# Patient Record
Sex: FEMALE | Race: BLACK OR AFRICAN AMERICAN | NOT HISPANIC OR LATINO | Employment: OTHER | URBAN - METROPOLITAN AREA
[De-identification: names, ages, dates, MRNs, and addresses within clinical notes are randomized per-mention and may not be internally consistent; named-entity substitution may affect disease eponyms.]

---

## 2017-01-17 ENCOUNTER — OFFICE VISIT (OUTPATIENT)
Dept: ORTHOPEDICS | Facility: CLINIC | Age: 72
End: 2017-01-17
Payer: MEDICARE

## 2017-01-17 VITALS
DIASTOLIC BLOOD PRESSURE: 66 MMHG | WEIGHT: 257.5 LBS | HEART RATE: 78 BPM | HEIGHT: 60 IN | BODY MASS INDEX: 50.55 KG/M2 | SYSTOLIC BLOOD PRESSURE: 102 MMHG

## 2017-01-17 DIAGNOSIS — M25.811 SHOULDER IMPINGEMENT, RIGHT: Primary | ICD-10-CM

## 2017-01-17 PROCEDURE — 99999 PR PBB SHADOW E&M-EST. PATIENT-LVL III: CPT | Mod: PBBFAC,,, | Performed by: ORTHOPAEDIC SURGERY

## 2017-01-17 PROCEDURE — 3074F SYST BP LT 130 MM HG: CPT | Mod: S$GLB,,, | Performed by: ORTHOPAEDIC SURGERY

## 2017-01-17 PROCEDURE — 1125F AMNT PAIN NOTED PAIN PRSNT: CPT | Mod: S$GLB,,, | Performed by: ORTHOPAEDIC SURGERY

## 2017-01-17 PROCEDURE — 3078F DIAST BP <80 MM HG: CPT | Mod: S$GLB,,, | Performed by: ORTHOPAEDIC SURGERY

## 2017-01-17 PROCEDURE — 20610 DRAIN/INJ JOINT/BURSA W/O US: CPT | Mod: RT,S$GLB,, | Performed by: ORTHOPAEDIC SURGERY

## 2017-01-17 PROCEDURE — 99214 OFFICE O/P EST MOD 30 MIN: CPT | Mod: 25,S$GLB,, | Performed by: ORTHOPAEDIC SURGERY

## 2017-01-17 PROCEDURE — 1157F ADVNC CARE PLAN IN RCRD: CPT | Mod: S$GLB,,, | Performed by: ORTHOPAEDIC SURGERY

## 2017-01-17 PROCEDURE — 1160F RVW MEDS BY RX/DR IN RCRD: CPT | Mod: S$GLB,,, | Performed by: ORTHOPAEDIC SURGERY

## 2017-01-17 PROCEDURE — 1159F MED LIST DOCD IN RCRD: CPT | Mod: S$GLB,,, | Performed by: ORTHOPAEDIC SURGERY

## 2017-01-17 RX ORDER — AZELASTINE HYDROCHLORIDE 0.5 MG/ML
SOLUTION/ DROPS OPHTHALMIC
COMMUNITY
Start: 2017-01-13 | End: 2018-07-26

## 2017-01-17 RX ORDER — METHYLPREDNISOLONE ACETATE 80 MG/ML
80 INJECTION, SUSPENSION INTRA-ARTICULAR; INTRALESIONAL; INTRAMUSCULAR; SOFT TISSUE
Status: COMPLETED | OUTPATIENT
Start: 2017-01-17 | End: 2017-01-17

## 2017-01-17 RX ADMIN — METHYLPREDNISOLONE ACETATE 80 MG: 80 INJECTION, SUSPENSION INTRA-ARTICULAR; INTRALESIONAL; INTRAMUSCULAR; SOFT TISSUE at 05:01

## 2017-01-17 NOTE — PROGRESS NOTES
SUBJECTIVE:  Patient is status post Right shoulder ATS  .  She complains of left shoulder pain.  She states that the pain in the left shoulder is rated a 10 out of 10 and interferes with her activities of daily living.  The patient has undergone a left shoulder arthroscope in the past.  Past Medical History   Diagnosis Date    Arthritis     Asthma     COPD (chronic obstructive pulmonary disease)     Diabetes mellitus     Encounter for blood transfusion     Hypertension     Lung disease     Sleep apnea      Past Surgical History   Procedure Laterality Date    Knee arthroplasty      Shoulder      Cyst removal      Appendectomy      Hysterectomy      Cholecystectomy      Cardiac surgery       valve replacement 12/2014    Hernia repair       left, 3/2015     Review of patient's allergies indicates:   Allergen Reactions    Ace inhibitors Anaphylaxis    Ultram [tramadol] Anaphylaxis     Current Outpatient Prescriptions on File Prior to Visit   Medication Sig Dispense Refill    albuterol (PROVENTIL HFA) 90 mcg/actuation inhaler Inhale 2 puffs into the lungs every 4 (four) hours as needed for Wheezing. 1 Inhaler 0    ALCOHOL PREP PADS PadM       amlodipine (NORVASC) 10 MG tablet Take 10 mg by mouth once daily.       ammonium lactate 12 % Crea Apply 1 Applicatorful topically.      aspirin (ECOTRIN) 81 MG EC tablet Take 81 mg by mouth once daily.      atorvastatin (LIPITOR) 40 MG tablet Take 40 mg by mouth once daily.   1    BENICAR 40 mg tablet Take 40 mg by mouth once daily.       CLEVER CHOICE BLOOD GLUC SYS Misc       doxazosin (CARDURA) 2 MG tablet Take 2 mg by mouth once daily.       EMBRACE BLOOD GLUCOSE SYSTEM Strp       furosemide (LASIX) 80 MG tablet Take 1 tablet by mouth 2 (two) times daily.      gabapentin (NEURONTIN) 300 MG capsule Take 300 mg by mouth 2 (two) times daily.       hydrocodone-acetaminophen 10-325mg (NORCO)  mg Tab Take 1 tablet by mouth every 6 (six) hours as  needed.      LANCING DEVICE WITH LANCETS Misc       lidocaine-prilocaine (EMLA) cream       loratadine (CLARITIN) 10 mg tablet Take 10 mg by mouth.      metformin (GLUCOPHAGE) 500 MG tablet Take 500 mg by mouth 2 (two) times daily with meals.      metolazone (ZAROXOLYN) 2.5 MG tablet Take 1 tablet by mouth once daily.      metoprolol tartrate (LOPRESSOR) 50 MG tablet Take 1 tablet by mouth 2 (two) times daily.      montelukast (SINGULAIR) 10 mg tablet TAKE 1 TABLET (10 MG TOTAL) BY MOUTH EVERY EVENING. 30 tablet 11    nabumetone (RELAFEN) 500 MG tablet TAKE 1 TABLET BY MOUTH EVERY DAY 30 tablet 2    potassium chloride SA (K-DUR,KLOR-CON) 20 MEQ tablet Take 1 tablet by mouth once daily.      ranitidine (ZANTAC) 150 MG tablet Take 150 mg by mouth.      terbinafine (LAMISIL) 250 mg tablet       IRON HIGH POTENCY 240 mg (27 mg iron) tablet Take 1 tablet by mouth once daily.  1    LITE TOUCH LANCETS 30 gauge Misc       omega-3 fatty acids (FISH OIL) 500 mg Cap Take 1 capsule by mouth once daily.      SYMBICORT 160-4.5 mcg/actuation HFAA INHALE 2 PUFFS INTO THE LUNGS EVERY 12 (TWELVE) HOURS. 10.2 Inhaler 11    VITAMIN D2 50,000 unit capsule Take 1 capsule by mouth once a week.  0    vitamin E 1000 UNIT capsule Take 1,000 Units by mouth once daily.      [DISCONTINUED] clopidogrel (PLAVIX) 75 mg tablet        No current facility-administered medications on file prior to visit.      Visit Vitals    /66    Pulse 78    Ht 5' (1.524 m)    Wt 116.8 kg (257 lb 8 oz)    BMI 50.29 kg/m2      ROS:  GENERAL: No fever, chills, fatigability or weight loss.  SKIN: No rashes, itching or changes in color or texture of skin.  HEAD: No headaches or recent head trauma.  EYES: Visual acuity fine. No photophobia, ocular pain or diplopia.  EARS: Denies ear pain, discharge or vertigo.  NOSE: No loss of smell, no epistaxis or postnasal drip.  MOUTH & THROAT: No hoarseness or change in voice. No excessive gum  bleeding.  NODES: Denies swollen glands.  CHEST: Denies JAIME, cyanosis, wheezing, cough and sputum production.  CARDIOVASCULAR: Denies chest pain, PND, orthopnea or reduced exercise tolerance.  ABDOMEN: Appetite fine. No weight loss. Denies diarrhea, abdominal pain, hematemesis or blood in stool.  URINARY: No flank pain, dysuria or hematuria.  PERIPHERAL VASCULAR: No claudication or cyanosis.  NEUROLOGIC: No history of seizures, paralysis, alteration of gait or coordination.  MUSCULOSKELETAL: See HPI    PE:  APPEARANCE: Well nourished, well developed, in no acute distress.   HEAD: Normocephalic, atraumatic.  EYES: PERRL. EOMI.   EARS: TM's intact. Light reflex normal. No retraction or perforation.   NOSE: Mucosa pink. Airway clear.  MOUTH & THROAT: No tonsillar enlargement. No pharyngeal erythema or exudate. No stridor.  NECK: Supple.   NODES: No cervical, axillary or inguinal lymph node enlargement.  CHEST: Lungs clear to auscultation.  CARDIOVASCULAR: Normal S1, S2. No rubs, murmurs or gallops.  ABDOMEN: Bowel sounds normal. Not distended. Soft. No tenderness or masses.  NEUROLOGIC: Cranial Nerves: II-XII grossly intact, also see MUSCULOSKELETAL  MUSCULOSKELETAL:      LeftShoulder- 2 plus radial  artery and ulnar artery pulses, light touch intact Right upper extremity.  All digits are warm. No erythema, no warmth, no drainage, No swelling, no significant tenderness.  Positive impingement.  Clinically the patient has more motion in the shoulder postoperatively.      ASSESSMENT:  The patient has no further questions during this visit.  The patient is stable and improving.    Diagnosis:      PLAN:  Follow up in 12 weeks   Continue pain medication  Continue occupational therapy      Injection:  The patient was placed in the supine position with   the right  shoulder facing me.  The shoulder was   prepped, sterily, with Alcohol and Betadine.  A  Refrigerant  And coolant was used to locally anesthetize the skin   over  the posterior aspect of the subacromial space.  A one and   a half inch, 27 gauge needle attached to   A 10 cc synringe was placed into the subacromial space.   A negative pressure was placed on the needle to   ensure the aspiration was placed into the space  And not into a blood vessel.  4 cc of a 1%  Lidocaine   was mixed with 1 cc of a 80 mg solution of Depo-Medrol injected.   The patient tolerated the shoulder injection well.  A Bandage   was placed over the injection site.

## 2017-01-17 NOTE — MR AVS SNAPSHOT
[facility]  [address] [place] [address]  Phone: [phone]  Fax: [phone]                  Saira العراقي   2017 2:45 PM   Office Visit    Description:  Female : 1945   Provider:  Richie Teixeira Sr., MD   Department:  [facility]           Reason for Visit     Right Shoulder - Pain                To Do List           Future Appointments        Provider Department Dept Phone    2017 9:40 AM Cassius Mazariegos MD '[facility] [phone]    2017 10:00 AM Richie Teixeira Sr., MD [facility] [phone]      Goals (5 Years of Data)     None      [facility]     [facility] Care [facility] -  Assistance  Registered nurses in the [facility] provide clinical advisement, health education, appointment booking, and other advisory services.  Call for this free service at [phone].             Medications           Message regarding Medications     Verify the changes and/or additions to your medication regime listed below are the same as discussed with your clinician today.  If any of these changes or additions are incorrect, please notify your healthcare provider.        STOP taking these medications     clopidogrel (PLAVIX) 75 mg tablet            Verify that the below list of medications is an accurate representation of the medications you are currently taking.  If none reported, the list may be blank. If incorrect, please contact your healthcare provider. Carry this list with you in case of emergency.           Current Medications     albuterol (PROVENTIL HFA) 90 mcg/actuation inhaler Inhale 2 puffs into the lungs every 4 (four) hours as needed for Wheezing.    ALCOHOL PREP PADS PadM     amlodipine (NORVASC) 10 MG tablet Take 10 mg by mouth once daily.     ammonium lactate 12 % Crea Apply 1 Applicatorful topically.    aspirin (ECOTRIN) 81 MG EC tablet Take 81 mg by mouth once daily.    atorvastatin (LIPITOR) 40  MG tablet Take 40 mg by mouth once daily.     azelastine (OPTIVAR) 0.05 % ophthalmic solution     BENICAR 40 mg tablet Take 40 mg by mouth once daily.     CLEVER CHOICE BLOOD GLUC SYS Misc     doxazosin (CARDURA) 2 MG tablet Take 2 mg by mouth once daily.     EMBRACE BLOOD GLUCOSE SYSTEM Strp     furosemide (LASIX) 80 MG tablet Take 1 tablet by mouth 2 (two) times daily.    gabapentin (NEURONTIN) 300 MG capsule Take 300 mg by mouth 2 (two) times daily.     hydrocodone-acetaminophen 10-325mg (NORCO)  mg Tab Take 1 tablet by mouth every 6 (six) hours as needed.    LANCING DEVICE WITH LANCETS Misc     lidocaine-prilocaine (EMLA) cream     loratadine (CLARITIN) 10 mg tablet Take 10 mg by mouth.    metformin (GLUCOPHAGE) 500 MG tablet Take 500 mg by mouth 2 (two) times daily with meals.    metolazone (ZAROXOLYN) 2.5 MG tablet Take 1 tablet by mouth once daily.    metoprolol tartrate (LOPRESSOR) 50 MG tablet Take 1 tablet by mouth 2 (two) times daily.    montelukast (SINGULAIR) 10 mg tablet TAKE 1 TABLET (10 MG TOTAL) BY MOUTH EVERY EVENING.    nabumetone (RELAFEN) 500 MG tablet TAKE 1 TABLET BY MOUTH EVERY DAY    potassium chloride SA (K-DUR,KLOR-CON) 20 MEQ tablet Take 1 tablet by mouth once daily.    ranitidine (ZANTAC) 150 MG tablet Take 150 mg by mouth.    terbinafine (LAMISIL) 250 mg tablet     IRON HIGH POTENCY 240 mg (27 mg iron) tablet Take 1 tablet by mouth once daily.    LITE TOUCH LANCETS 30 gauge Misc     omega-3 fatty acids (FISH OIL) 500 mg Cap Take 1 capsule by mouth once daily.    SYMBICORT 160-4.5 mcg/actuation HFAA INHALE 2 PUFFS INTO THE LUNGS EVERY 12 (TWELVE) HOURS.    VITAMIN D2 50,000 unit capsule Take 1 capsule by mouth once a week.    vitamin E 1000 UNIT capsule Take 1,000 Units by mouth once daily.           Clinical Reference Information           Vital Signs - Last Recorded  Most recent update: 1/17/2017  2:51 PM by Roxy Craig MA    BP Pulse Ht Wt BMI    102/66 78 5' (1.524 m)  116.8 kg (257 lb 8 oz) 50.29 kg/m2      Blood Pressure          Most Recent Value    BP  102/66      Allergies as of 1/17/2017     Ace Inhibitors    Ultram [Tramadol]      Immunizations Administered on Date of Encounter - 1/17/2017     None      [facility] Sign-Up     Activating your [facility] account is as easy as 1-2-3!     1) Visit [email or web address], select Sign Up Now, enter this activation code and your date of birth, then select Next.  [email or web address]  Expires: 3/3/2017  3:23 PM      2) Create a username and password to use when you visit [facility] in the future and select a security question in case you lose your password and select Next.    3) Enter your e-mail address and click Sign Up!    Additional Information  If you have questions, please e-mail [email or web address] or call [phone] to talk to our [facility] staff. Remember, [facility] is NOT to be used for urgent needs. For medical emergencies, dial 911.

## 2017-01-17 NOTE — PATIENT INSTRUCTIONS
Shoulder Arthroscopy  The shoulder is your bodys most flexible joint. It lets the arm move in almost any direction. But this flexibility has a price -- it makes the joint prone to injury. If you have a shoulder problem, a surgical procedure called arthroscopy can help.     A camera in the arthroscope allows your surgeon to view your shoulder joint on a monitor.   Your orthopaedic evaluation  Your doctor will ask about your symptoms and the history of your shoulder problem. He or she will examine your shoulder and may give you tests, such as an X-ray or MRI. These help your doctor find the cause of your shoulder problem.  Arthroscopy: Looking inside your joint  Arthroscopy is a procedure that allows your doctor to see and work inside your shoulder joint. Your doctor makes small incision in your shoulder and inserts a long, thin, lighted instrument, called an arthroscope. During surgery, the arthroscope sends live video images from inside your joint to a screen that your doctor views. Using these images, your doctor can diagnose and treat your shoulder problem. Because arthroscopy uses much smaller incisions than open surgery, recovery is often shorter and less painful.  Risks and possible complications of shoulder arthroscopy  · Stiffness or ongoing pain in your shoulder  · Bleeding or blood clots  · Infection  · Damage to nerves or blood vessels  You may still need open surgery after having arthroscopy.  © 4949-2245 [employer]. [address] [place] [address], [place] [address]. All rights reserved. This information is not intended as a substitute for professional medical care. Always follow your healthcare professional's instructions.

## 2017-02-01 DIAGNOSIS — J45.909 UNCOMPLICATED ASTHMA, UNSPECIFIED ASTHMA SEVERITY: Primary | ICD-10-CM

## 2017-02-03 ENCOUNTER — HOSPITAL ENCOUNTER (OUTPATIENT)
Dept: RADIOLOGY | Facility: HOSPITAL | Age: 72
Discharge: HOME | End: 2017-02-03
Attending: INTERNAL MEDICINE
Payer: MEDICARE

## 2017-02-03 ENCOUNTER — PROCEDURE VISIT (OUTPATIENT)
Dept: PULMONOLOGY | Facility: CLINIC | Age: 72
End: 2017-02-03
Payer: MEDICARE

## 2017-02-03 ENCOUNTER — OFFICE VISIT (OUTPATIENT)
Dept: PULMONOLOGY | Facility: CLINIC | Age: 72
End: 2017-02-03
Payer: MEDICARE

## 2017-02-03 VITALS
WEIGHT: 257 LBS | HEIGHT: 60 IN | OXYGEN SATURATION: 95 % | DIASTOLIC BLOOD PRESSURE: 84 MMHG | BODY MASS INDEX: 50.45 KG/M2 | SYSTOLIC BLOOD PRESSURE: 140 MMHG | HEART RATE: 79 BPM | RESPIRATION RATE: 18 BRPM

## 2017-02-03 DIAGNOSIS — G47.33 OBSTRUCTIVE SLEEP APNEA SYNDROME: Chronic | ICD-10-CM

## 2017-02-03 DIAGNOSIS — J45.30 MILD PERSISTENT ASTHMA WITHOUT COMPLICATION: Primary | Chronic | ICD-10-CM

## 2017-02-03 DIAGNOSIS — J44.9 CHRONIC OBSTRUCTIVE PULMONARY DISEASE, UNSPECIFIED COPD TYPE: ICD-10-CM

## 2017-02-03 DIAGNOSIS — J45.909 UNCOMPLICATED ASTHMA, UNSPECIFIED ASTHMA SEVERITY: ICD-10-CM

## 2017-02-03 DIAGNOSIS — E66.01 MORBID OBESITY WITH BMI OF 40.0-44.9, ADULT: Chronic | ICD-10-CM

## 2017-02-03 DIAGNOSIS — Z74.09 MOBILITY IMPAIRED: Chronic | ICD-10-CM

## 2017-02-03 LAB
PRE FEF 25 75: 1.27 L/S (ref 0.77–1.97)
PRE FET 100: 8.9 S
PRE FEV1 FVC: 78 %
PRE FEV1: 1.3 L (ref 1.18–1.71)
PRE FVC: 1.67 L (ref 1.57–2.2)
PRE PEF: 3.11 L/S (ref 3.23–4.98)
PREDICTED FEV1 FVC: 75.72 % (ref 70.82–80.62)
PREDICTED FEV1: 1.45 L (ref 1.18–1.71)
PREDICTED FVC: 1.88 L (ref 1.57–2.2)
PROVOCATION PROTOCOL: ABNORMAL

## 2017-02-03 PROCEDURE — 3077F SYST BP >= 140 MM HG: CPT | Mod: S$GLB,,, | Performed by: INTERNAL MEDICINE

## 2017-02-03 PROCEDURE — 1157F ADVNC CARE PLAN IN RCRD: CPT | Mod: S$GLB,,, | Performed by: INTERNAL MEDICINE

## 2017-02-03 PROCEDURE — 99215 OFFICE O/P EST HI 40 MIN: CPT | Mod: 25,S$GLB,, | Performed by: INTERNAL MEDICINE

## 2017-02-03 PROCEDURE — 94010 BREATHING CAPACITY TEST: CPT | Mod: S$GLB,,, | Performed by: INTERNAL MEDICINE

## 2017-02-03 PROCEDURE — 3079F DIAST BP 80-89 MM HG: CPT | Mod: S$GLB,,, | Performed by: INTERNAL MEDICINE

## 2017-02-03 PROCEDURE — 99999 PR PBB SHADOW E&M-EST. PATIENT-LVL III: CPT | Mod: PBBFAC,,, | Performed by: INTERNAL MEDICINE

## 2017-02-03 PROCEDURE — 1160F RVW MEDS BY RX/DR IN RCRD: CPT | Mod: S$GLB,,, | Performed by: INTERNAL MEDICINE

## 2017-02-03 PROCEDURE — 71020 XR CHEST PA AND LATERAL: CPT | Mod: 26,,, | Performed by: RADIOLOGY

## 2017-02-03 PROCEDURE — 1159F MED LIST DOCD IN RCRD: CPT | Mod: S$GLB,,, | Performed by: INTERNAL MEDICINE

## 2017-02-03 RX ORDER — BUDESONIDE AND FORMOTEROL FUMARATE DIHYDRATE 160; 4.5 UG/1; UG/1
AEROSOL RESPIRATORY (INHALATION)
Qty: 10.2 INHALER | Refills: 11 | Status: SHIPPED | OUTPATIENT
Start: 2017-02-03 | End: 2018-08-08 | Stop reason: SDUPTHER

## 2017-02-03 RX ORDER — ALBUTEROL SULFATE 90 UG/1
2 AEROSOL, METERED RESPIRATORY (INHALATION) EVERY 4 HOURS PRN
Qty: 18 G | Refills: 11 | Status: SHIPPED | OUTPATIENT
Start: 2017-02-03 | End: 2018-02-02 | Stop reason: SDUPTHER

## 2017-02-03 NOTE — ASSESSMENT & PLAN NOTE
Asthma ROS:SYMBICIRT, SINGULAIR, PROVENTIL, PROAIR. Taking medications as instructed, no medication side effects noted, no significant ongoing wheezing or shortness of breath, using bronchodilator MDI less than twice a week.   New concerns: None.   Exam: appears well, vitals normal, no respiratory distress, acyanotic, normal RR, chest clear, no wheezing, crepitations, rhonchi, normal symmetric air entry.   Assessment:  Asthma Somewhat controlled.   Plan: current treatment plan is effective, no change in therapy, reviewed use of rescue vs controlling agents, oral and inhaled meds and potential side effects. SYMBICORT and PROAIR REFILLED. Spirometry and CXR in 1 year.

## 2017-02-03 NOTE — ASSESSMENT & PLAN NOTE
Complications associated with obesity reviewed. These include but are not limited to HTN, CAD,CHF, Respiratory disease, Insulin resistance syndrome, hyperlipidemia, atrial fibrillation, cancer  (endometrial, breast, kidney, colorectal, esophageal, renal, pancreatic and gall bladder cancer),GERD and NAFLD. Weight loss approaches reviewd with patient. Combination modality that includes eating behaviour changes, moderate excercise, adjuncvtive pharmacological therapy reviewed.  Patient expressed and verbalized understanding.

## 2017-02-03 NOTE — PATIENT INSTRUCTIONS
Lung Anatomy  Your lungs take air in to give your body oxygen, which the body needs to work. Your lungs, like all the tissues in your body, are made up of billions of tiny specialized cells. Old lung cells die and are replaced by new, identical lung cells. This natural process helps ensure healthy lungs.    Date Last Reviewed: 11/1/2016  © 0510-0264 [employer]. [address] [place] [address]. All rights reserved. This information is not intended as a substitute for professional medical care. Always follow your healthcare professional's instructions.

## 2017-02-03 NOTE — PROGRESS NOTES
Subjective:      Patient ID: Saira العراقي is a 71 y.o. female.    Patient Active Problem List   Diagnosis    Right knee pain    Excessive sleepiness    Asthma    Sleep apnea    Morbid obesity with BMI of 40.0-44.9, adult    Arthritis of right shoulder region    Right shoulder tendonitis    Carpal tunnel syndrome    Right carpal tunnel syndrome    Unspecified disorder of synovium, tendon, and bursa    Acromioclavicular (joint) (ligament) sprain    Impingement syndrome of right shoulder    Rotator cuff tendinitis    Postoperative state    Morbid obesity with BMI of 50.0-59.9, adult    Arthritis of left shoulder region    Impingement syndrome of left shoulder    Mobility impaired     Problem list has been reviewed.    Chief Complaint: Asthma    HPI    Her asthma is some what controlled. Her current respiratory therapy regimen is SYMBICORT, PROAIR and PROVENTIL.   which provides  relief. She is  adherent with her regimen.  She states that she  is fine and has no specific pulmonary complaints. She denies cough sputum, hemoptysis,  pain with breathing, wheezing, asthma.   A full  review of systems, past , family  and social histories was performed except as mentioned in the note above, these are non contributory to the main issues discussed Today. Spirometry reviewd with pateint who voiced understanding.     Answers for HPI/ROS submitted by the patient on 2/3/2017   In the past 4 weeks, how much of the time did your asthma keep you from getting as much done at work, school, or at home?: a little of the time  During the past 4 weeks, how often have you had shortness of breath?: once a day  During the past 4 weeks, how often did your asthma symptoms (Wheezing, coughing, shortness of breath, chest tightness or pain) wake you up at night or earlier that usual in the morning?: once a week  During the past 4 weeks, how often have you used your rescue inhaler or nebulizer medication (such as albuterol)?: 3  or more times per day  How would you rate your asthma control during the past 4 weeks?: somewhat controlled   : 13    Does she do nebulizer treatments? yes  Does she use an inhaler? yes  Does she use a spacer with MDIs? no  Does she monitor peak flow rates? no   What is her personal best peak flow rate: n/a      Current Disease Severity  frequent daytime asthma symptoms.   weekly nighttime asthma symptoms.   daily.   Number of urgent/emergent visit in last year: 2  Current limitations in activity from asthma: none.   Number of days of school or work missed in the last month: not applicable.     Asthma Classification (General Symptom Frequency):  Mild Intermittent (< 2 x wk)  Mild Persistent (> 2 x wk, < daily)  Moderate Persistent (daily; almost daily inhaler)  Severe Persistent (continual; limited activities)    She is on CPAP of 13 cms. She did not bring her data card for compliance download.  Patient denies snoring, headaches, excessive daytime sleepiness. Her ESS today is 7. She definitely thinks PAP is beneficial to her health and she wants to continue with PAP therapy.       Immunization status reviewed and updated.    Previous Report Reviewed: office notes     The following portions of the patient's history were reviewed and updated as appropriate: She  has a past medical history of Arthritis; Asthma; COPD (chronic obstructive pulmonary disease); Diabetes mellitus; Encounter for blood transfusion; Hypertension; Lung disease; and Sleep apnea.  She  has a past surgical history that includes Knee Arthroplasty; shoulder; Cyst Removal; Appendectomy; Hysterectomy; Cholecystectomy; Cardiac surgery; and Hernia repair.  Her family history includes Cancer in her father and mother; Diabetes in her mother; Heart failure in her maternal grandfather and son; Hypertension in her father and mother.  She  reports that she quit smoking about 3 years ago. She has a 50.00 pack-year smoking history. She has never used smokeless  tobacco. She reports that she does not drink alcohol or use illicit drugs.  She has a current medication list which includes the following prescription(s): alcohol prep pads, amlodipine, ammonium lactate, aspirin, atorvastatin, azelastine, benicar, budesonide-formoterol 160-4.5 mcg, clever choice blood gluc sys, doxazosin, embrace blood glucose system, furosemide, gabapentin, hydrocodone-acetaminophen 10-325mg, iron high potency, lancing device with lancets, lidocaine-prilocaine, lite touch lancets, loratadine, metformin, metolazone, metoprolol tartrate, montelukast, nabumetone, omega-3 fatty acids, potassium chloride sa, ranitidine, terbinafine hcl, vitamin d2, vitamin e, and albuterol.  She is allergic to ace inhibitors and ultram [tramadol]..    Review of Systems   Constitutional: Negative for fever, chills and fatigue.   HENT: Positive for rhinorrhea, sinus pressure, sneezing and congestion. Negative for postnasal drip.    Eyes: Positive for itching.   Respiratory: Positive for dyspnea on extertion. Negative for cough, hemoptysis, sputum production, shortness of breath and wheezing.    Cardiovascular: Positive for leg swelling. Negative for chest pain.   Genitourinary: Negative for difficulty urinating and hematuria.   Musculoskeletal: Positive for arthralgias and back pain.   Skin: Negative for rash.   Gastrointestinal: Negative for nausea, vomiting and abdominal pain.   Neurological: Negative for dizziness, syncope, light-headedness and headaches.   Hematological: Bleeds easily.   Psychiatric/Behavioral: The patient is not nervous/anxious.       Objective:     Visit Vitals    BP (!) 140/84    Pulse 79    Resp 18    Ht 5' (1.524 m)    Wt 116.6 kg (257 lb)    SpO2 95%    BMI 50.19 kg/m2     Body mass index is 50.19 kg/(m^2).    Physical Exam   Constitutional: She is oriented to person, place, and time. She appears well-developed and well-nourished.   Obese   HENT:   Head: Normocephalic and atraumatic.    Neck: Normal range of motion. Neck supple.   Cardiovascular: Normal rate and regular rhythm.  Exam reveals no gallop.    No murmur heard.  Pulmonary/Chest: Effort normal and breath sounds normal.   Abdominal: Soft. Bowel sounds are normal.   Musculoskeletal: Normal range of motion. She exhibits no edema.   Neurological: She is alert and oriented to person, place, and time.   Skin: Skin is warm and dry.   Psychiatric: She has a normal mood and affect.       Personal Diagnostic Review    Pulmonary function tests: FEV1: 1.30 (90 % predicted), FVC: 1.67 (89% predicted), FEV1/FVC:  78%:  Normal spirometry.      CXR: NAPD      Assessment:       1. Mild persistent asthma without complication Stable   2. Obstructive sleep apnea syndrome Stable   3. Morbid obesity with BMI of 40.0-44.9, adult Stable   4. Mobility impaired Stable   5. Chronic obstructive pulmonary disease, unspecified COPD type      Outpatient Encounter Prescriptions as of 2/3/2017   Medication Sig Dispense Refill    ALCOHOL PREP PADS PadM       amlodipine (NORVASC) 10 MG tablet Take 10 mg by mouth once daily.       ammonium lactate 12 % Crea Apply 1 Applicatorful topically.      aspirin (ECOTRIN) 81 MG EC tablet Take 81 mg by mouth once daily.      atorvastatin (LIPITOR) 40 MG tablet Take 40 mg by mouth once daily.   1    azelastine (OPTIVAR) 0.05 % ophthalmic solution       BENICAR 40 mg tablet Take 40 mg by mouth once daily.       budesonide-formoterol 160-4.5 mcg (SYMBICORT) 160-4.5 mcg/actuation HFAA INHALE 2 PUFFS INTO THE LUNGS EVERY 12 (TWELVE) HOURS. 10.2 Inhaler 11    CLEVER CHOICE BLOOD GLUC SYS Misc       doxazosin (CARDURA) 2 MG tablet Take 2 mg by mouth once daily.       EMBRACE BLOOD GLUCOSE SYSTEM Strp       furosemide (LASIX) 80 MG tablet Take 1 tablet by mouth 2 (two) times daily.      gabapentin (NEURONTIN) 300 MG capsule Take 300 mg by mouth 2 (two) times daily.       hydrocodone-acetaminophen 10-325mg (NORCO)  mg Tab  Take 1 tablet by mouth every 6 (six) hours as needed.      IRON HIGH POTENCY 240 mg (27 mg iron) tablet Take 1 tablet by mouth once daily.  1    LANCING DEVICE WITH LANCETS Misc       lidocaine-prilocaine (EMLA) cream       LITE TOUCH LANCETS 30 gauge Misc       loratadine (CLARITIN) 10 mg tablet Take 10 mg by mouth.      metformin (GLUCOPHAGE) 500 MG tablet Take 500 mg by mouth 2 (two) times daily with meals.      metolazone (ZAROXOLYN) 2.5 MG tablet Take 1 tablet by mouth once daily.      metoprolol tartrate (LOPRESSOR) 50 MG tablet Take 1 tablet by mouth 2 (two) times daily.      montelukast (SINGULAIR) 10 mg tablet TAKE 1 TABLET (10 MG TOTAL) BY MOUTH EVERY EVENING. 30 tablet 11    nabumetone (RELAFEN) 500 MG tablet TAKE 1 TABLET BY MOUTH EVERY DAY 30 tablet 2    omega-3 fatty acids (FISH OIL) 500 mg Cap Take 1 capsule by mouth once daily.      potassium chloride SA (K-DUR,KLOR-CON) 20 MEQ tablet Take 1 tablet by mouth once daily.      ranitidine (ZANTAC) 150 MG tablet Take 150 mg by mouth.      terbinafine (LAMISIL) 250 mg tablet       VITAMIN D2 50,000 unit capsule Take 1 capsule by mouth once a week.  0    vitamin E 1000 UNIT capsule Take 1,000 Units by mouth once daily.      [DISCONTINUED] albuterol (PROVENTIL HFA) 90 mcg/actuation inhaler Inhale 2 puffs into the lungs every 4 (four) hours as needed for Wheezing. 1 Inhaler 0    [DISCONTINUED] SYMBICORT 160-4.5 mcg/actuation HFAA INHALE 2 PUFFS INTO THE LUNGS EVERY 12 (TWELVE) HOURS. 10.2 Inhaler 11    albuterol 90 mcg/actuation inhaler Inhale 2 puffs into the lungs every 4 (four) hours as needed for Wheezing. 18 g 11     No facility-administered encounter medications on file as of 2/3/2017.      Orders Placed This Encounter   Procedures    X-Ray Chest PA And Lateral     Standing Status:   Future     Standing Expiration Date:   3/16/2018    Ambulatory Referral to Physical/Occupational Therapy     Referral Priority:   Routine     Referral  Type:   Physical Medicine     Referral Reason:   Specialty Services Required     Number of Visits Requested:   1    Spirometry with/without bronchodilator     Standing Status:   Future     Standing Expiration Date:   2/3/2018     Plan:     Discussed diagnosis, its evaluation, treatment and usual course. All questions answered.    Morbid obesity with BMI of 40.0-44.9, adult  Complications associated with obesity reviewed. These include but are not limited to HTN, CAD,CHF, Respiratory disease, Insulin resistance syndrome, hyperlipidemia, atrial fibrillation, cancer  (endometrial, breast, kidney, colorectal, esophageal, renal, pancreatic and gall bladder cancer),GERD and NAFLD. Weight loss approaches reviewd with patient. Combination modality that includes eating behaviour changes, moderate excercise, adjuncvtive pharmacological therapy reviewed.  Patient expressed and verbalized understanding.    Asthma  Asthma ROS:SYMBICIRT, SINGULAIR, PROVENTIL, PROAIR. Taking medications as instructed, no medication side effects noted, no significant ongoing wheezing or shortness of breath, using bronchodilator MDI less than twice a week.   New concerns: None.   Exam: appears well, vitals normal, no respiratory distress, acyanotic, normal RR, chest clear, no wheezing, crepitations, rhonchi, normal symmetric air entry.   Assessment:  Asthma Somewhat controlled.   Plan: current treatment plan is effective, no change in therapy, reviewed use of rescue vs controlling agents, oral and inhaled meds and potential side effects. SYMBICORT and PROAIR REFILLED. Spirometry and CXR in 1 year.     Sleep apnea  Continue CPAP of 13. (ALONZO  Susan E)   Discussed therapeutic goals for positive airway pressure therapy(CPAP or BiPAP): Ideal is usage 100% of nights for 6 - 8 hours per night. Minimum usage is 70% of night for at least 4 hours per night used   Pateint expressed understanding. All Questions answered.    Mobility impaired  Referred to  physical medicine for mobility assessment.     TIME SPENT WITH PATIENT: Time spent: 40 minutes in face to face  discussion concerning diagnosis, prognosis, review of lab and test results, benefits of treatment as well as management of disease, counseling of patient and coordination of care between various health  care providers . Greater than half the time spent was used for coordination of care and counseling of patient.     Return in about 1 year (around 2/3/2018) for Morbid Obesity, JACQUELINE, Asthma.

## 2017-02-03 NOTE — ASSESSMENT & PLAN NOTE
Continue CPAP of 13. (ALONZO Davis TASHA)   Discussed therapeutic goals for positive airway pressure therapy(CPAP or BiPAP): Ideal is usage 100% of nights for 6 - 8 hours per night. Minimum usage is 70% of night for at least 4 hours per night used   Pateint expressed understanding. All Questions answered.

## 2017-02-14 ENCOUNTER — TELEPHONE (OUTPATIENT)
Dept: PULMONOLOGY | Facility: CLINIC | Age: 72
End: 2017-02-14

## 2017-02-14 DIAGNOSIS — Z74.09 MOBILITY IMPAIRED: Primary | ICD-10-CM

## 2017-02-14 NOTE — TELEPHONE ENCOUNTER
----- Message from Gladys Castillo sent at 2/9/2017 10:36 AM CST -----  Contact: patient  States her insurance does not cover PT with [facility]. Requesting a referral to [facility], PT @ [phone]. Please call patient @ [phone]. Thanks, lazaro

## 2017-02-23 ENCOUNTER — TELEPHONE (OUTPATIENT)
Dept: PULMONOLOGY | Facility: CLINIC | Age: 72
End: 2017-02-23

## 2017-02-23 NOTE — TELEPHONE ENCOUNTER
----- Message from Carolann Fairbanks sent at 2/23/2017 11:33 AM CST -----  Contact[facility]  Mrs. Oneill called and stated the patient is requesting PT and they need orders from the doctor to support her request.    She can be contacted at [phone] ext [phone].    Thanks,  Carolann

## 2017-03-08 DIAGNOSIS — J45.30 MILD PERSISTENT ASTHMA WITHOUT COMPLICATION: ICD-10-CM

## 2017-03-08 RX ORDER — ALBUTEROL SULFATE 0.63 MG/3ML
SOLUTION RESPIRATORY (INHALATION)
Qty: 375 ML | Refills: 5 | Status: SHIPPED | OUTPATIENT
Start: 2017-03-08 | End: 2018-03-09 | Stop reason: SDUPTHER

## 2017-03-10 ENCOUNTER — TELEPHONE (OUTPATIENT)
Dept: PULMONOLOGY | Facility: CLINIC | Age: 72
End: 2017-03-10

## 2017-03-10 NOTE — TELEPHONE ENCOUNTER
Arturo states that they are unable to help patient because they do not have the equipment to do mobility assessment

## 2017-03-10 NOTE — TELEPHONE ENCOUNTER
----- Message from Caitlyn Gordon sent at 3/10/2017  9:12 AM CST -----  Call john at [employer] at [phone]//want to give you information about the pt//thks ht

## 2017-03-21 ENCOUNTER — TELEPHONE (OUTPATIENT)
Dept: PULMONOLOGY | Facility: CLINIC | Age: 72
End: 2017-03-21

## 2017-03-21 DIAGNOSIS — M25.512 ACUTE PAIN OF LEFT SHOULDER: Primary | ICD-10-CM

## 2017-03-21 NOTE — TELEPHONE ENCOUNTER
----- Message from Ann Camacho sent at 3/21/2017 10:18 AM CDT -----  Contact: Pt   Pt called and stated she was returning a call to the nurse. She can be reached at [phone].    Thanks,  TF

## 2017-03-23 ENCOUNTER — TELEPHONE (OUTPATIENT)
Dept: PULMONOLOGY | Facility: CLINIC | Age: 72
End: 2017-03-23

## 2017-03-23 ENCOUNTER — TELEPHONE (OUTPATIENT)
Dept: ORTHOPEDICS | Facility: CLINIC | Age: 72
End: 2017-03-23

## 2017-03-23 NOTE — TELEPHONE ENCOUNTER
----- Message from Reshma Garcia sent at 3/23/2017  9:11 AM CDT -----  Contact: Ángel/[facility]  Ángel is requesting to speak the nurse regarding a wheel chair eval for pt. Pls call Ángel back at [phone].

## 2017-03-23 NOTE — TELEPHONE ENCOUNTER
Spoke to Ángel she states that a location change request has to be sent to [facility] for mobility evaluation, medical necessity form filled out and faxed to [facility]

## 2017-03-23 NOTE — TELEPHONE ENCOUNTER
----- Message from Caitlyn Gordon sent at 3/23/2017  8:17 AM CDT -----  Contact: Tawana Gilliam at [facility] physical therapy at [phone]//in reference  to an order we received it not what the pt  Stated. we need Clarification////thks ht

## 2017-03-23 NOTE — TELEPHONE ENCOUNTER
I spoke with Tawana she stated that pt informed her she is coming in for a wheelchair evaluation. I informed Tawana after researching the chart that Dr Rosales put in an order back on 02/03/17 to have a wheel chair eval. But im not sure where it went. Ms Gilliam verbalized she understood.

## 2017-03-27 ENCOUNTER — TELEPHONE (OUTPATIENT)
Dept: PULMONOLOGY | Facility: CLINIC | Age: 72
End: 2017-03-27

## 2017-03-27 NOTE — TELEPHONE ENCOUNTER
----- Message from Ann Camacho sent at 3/27/2017  9:49 AM CDT -----  Contact: Tawana with [facility]  Tawana called and stated she needed to speak to the nurse. She stated she is checking the status of an order for a wheelchair for the pt. She can be reached at [phone] and fax [phone].    Thanks,  TF

## 2017-03-27 NOTE — TELEPHONE ENCOUNTER
bijal notified that order and medical necessity form was sent to [facility] on 3/23/17

## 2017-05-05 ENCOUNTER — HOSPITAL ENCOUNTER (OUTPATIENT)
Dept: RADIOLOGY | Facility: HOSPITAL | Age: 72
Discharge: HOME | End: 2017-05-05
Attending: ORTHOPAEDIC SURGERY
Payer: MEDICARE

## 2017-05-05 ENCOUNTER — OFFICE VISIT (OUTPATIENT)
Dept: ORTHOPEDICS | Facility: CLINIC | Age: 72
End: 2017-05-05
Payer: MEDICARE

## 2017-05-05 ENCOUNTER — TELEPHONE (OUTPATIENT)
Dept: PULMONOLOGY | Facility: CLINIC | Age: 72
End: 2017-05-05

## 2017-05-05 VITALS
RESPIRATION RATE: 12 BRPM | HEIGHT: 60 IN | SYSTOLIC BLOOD PRESSURE: 118 MMHG | BODY MASS INDEX: 50.21 KG/M2 | HEART RATE: 75 BPM | DIASTOLIC BLOOD PRESSURE: 77 MMHG | WEIGHT: 255.75 LBS

## 2017-05-05 DIAGNOSIS — M25.511 CHRONIC RIGHT SHOULDER PAIN: Primary | ICD-10-CM

## 2017-05-05 DIAGNOSIS — G89.29 CHRONIC RIGHT SHOULDER PAIN: Primary | ICD-10-CM

## 2017-05-05 DIAGNOSIS — M25.511 CHRONIC RIGHT SHOULDER PAIN: ICD-10-CM

## 2017-05-05 DIAGNOSIS — G89.29 CHRONIC RIGHT SHOULDER PAIN: ICD-10-CM

## 2017-05-05 PROCEDURE — 73030 X-RAY EXAM OF SHOULDER: CPT | Mod: 26,RT,, | Performed by: RADIOLOGY

## 2017-05-05 PROCEDURE — 3074F SYST BP LT 130 MM HG: CPT | Mod: S$GLB,,, | Performed by: PHYSICIAN ASSISTANT

## 2017-05-05 PROCEDURE — 99213 OFFICE O/P EST LOW 20 MIN: CPT | Mod: 25,S$GLB,, | Performed by: PHYSICIAN ASSISTANT

## 2017-05-05 PROCEDURE — 3078F DIAST BP <80 MM HG: CPT | Mod: S$GLB,,, | Performed by: PHYSICIAN ASSISTANT

## 2017-05-05 PROCEDURE — 1160F RVW MEDS BY RX/DR IN RCRD: CPT | Mod: S$GLB,,, | Performed by: PHYSICIAN ASSISTANT

## 2017-05-05 PROCEDURE — 73030 X-RAY EXAM OF SHOULDER: CPT | Mod: TC,PO,RT

## 2017-05-05 PROCEDURE — 1159F MED LIST DOCD IN RCRD: CPT | Mod: S$GLB,,, | Performed by: PHYSICIAN ASSISTANT

## 2017-05-05 PROCEDURE — 99999 PR PBB SHADOW E&M-EST. PATIENT-LVL V: CPT | Mod: 25,PBBFAC,, | Performed by: PHYSICIAN ASSISTANT

## 2017-05-05 PROCEDURE — 1125F AMNT PAIN NOTED PAIN PRSNT: CPT | Mod: S$GLB,,, | Performed by: PHYSICIAN ASSISTANT

## 2017-05-05 PROCEDURE — 20610 DRAIN/INJ JOINT/BURSA W/O US: CPT | Mod: RT,S$GLB,, | Performed by: PHYSICIAN ASSISTANT

## 2017-05-05 RX ORDER — PREDNISOLONE ACETATE 10 MG/ML
SUSPENSION/ DROPS OPHTHALMIC
COMMUNITY
Start: 2017-03-24 | End: 2018-07-26

## 2017-05-05 RX ORDER — OFLOXACIN 3 MG/ML
SOLUTION/ DROPS OPHTHALMIC
COMMUNITY
Start: 2017-05-02 | End: 2018-07-26

## 2017-05-05 RX ORDER — NEPAFENAC 3 MG/ML
SUSPENSION/ DROPS OPHTHALMIC
COMMUNITY
Start: 2017-04-17 | End: 2018-07-26

## 2017-05-05 RX ORDER — METHYLPREDNISOLONE ACETATE 40 MG/ML
40 INJECTION, SUSPENSION INTRA-ARTICULAR; INTRALESIONAL; INTRAMUSCULAR; SOFT TISSUE
Status: COMPLETED | OUTPATIENT
Start: 2017-05-05 | End: 2017-05-05

## 2017-05-05 RX ADMIN — METHYLPREDNISOLONE ACETATE 40 MG: 40 INJECTION, SUSPENSION INTRA-ARTICULAR; INTRALESIONAL; INTRAMUSCULAR; SOFT TISSUE at 11:05

## 2017-05-05 NOTE — PROGRESS NOTES
SUBJECTIVE:  Patient is status post Right shoulder ATS  .  She complains of left shoulder pain.  She states that the pain in the left shoulder is rated a 10 out of 10 and interferes with her activities of daily living.  The patient has undergone a left shoulder arthroscope in the past.    Date of surgery: 9/23/2015    HPI: under went the above surgery, was better for a while. Over past several months ROM is decreasing, pain increasing. Received steroid injection in Jan 2017 with relief for few weeks only.       Past Medical History:   Diagnosis Date    Arthritis     Asthma     COPD (chronic obstructive pulmonary disease)     Diabetes mellitus     Encounter for blood transfusion     Hypertension     Lung disease     Sleep apnea      Past Surgical History:   Procedure Laterality Date    APPENDECTOMY      CARDIAC SURGERY      valve replacement 12/2014    CATARACT EXTRACTION W/ INTRAOCULAR LENS  IMPLANT, BILATERAL      CHOLECYSTECTOMY      CYST REMOVAL      HERNIA REPAIR      left, 3/2015    HYSTERECTOMY      KNEE ARTHROPLASTY      shoulder       Review of patient's allergies indicates:   Allergen Reactions    Ace inhibitors Anaphylaxis    Ultram [tramadol] Anaphylaxis     Current Outpatient Prescriptions on File Prior to Visit   Medication Sig Dispense Refill    albuterol (ACCUNEB) 0.63 mg/3 mL Nebu INHALE 1 VIAL VIA NEBULIZER EVERY 6 HOURS AS NEEDED 375 mL 5    albuterol 90 mcg/actuation inhaler Inhale 2 puffs into the lungs every 4 (four) hours as needed for Wheezing. 18 g 11    ALCOHOL PREP PADS PadM       amlodipine (NORVASC) 10 MG tablet Take 10 mg by mouth once daily.       ammonium lactate 12 % Crea Apply 1 Applicatorful topically.      aspirin (ECOTRIN) 81 MG EC tablet Take 81 mg by mouth once daily.      atorvastatin (LIPITOR) 40 MG tablet Take 40 mg by mouth once daily.   1    azelastine (OPTIVAR) 0.05 % ophthalmic solution       BENICAR 40 mg tablet Take 40 mg by mouth once daily.        budesonide-formoterol 160-4.5 mcg (SYMBICORT) 160-4.5 mcg/actuation HFAA INHALE 2 PUFFS INTO THE LUNGS EVERY 12 (TWELVE) HOURS. 10.2 Inhaler 11    CLEVER CHOICE BLOOD GLUC SYS Misc       doxazosin (CARDURA) 2 MG tablet Take 2 mg by mouth once daily.       EMBRACE BLOOD GLUCOSE SYSTEM Strp       furosemide (LASIX) 80 MG tablet Take 1 tablet by mouth 2 (two) times daily.      gabapentin (NEURONTIN) 300 MG capsule Take 300 mg by mouth 2 (two) times daily.       hydrocodone-acetaminophen 10-325mg (NORCO)  mg Tab Take 1 tablet by mouth every 6 (six) hours as needed.      IRON HIGH POTENCY 240 mg (27 mg iron) tablet Take 1 tablet by mouth once daily.  1    LANCING DEVICE WITH LANCETS Misc       lidocaine-prilocaine (EMLA) cream       LITE TOUCH LANCETS 30 gauge Misc       loratadine (CLARITIN) 10 mg tablet Take 10 mg by mouth.      metformin (GLUCOPHAGE) 500 MG tablet Take 500 mg by mouth 2 (two) times daily with meals.      metolazone (ZAROXOLYN) 2.5 MG tablet Take 1 tablet by mouth once daily.      metoprolol tartrate (LOPRESSOR) 50 MG tablet Take 1 tablet by mouth 2 (two) times daily.      montelukast (SINGULAIR) 10 mg tablet TAKE 1 TABLET (10 MG TOTAL) BY MOUTH EVERY EVENING. 30 tablet 11    nabumetone (RELAFEN) 500 MG tablet TAKE 1 TABLET BY MOUTH EVERY DAY 30 tablet 2    omega-3 fatty acids (FISH OIL) 500 mg Cap Take 1 capsule by mouth once daily.      potassium chloride SA (K-DUR,KLOR-CON) 20 MEQ tablet Take 1 tablet by mouth once daily.      ranitidine (ZANTAC) 150 MG tablet Take 150 mg by mouth.      terbinafine (LAMISIL) 250 mg tablet       VITAMIN D2 50,000 unit capsule Take 1 capsule by mouth once a week.  0    vitamin E 1000 UNIT capsule Take 1,000 Units by mouth once daily.       No current facility-administered medications on file prior to visit.      /77  Pulse 75  Resp 12  Ht 5' (1.524 m)  Wt 116 kg (255 lb 11.7 oz)  BMI 49.94 kg/m2   ROS:  GENERAL: No fever,  chills, fatigability or weight loss.  SKIN: No rashes, itching or changes in color or texture of skin.  HEAD: No headaches or recent head trauma.  EYES: Visual acuity fine. No photophobia, ocular pain or diplopia.  EARS: Denies ear pain, discharge or vertigo.  NOSE: No loss of smell, no epistaxis or postnasal drip.  MOUTH & THROAT: No hoarseness or change in voice. No excessive gum bleeding.  NODES: Denies swollen glands.  CHEST: Denies JAIME, cyanosis, wheezing, cough and sputum production.  CARDIOVASCULAR: Denies chest pain, PND, orthopnea or reduced exercise tolerance.  ABDOMEN: Appetite fine. No weight loss. Denies diarrhea, abdominal pain, hematemesis or blood in stool.  URINARY: No flank pain, dysuria or hematuria.  PERIPHERAL VASCULAR: No claudication or cyanosis.  NEUROLOGIC: No history of seizures, paralysis, alteration of gait or coordination.  MUSCULOSKELETAL: See HPI    PE:  APPEARANCE: Well nourished, well developed, in no acute distress.   HEAD: Normocephalic, atraumatic.  EYES: PERRL. EOMI.   EARS: TM's intact. Light reflex normal. No retraction or perforation.   NOSE: Mucosa pink. Airway clear.  MOUTH & THROAT: No tonsillar enlargement. No pharyngeal erythema or exudate. No stridor.  NECK: Supple.   NODES: No cervical, axillary or inguinal lymph node enlargement.  CHEST: Lungs clear to auscultation.  CARDIOVASCULAR: Normal S1, S2. No rubs, murmurs or gallops.  ABDOMEN: Bowel sounds normal. Not distended. Soft. No tenderness or masses.  NEUROLOGIC: Cranial Nerves: II-XII grossly intact, also see MUSCULOSKELETAL  MUSCULOSKELETAL:     Right shoulder- 2 plus radial  artery and ulnar artery pulses, light touch intact Right upper extremity.  All digits are warm. No erythema, no warmth, no drainage, No swelling, no significant tenderness.  Positive impingement. Decrease ROM      Diagnosis:   1. Right shoulder impingement   2, Right shoulder arthritis    PLAN:  Follow up in 4 weeks   Will consider Right  shoulder arthroscope if no better  Continue occupational therapy      Injection:  The patient was placed in the supine position with   the right  shoulder facing me.  The shoulder was   prepped, sterily, with Alcohol and Betadine.  A  RefrigerantAnd coolant was used to locally anesthetize the skin over the posterior aspect of the subacromial space.  A one and a half inch, 27 gauge needle attached to A 3 cc synringe was placed into the subacromial space. A negative pressure was placed on the needle to   ensure the aspiration was placed into the space  And not into a blood vessel. 3cc of a 1%  Lidocaine   was mixed with 1/2 of a 80 mg solution of Depo-Medrol injected. The patient tolerated the shoulder injection well.  A Bandage was placed over the injection site.     Follow up in 4 weeks and consider shoulder ATS.

## 2017-05-05 NOTE — PATIENT INSTRUCTIONS
Shoulder Arthroscopy  The shoulder is your bodys most flexible joint. It lets the arm move in almost any direction. But this flexibility has a price -- it makes the joint prone to injury. If you have a shoulder problem, a surgical procedure called arthroscopy can help.     A camera in the arthroscope allows your surgeon to view your shoulder joint on a monitor.   Your orthopaedic evaluation  Your doctor will ask about your symptoms and the history of your shoulder problem. He or she will examine your shoulder and may give you tests, such as an X-ray or MRI. These help your doctor find the cause of your shoulder problem.  Arthroscopy: Looking inside your joint  Arthroscopy is a procedure that allows your doctor to see and work inside your shoulder joint. Your doctor makes small incision in your shoulder and inserts a long, thin, lighted instrument, called an arthroscope. During surgery, the arthroscope sends live video images from inside your joint to a screen that your doctor views. Using these images, your doctor can diagnose and treat your shoulder problem. Because arthroscopy uses much smaller incisions than open surgery, recovery is often shorter and less painful.  Risks and possible complications of shoulder arthroscopy  · Stiffness or ongoing pain in your shoulder  · Bleeding or blood clots  · Infection  · Damage to nerves or blood vessels  You may still need open surgery after having arthroscopy.  Date Last Reviewed: 9/10/2015  © 9156-3209 [employer]. [address] [place] [address], [place] [address]. All rights reserved. This information is not intended as a substitute for professional medical care. Always follow your healthcare professional's instructions.

## 2017-05-08 ENCOUNTER — TELEPHONE (OUTPATIENT)
Dept: ORTHOPEDICS | Facility: CLINIC | Age: 72
End: 2017-05-08

## 2017-05-08 ENCOUNTER — TELEPHONE (OUTPATIENT)
Dept: PULMONOLOGY | Facility: CLINIC | Age: 72
End: 2017-05-08

## 2017-05-08 NOTE — TELEPHONE ENCOUNTER
----- Message from Michelle Gresham sent at 5/8/2017 12:41 PM CDT -----  Contact: pt  Pt returning a missed call, pt can be reached at [phone]///[id number]

## 2017-05-08 NOTE — TELEPHONE ENCOUNTER
returned pt phone call. Informed pt that the missed call this morning was from pulmonology stating her referral to [facility] was faxed off. Pt verified understanding.

## 2017-05-08 NOTE — TELEPHONE ENCOUNTER
----- Message from Susanne Ross sent at 5/8/2017  9:08 AM CDT -----  Contact: Patient  Patient states she called last week to have a referral sent to [facility] for wheel chair evaluation, as of this time they have not received a referral, please call her back at [phone]. Thank you

## 2017-05-08 NOTE — TELEPHONE ENCOUNTER
Returned pt phone call. Called to inform pt the call was from pulmonology and not Dereje Avila's office. Pt was unavailable. Left message for pt to call back at earliest convenience.

## 2017-05-08 NOTE — TELEPHONE ENCOUNTER
----- Message from Laila Darden sent at 5/8/2017 11:38 AM CDT -----  Contact: pt- [phone]  Returning nurse call

## 2017-06-29 ENCOUNTER — TELEPHONE (OUTPATIENT)
Dept: PULMONOLOGY | Facility: CLINIC | Age: 72
End: 2017-06-29

## 2017-06-29 DIAGNOSIS — G47.33 OBSTRUCTIVE SLEEP APNEA: Primary | ICD-10-CM

## 2017-06-29 NOTE — TELEPHONE ENCOUNTER
----- Message from Gisela Padron sent at 6/28/2017  3:43 PM CDT -----  Contact: [place] with [facility]  [phone]   fax #[phone]  Pt needs new orders for Cpap supplies and mask

## 2017-07-11 ENCOUNTER — TELEPHONE (OUTPATIENT)
Dept: PULMONOLOGY | Facility: CLINIC | Age: 72
End: 2017-07-11

## 2017-07-11 DIAGNOSIS — J45.20 MILD INTERMITTENT ASTHMA WITHOUT COMPLICATION: Primary | Chronic | ICD-10-CM

## 2017-07-11 NOTE — TELEPHONE ENCOUNTER
----- Message from Gladys Castillo sent at 7/11/2017 12:05 PM CDT -----  Contact: Jessica with [facility]  Calling concerning orders for a Nebulizer Kit for patient. Please fax to [phone] and call  Jessica if need @ [phone] option 2. Thanks, lazaro

## 2017-07-28 ENCOUNTER — OFFICE VISIT (OUTPATIENT)
Dept: ORTHOPEDICS | Facility: CLINIC | Age: 72
End: 2017-07-28
Payer: MEDICARE

## 2017-07-28 VITALS
DIASTOLIC BLOOD PRESSURE: 77 MMHG | SYSTOLIC BLOOD PRESSURE: 163 MMHG | HEART RATE: 67 BPM | BODY MASS INDEX: 50.39 KG/M2 | WEIGHT: 256.63 LBS | HEIGHT: 60 IN

## 2017-07-28 DIAGNOSIS — S86.912A KNEE STRAIN, LEFT, INITIAL ENCOUNTER: Primary | ICD-10-CM

## 2017-07-28 PROCEDURE — 99999 PR PBB SHADOW E&M-EST. PATIENT-LVL V: CPT | Mod: PBBFAC,,, | Performed by: PHYSICIAN ASSISTANT

## 2017-07-28 PROCEDURE — 99213 OFFICE O/P EST LOW 20 MIN: CPT | Mod: S$GLB,,, | Performed by: PHYSICIAN ASSISTANT

## 2017-07-28 PROCEDURE — 1159F MED LIST DOCD IN RCRD: CPT | Mod: S$GLB,,, | Performed by: PHYSICIAN ASSISTANT

## 2017-07-28 PROCEDURE — 1125F AMNT PAIN NOTED PAIN PRSNT: CPT | Mod: S$GLB,,, | Performed by: PHYSICIAN ASSISTANT

## 2017-07-28 RX ORDER — DICLOFENAC SODIUM 10 MG/G
2 GEL TOPICAL 4 TIMES DAILY
Qty: 1 TUBE | Refills: 2 | Status: SHIPPED | OUTPATIENT
Start: 2017-07-28 | End: 2019-03-11

## 2017-07-28 NOTE — PATIENT INSTRUCTIONS
Knee Sprain    A sprain is an injury to the ligaments or capsule that holds a joint together. There are no broken bones. Most sprains take 3 to 6 weeks to heal. If it a severe sprain where the ligament is completely torn, it can take months to recover.  Most knee sprains are treated with a splint, knee immobilizer brace, or elastic wrap for support. Severe sprains may require surgery.  Home care  · Stay off the injured leg as much as possible until you can walk on it without pain. If you have a lot of pain with walking, crutches or a walker may be prescribed. (These can be rented or purchased at many pharmacies and surgical or orthopedic supply stores). Follow your healthcare provider's advice about when to begin putting weight on that leg.  · Keep your leg elevated to reduce pain and swelling. When sleeping, place a pillow under the injured leg. When sitting, support the injured leg so it is level with your waist. This is very important during the first 48 hours.  · Apply an ice pack over the injured area for 15 to 20 minutes every 3 to 6 hours. You should do this for the first 24 to 48 hours. You can make an ice pack by filling a plastic bag that seals at the top with ice cubes and then wrapping it with a thin towel. Continue to use ice packs for relief of pain and swelling as needed. As the ice melts, be careful to avoid getting your wrap, splint, or cast wet. After 48 hours, apply heat (warm shower or warm bath) for 15 to 20 minutes several times a day, or alternate ice and heat. You can place the ice pack directly over the splint. If you have to wear a hook-and-loop knee brace, you can open it to apply the ice pack, or heat, directly to the knee. Never put ice directly on the skin. Always wrap the ice in a towel or other type of cloth.  · You may use over-the-counter pain medicine to control pain, unless another pain medicine was prescribed.If you have chronic liver or kidney disease or ever had a stomach  ulcer or GI bleeding, talk with your healthcare provider before using these medicines.  · If you were given a splint, keep it completely dry at all times. Bathe with your splint out of the water, protected with 2 large plastic bags, rubber-banded at the top end. If a fiberglass splint gets wet, you can dry it with a hair dryer. If you have a hook-and-loop knee brace, you can remove this to bathe, unless told otherwise.  Follow-up care  Follow up with your doctor as advised. Any X-rays you had today dont show any broken bones, breaks, or fractures. Sometimes fractures dont show up on the first X-ray. Bruises and sprains can sometimes hurt as much as a fracture. These injuries can take time to heal completely. If your symptoms dont improve or they get worse, talk with your doctor. You may need a repeat X-ray. If X-rays were taken, you will be told of any new findings that may affect your care.  Call 911  Call 911 if you have:  ·  Shortness of breath  ·  Chest pain  When to seek medical advice  Call your healthcare provider right away if any of these occur:  · The splint or knee immobilizer brace becomes wet or soft  · The fiberglass cast or splint remains wet for more than 24 hours  · Pain or swelling increases  · The injured leg or toes become cold, blue, numb, or tingly  Date Last Reviewed: 11/20/2015  © 1774-5305 [employer]. [address] [place] [address], [place] [address]. All rights reserved. This information is not intended as a substitute for professional medical care. Always follow your healthcare professional's instructions.

## 2017-07-28 NOTE — PROGRESS NOTES
SUBJECTIVE:  Patient is status post left total knee 2009/2010  .      Was seen 4 weeks ago for right shoulder pain, received steroid injection, and now feels 100% better on the shoulder.    HPI: under went the above surgery, was better for a while. Over past few weeks ROM is decreasing, pain increasing on the medial side of the knee.  She's noticed that she change her diet to last week eating last wheat and gluten and the symptoms are improving.         Past Medical History:   Diagnosis Date    Arthritis     Asthma     COPD (chronic obstructive pulmonary disease)     Diabetes mellitus     Encounter for blood transfusion     Hypertension     Lung disease     Sleep apnea      Past Surgical History:   Procedure Laterality Date    APPENDECTOMY      CARDIAC SURGERY      valve replacement 12/2014    CATARACT EXTRACTION W/ INTRAOCULAR LENS  IMPLANT, BILATERAL      CHOLECYSTECTOMY      CYST REMOVAL      HERNIA REPAIR      left, 3/2015    HYSTERECTOMY      KNEE ARTHROPLASTY      shoulder       Review of patient's allergies indicates:   Allergen Reactions    Ace inhibitors Anaphylaxis    Ultram [tramadol] Anaphylaxis     Current Outpatient Prescriptions on File Prior to Visit   Medication Sig Dispense Refill    albuterol (ACCUNEB) 0.63 mg/3 mL Nebu INHALE 1 VIAL VIA NEBULIZER EVERY 6 HOURS AS NEEDED 375 mL 5    albuterol 90 mcg/actuation inhaler Inhale 2 puffs into the lungs every 4 (four) hours as needed for Wheezing. 18 g 11    ALCOHOL PREP PADS PadM       amlodipine (NORVASC) 10 MG tablet Take 10 mg by mouth once daily.       ammonium lactate 12 % Crea Apply 1 Applicatorful topically.      aspirin (ECOTRIN) 81 MG EC tablet Take 81 mg by mouth once daily.      atorvastatin (LIPITOR) 40 MG tablet Take 40 mg by mouth once daily.   1    azelastine (OPTIVAR) 0.05 % ophthalmic solution       BENICAR 40 mg tablet Take 40 mg by mouth once daily.       budesonide-formoterol 160-4.5 mcg (SYMBICORT)  160-4.5 mcg/actuation HFAA INHALE 2 PUFFS INTO THE LUNGS EVERY 12 (TWELVE) HOURS. 10.2 Inhaler 11    CLEVER CHOICE BLOOD GLUC SYS Misc       doxazosin (CARDURA) 2 MG tablet Take 2 mg by mouth once daily.       EMBRACE BLOOD GLUCOSE SYSTEM Strp       furosemide (LASIX) 80 MG tablet Take 1 tablet by mouth 2 (two) times daily.      gabapentin (NEURONTIN) 300 MG capsule Take 300 mg by mouth 2 (two) times daily.       hydrocodone-acetaminophen 10-325mg (NORCO)  mg Tab Take 1 tablet by mouth every 6 (six) hours as needed.      ILEVRO 0.3 % DrpS       IRON HIGH POTENCY 240 mg (27 mg iron) tablet Take 1 tablet by mouth once daily.  1    LANCING DEVICE WITH LANCETS Misc       lidocaine-prilocaine (EMLA) cream       LITE TOUCH LANCETS 30 gauge Misc       loratadine (CLARITIN) 10 mg tablet Take 10 mg by mouth.      metformin (GLUCOPHAGE) 500 MG tablet Take 500 mg by mouth 2 (two) times daily with meals.      metolazone (ZAROXOLYN) 2.5 MG tablet Take 1 tablet by mouth once daily.      metoprolol tartrate (LOPRESSOR) 50 MG tablet Take 1 tablet by mouth 2 (two) times daily.      montelukast (SINGULAIR) 10 mg tablet TAKE 1 TABLET (10 MG TOTAL) BY MOUTH EVERY EVENING. 30 tablet 11    nabumetone (RELAFEN) 500 MG tablet TAKE 1 TABLET BY MOUTH EVERY DAY 30 tablet 2    ofloxacin (OCUFLOX) 0.3 % ophthalmic solution       omega-3 fatty acids (FISH OIL) 500 mg Cap Take 1 capsule by mouth once daily.      potassium chloride SA (K-DUR,KLOR-CON) 20 MEQ tablet Take 1 tablet by mouth once daily.      prednisoLONE acetate (PRED FORTE) 1 % DrpS       ranitidine (ZANTAC) 150 MG tablet Take 150 mg by mouth.      terbinafine (LAMISIL) 250 mg tablet       VITAMIN D2 50,000 unit capsule Take 1 capsule by mouth once a week.  0    vitamin E 1000 UNIT capsule Take 1,000 Units by mouth once daily.       No current facility-administered medications on file prior to visit.      BP (!) 163/77   Pulse 67   Ht 5' (1.524 m)    Wt 116.4 kg (256 lb 9.9 oz)   BMI 50.12 kg/m²    ROS:  GENERAL: No fever, chills, fatigability or weight loss.  SKIN: No rashes, itching or changes in color or texture of skin.  HEAD: No headaches or recent head trauma.  EYES: Visual acuity fine. No photophobia, ocular pain or diplopia.  EARS: Denies ear pain, discharge or vertigo.  NOSE: No loss of smell, no epistaxis or postnasal drip.  MOUTH & THROAT: No hoarseness or change in voice. No excessive gum bleeding.  NODES: Denies swollen glands.  CHEST: Denies JAIME, cyanosis, wheezing, cough and sputum production.  CARDIOVASCULAR: Denies chest pain, PND, orthopnea or reduced exercise tolerance.  ABDOMEN: Appetite fine. No weight loss. Denies diarrhea, abdominal pain, hematemesis or blood in stool.  URINARY: No flank pain, dysuria or hematuria.  PERIPHERAL VASCULAR: No claudication or cyanosis.  NEUROLOGIC: No history of seizures, paralysis, alteration of gait or coordination.  MUSCULOSKELETAL: See HPI    PE:  APPEARANCE: Well nourished, well developed, in no acute distress.   HEAD: Normocephalic, atraumatic.  EYES: PERRL. EOMI.   EARS: TM's intact. Light reflex normal. No retraction or perforation.   NOSE: Mucosa pink. Airway clear.  MOUTH & THROAT: No tonsillar enlargement. No pharyngeal erythema or exudate. No stridor.  NECK: Supple.   NODES: No cervical, axillary or inguinal lymph node enlargement.  CHEST: Lungs clear to auscultation.  CARDIOVASCULAR: Normal S1, S2. No rubs, murmurs or gallops.  ABDOMEN: Bowel sounds normal. Not distended. Soft. No tenderness or masses.  NEUROLOGIC: Cranial Nerves: II-XII grossly intact, also see MUSCULOSKELETAL  MUSCULOSKELETAL:     Left knee- 2 plus radial  artery and ulnar artery pulses, light touch intact left lower extremity.  All digits are warm. No erythema, no warmth, no drainage, No swelling, mild tenderness along the medial lateral ligament area  Diagnosis:   1.  Left knee strain     PLAN:  Follow up in 4 weeks   Will  try Voltaren gel if no better in 4 weeks consider possible knee scope

## 2017-08-10 ENCOUNTER — LAB VISIT (OUTPATIENT)
Dept: LAB | Facility: HOSPITAL | Age: 72
End: 2017-08-10
Attending: INTERNAL MEDICINE
Payer: MEDICARE

## 2017-08-10 ENCOUNTER — OFFICE VISIT (OUTPATIENT)
Dept: NEPHROLOGY | Facility: CLINIC | Age: 72
End: 2017-08-10
Payer: MEDICARE

## 2017-08-10 VITALS
DIASTOLIC BLOOD PRESSURE: 74 MMHG | BODY MASS INDEX: 50.98 KG/M2 | HEART RATE: 80 BPM | WEIGHT: 259.69 LBS | HEIGHT: 60 IN | SYSTOLIC BLOOD PRESSURE: 118 MMHG

## 2017-08-10 DIAGNOSIS — E11.9 TYPE 2 DIABETES MELLITUS WITHOUT COMPLICATION, WITHOUT LONG-TERM CURRENT USE OF INSULIN: ICD-10-CM

## 2017-08-10 DIAGNOSIS — N17.9 AKI (ACUTE KIDNEY INJURY): Primary | ICD-10-CM

## 2017-08-10 DIAGNOSIS — E11.9 TYPE 2 DIABETES MELLITUS WITHOUT COMPLICATION, WITHOUT LONG-TERM CURRENT USE OF INSULIN: Primary | ICD-10-CM

## 2017-08-10 LAB
ALBUMIN SERPL BCP-MCNC: 3.4 G/DL
ANION GAP SERPL CALC-SCNC: 9 MMOL/L
BASOPHILS # BLD AUTO: 0.04 K/UL
BASOPHILS NFR BLD: 0.5 %
BUN SERPL-MCNC: 20 MG/DL
CALCIUM SERPL-MCNC: 9.2 MG/DL
CHLORIDE SERPL-SCNC: 102 MMOL/L
CO2 SERPL-SCNC: 30 MMOL/L
CREAT SERPL-MCNC: 0.9 MG/DL
DIFFERENTIAL METHOD: ABNORMAL
EOSINOPHIL # BLD AUTO: 0.1 K/UL
EOSINOPHIL NFR BLD: 1 %
ERYTHROCYTE [DISTWIDTH] IN BLOOD BY AUTOMATED COUNT: 14.6 %
EST. GFR  (AFRICAN AMERICAN): >60 ML/MIN/1.73 M^2
EST. GFR  (NON AFRICAN AMERICAN): >60 ML/MIN/1.73 M^2
GLUCOSE SERPL-MCNC: 105 MG/DL
HCT VFR BLD AUTO: 36.7 %
HGB BLD-MCNC: 11.9 G/DL
LYMPHOCYTES # BLD AUTO: 1.7 K/UL
LYMPHOCYTES NFR BLD: 21.6 %
MCH RBC QN AUTO: 27.3 PG
MCHC RBC AUTO-ENTMCNC: 32.4 G/DL
MCV RBC AUTO: 84 FL
MONOCYTES # BLD AUTO: 0.9 K/UL
MONOCYTES NFR BLD: 11.4 %
NEUTROPHILS # BLD AUTO: 5.2 K/UL
NEUTROPHILS NFR BLD: 65.5 %
PHOSPHATE SERPL-MCNC: 2.8 MG/DL
PLATELET # BLD AUTO: 172 K/UL
PMV BLD AUTO: 8.4 FL
POTASSIUM SERPL-SCNC: 3.7 MMOL/L
RBC # BLD AUTO: 4.36 M/UL
SODIUM SERPL-SCNC: 141 MMOL/L
WBC # BLD AUTO: 8 K/UL

## 2017-08-10 PROCEDURE — 3078F DIAST BP <80 MM HG: CPT | Mod: S$GLB,,, | Performed by: INTERNAL MEDICINE

## 2017-08-10 PROCEDURE — 1125F AMNT PAIN NOTED PAIN PRSNT: CPT | Mod: S$GLB,,, | Performed by: INTERNAL MEDICINE

## 2017-08-10 PROCEDURE — 1159F MED LIST DOCD IN RCRD: CPT | Mod: S$GLB,,, | Performed by: INTERNAL MEDICINE

## 2017-08-10 PROCEDURE — 3074F SYST BP LT 130 MM HG: CPT | Mod: S$GLB,,, | Performed by: INTERNAL MEDICINE

## 2017-08-10 PROCEDURE — 85025 COMPLETE CBC W/AUTO DIFF WBC: CPT

## 2017-08-10 PROCEDURE — 80069 RENAL FUNCTION PANEL: CPT

## 2017-08-10 PROCEDURE — 99205 OFFICE O/P NEW HI 60 MIN: CPT | Mod: S$GLB,,, | Performed by: INTERNAL MEDICINE

## 2017-08-10 PROCEDURE — 36415 COLL VENOUS BLD VENIPUNCTURE: CPT

## 2017-08-10 PROCEDURE — 99999 PR PBB SHADOW E&M-EST. PATIENT-LVL III: CPT | Mod: PBBFAC,,, | Performed by: INTERNAL MEDICINE

## 2017-08-10 NOTE — PROGRESS NOTES
NEPHROLOGY CONSULTATION    PHYSICIAN REQUESTING THE CONSULT: Dr. Christopher Jesus    REASON FOR CONSULTATION: Renal insufficiency    72 y.o. female with history of sleep apnea, HTN, DM2, Asthma, arthritis, obesity, s/p replaced heart valve presents to the renal clinic for evaluation of renal insufficiency. A consultation has been requested by the patient's PMD, Dr. Christopher Jesus. Patient reports that her GFR changed from 71 to 23 in July of 2017. Her PMD has requested nephrology consultation to follow up on this. Patient today presents to the clinic complaining of intermittent SOB, LE swelling, intermittent hand/foot paresthesia. She denies any headaches, congenital hearing loss, chest pain, hemoptysis, abdominal or flank pain, diarrhea, nausea/vomiting, hematuria, dysuria, rashes, nasal congestion. Patient reports strong NSAID use history. She had been on daily Relafen for many years in the past.   Patient has a longstanding history of DM2 that was diagnosed about 10 years ago. She is not aware of any associated diabetic retinopathy. Patient also reports > 15 year history of hypertension. BP is currently well controlled at 118/74. In addition, patient reports history of sleep apnea (she has CPAP at home, but only uses it occasionally), Asthma (had asthma as child, it did resurface as an adult several years ago, patient uses nebulizer and inhaler, well controlled overall), s/p heart valve replacement in 12/2014 (has pig valve, not on anticoagulation, likely aortic valve, details not clear). She denies any history of nephrolithiasis. Patient denies any history of renal disease in her family.     Past Medical History:   Diagnosis Date    Arthritis     Asthma     COPD (chronic obstructive pulmonary disease)     Diabetes mellitus     Encounter for blood transfusion     Hypertension     Lung disease     Sleep apnea        Past Surgical History:   Procedure Laterality Date    APPENDECTOMY      CARDIAC SURGERY       valve replacement 12/2014    CATARACT EXTRACTION W/ INTRAOCULAR LENS  IMPLANT, BILATERAL      CHOLECYSTECTOMY      CYST REMOVAL      HERNIA REPAIR      left, 3/2015    HYSTERECTOMY      KNEE ARTHROPLASTY      shoulder         Review of patient's allergies indicates:   Allergen Reactions    Ace inhibitors Anaphylaxis    Ultram [tramadol] Anaphylaxis       Current Outpatient Prescriptions   Medication Sig Dispense Refill    albuterol (ACCUNEB) 0.63 mg/3 mL Nebu INHALE 1 VIAL VIA NEBULIZER EVERY 6 HOURS AS NEEDED 375 mL 5    albuterol 90 mcg/actuation inhaler Inhale 2 puffs into the lungs every 4 (four) hours as needed for Wheezing. 18 g 11    ALCOHOL PREP PADS PadM       amlodipine (NORVASC) 10 MG tablet Take 10 mg by mouth once daily.       ammonium lactate 12 % Crea Apply 1 Applicatorful topically.      aspirin (ECOTRIN) 81 MG EC tablet Take 81 mg by mouth once daily.      atorvastatin (LIPITOR) 40 MG tablet Take 40 mg by mouth once daily.   1    azelastine (OPTIVAR) 0.05 % ophthalmic solution       BENICAR 40 mg tablet Take 40 mg by mouth once daily.       budesonide-formoterol 160-4.5 mcg (SYMBICORT) 160-4.5 mcg/actuation HFAA INHALE 2 PUFFS INTO THE LUNGS EVERY 12 (TWELVE) HOURS. 10.2 Inhaler 11    CLEVER CHOICE BLOOD GLUC SYS Misc       doxazosin (CARDURA) 2 MG tablet Take 2 mg by mouth once daily.       EMBRACE BLOOD GLUCOSE SYSTEM Strp       furosemide (LASIX) 80 MG tablet Take 1 tablet by mouth 2 (two) times daily.      gabapentin (NEURONTIN) 300 MG capsule Take 300 mg by mouth 2 (two) times daily.       hydrocodone-acetaminophen 10-325mg (NORCO)  mg Tab Take 1 tablet by mouth every 6 (six) hours as needed.      ILEVRO 0.3 % DrpS       IRON HIGH POTENCY 240 mg (27 mg iron) tablet Take 1 tablet by mouth once daily.  1    LANCING DEVICE WITH LANCETS Misc       lidocaine-prilocaine (EMLA) cream       LITE TOUCH LANCETS 30 gauge Misc       loratadine (CLARITIN) 10 mg  tablet Take 10 mg by mouth.      metformin (GLUCOPHAGE) 500 MG tablet Take 500 mg by mouth 2 (two) times daily with meals.      metolazone (ZAROXOLYN) 2.5 MG tablet Take 1 tablet by mouth once daily.      metoprolol tartrate (LOPRESSOR) 50 MG tablet Take 1 tablet by mouth 2 (two) times daily.      montelukast (SINGULAIR) 10 mg tablet TAKE 1 TABLET (10 MG TOTAL) BY MOUTH EVERY EVENING. 30 tablet 11    nabumetone (RELAFEN) 500 MG tablet TAKE 1 TABLET BY MOUTH EVERY DAY 30 tablet 2    ofloxacin (OCUFLOX) 0.3 % ophthalmic solution       omega-3 fatty acids (FISH OIL) 500 mg Cap Take 1 capsule by mouth once daily.      potassium chloride SA (K-DUR,KLOR-CON) 20 MEQ tablet Take 1 tablet by mouth once daily.      prednisoLONE acetate (PRED FORTE) 1 % DrpS       ranitidine (ZANTAC) 150 MG tablet Take 150 mg by mouth.      terbinafine (LAMISIL) 250 mg tablet       VITAMIN D2 50,000 unit capsule Take 1 capsule by mouth once a week.  0    vitamin E 1000 UNIT capsule Take 1,000 Units by mouth once daily.      diclofenac sodium 1 % Gel Apply 2 g topically 4 (four) times daily. 1 Tube 2     No current facility-administered medications for this visit.        Family History   Problem Relation Age of Onset    Cancer Mother     Diabetes Mother     Hypertension Mother     Cancer Father     Hypertension Father     Heart failure Maternal Grandfather     Heart failure Son        Social History     Social History    Marital status:      Spouse name: N/A    Number of children: N/A    Years of education: N/A     Occupational History    Not on file.     Social History Main Topics    Smoking status: Former Smoker     Packs/day: 1.00     Years: 50.00     Quit date: 12/14/2013    Smokeless tobacco: Never Used    Alcohol use No    Drug use: No    Sexual activity: Not on file     Other Topics Concern    Not on file     Social History Narrative    No narrative on file       Review of Systems:  1. GENERAL:  patient denies any fever, weight changes, generalized weakness, dizziness.  2. HEENT: patient denies headaches, visual disturbances, swallowing problems, sinus pain, nasal congestion.  3. CARDIOVASCULAR: patient denies chest pain, palpitations.  4. PULMONARY: patient reports intermittent SOB, no coughing, hemoptysis, wheezing.  5. GASTROINTESTINAL: patient denies abdominal pain, nausea, vomiting, diarrhea.  6. GENITOURINARY: patient denies dysuria, hematuria, hesitancy, frequency.  7. EXTREMITIES: patient reports  LE edema, no LE cramping.  8. DERMATOLOGY: patient denies rashes, ulcers.  9. NEURO: patient denies tremors, extremity weakness, she reports extremity numbness/tingling.  10. MUSCULOSKELETAL: patient denies joint pain, joint swelling.  11. HEMATOLOGY: patient denies rectal or gum bleeding.  12: PSYCH: patient denies anxiety, depression.      PHYSICAL EXAM:    /74   Pulse 80   Ht 5' (1.524 m)   Wt 117.8 kg (259 lb 11.2 oz)   BMI 50.72 kg/m²     GENERAL: Pleasant overweight lady presents to clinic with non-labored breathing.  HEENT: PER, no nasal discharge, no icterus, no oral exudates, moist mucosal membranes.  NECK: no thyroid mass, no lymphadenopathy.  HEART: RRR S1/S2, no rubs, good peripheral pulses.  LUNGS: CTA bilaterally, no wheezing, breathing is nonlabored.  ABDOMEN: soft, nontender, not distended, bowel sounds are present, no abdominal hernia.  EXTREM: bilateral trace LE edema.  SKIN: no rashes, skin is warm and dry.  NEURO: A & O x 3, no obvious focal signs.    LABORATORY RESULTS:    Lab Results   Component Value Date    CREATININE 0.9 08/10/2017    BUN 20 08/10/2017     08/10/2017    K 3.7 08/10/2017     08/10/2017    CO2 30 (H) 08/10/2017      Lab Results   Component Value Date    CALCIUM 9.2 08/10/2017    PHOS 2.8 08/10/2017     Lab Results   Component Value Date    ALBUMIN 3.4 (L) 08/10/2017     Lab Results   Component Value Date    WBC 8.00 08/10/2017    HGB 11.9 (L)  08/10/2017    HCT 36.7 (L) 08/10/2017    MCV 84 08/10/2017     08/10/2017     Urinalysis: no protein, no blood (8/10/17)      ASSESSMENT AND PLAN:  72 y.o. female with history of sleep apnea, HTN, DM2, Asthma, arthritis, obesity, s/p replaced heart valve presents to the renal clinic for evaluation of renal insufficiency.    1. Renal insufficiency: Patient had likely episode of GINO in July 2017 when her GFR declined from 71 to 23. GFR has now returned to > 60 ml/min. Patient likely suffered from GINO due to dehydration. She confirms that she may have been dehydrated during that time.  Patient's renal function will be monitored closely and she will return to the clinic in 1 month for follow up. I will order a renal panel,  Urinalysis prior to his return visit. Patient was advised to hydrate with 60-80 ounces of water per day and to avoid NSAID pain medications such as advil, aleve, motrin, ibuprofen, naprosyn, meloxicam, diclofenac, mobic.     2. Electrolytes: Within normal limits.    3. Acid base status: No acute issues.    4. Volume: Trace LE edema. Continue Lasix.     5. Hypertension: Good BP control.     6. Medications: Reviewed. Agree with current medical regimen.       Thank you very much for this consult. Please see my note in Epic for recommendations.    Total time spent was about 45 min. 50 % or more was spend on counseling about treatment options disease etiology. Level 5 consult.

## 2017-08-10 NOTE — PATIENT INSTRUCTIONS
Hydrate with 60-80 ounces of water per day.     Avoid NSAID pain medications such as advil, aleve, motrin, ibuprofen, naprosyn, meloxicam, diclofenac, mobic.     Aspirin OK to use.    Tylenol OK for pain.

## 2017-09-06 DIAGNOSIS — N17.9 AKI (ACUTE KIDNEY INJURY): Primary | ICD-10-CM

## 2017-09-07 ENCOUNTER — LAB VISIT (OUTPATIENT)
Dept: LAB | Facility: HOSPITAL | Age: 72
End: 2017-09-07
Payer: MEDICARE

## 2017-09-07 ENCOUNTER — OFFICE VISIT (OUTPATIENT)
Dept: NEPHROLOGY | Facility: CLINIC | Age: 72
End: 2017-09-07
Payer: MEDICARE

## 2017-09-07 VITALS
SYSTOLIC BLOOD PRESSURE: 144 MMHG | BODY MASS INDEX: 50.51 KG/M2 | HEIGHT: 60 IN | HEART RATE: 74 BPM | WEIGHT: 257.25 LBS | DIASTOLIC BLOOD PRESSURE: 60 MMHG

## 2017-09-07 DIAGNOSIS — N17.9 AKI (ACUTE KIDNEY INJURY): ICD-10-CM

## 2017-09-07 DIAGNOSIS — N17.9 AKI (ACUTE KIDNEY INJURY): Primary | ICD-10-CM

## 2017-09-07 LAB
ALBUMIN SERPL BCP-MCNC: 3.4 G/DL
ANION GAP SERPL CALC-SCNC: 10 MMOL/L
BASOPHILS # BLD AUTO: 0.04 K/UL
BASOPHILS NFR BLD: 0.5 %
BUN SERPL-MCNC: 27 MG/DL
CALCIUM SERPL-MCNC: 9.3 MG/DL
CHLORIDE SERPL-SCNC: 101 MMOL/L
CO2 SERPL-SCNC: 30 MMOL/L
CREAT SERPL-MCNC: 1.2 MG/DL
DIFFERENTIAL METHOD: ABNORMAL
EOSINOPHIL # BLD AUTO: 0.1 K/UL
EOSINOPHIL NFR BLD: 1.6 %
ERYTHROCYTE [DISTWIDTH] IN BLOOD BY AUTOMATED COUNT: 14.6 %
EST. GFR  (AFRICAN AMERICAN): 52 ML/MIN/1.73 M^2
EST. GFR  (NON AFRICAN AMERICAN): 45 ML/MIN/1.73 M^2
GLUCOSE SERPL-MCNC: 125 MG/DL
HCT VFR BLD AUTO: 38.9 %
HGB BLD-MCNC: 12.8 G/DL
LYMPHOCYTES # BLD AUTO: 2 K/UL
LYMPHOCYTES NFR BLD: 23.5 %
MCH RBC QN AUTO: 27.4 PG
MCHC RBC AUTO-ENTMCNC: 32.9 G/DL
MCV RBC AUTO: 83 FL
MONOCYTES # BLD AUTO: 1 K/UL
MONOCYTES NFR BLD: 11.9 %
NEUTROPHILS # BLD AUTO: 5.2 K/UL
NEUTROPHILS NFR BLD: 62.5 %
PHOSPHATE SERPL-MCNC: 2.5 MG/DL
PLATELET # BLD AUTO: 198 K/UL
PMV BLD AUTO: 8.7 FL
POTASSIUM SERPL-SCNC: 3.7 MMOL/L
PTH-INTACT SERPL-MCNC: 205.6 PG/ML
RBC # BLD AUTO: 4.67 M/UL
SODIUM SERPL-SCNC: 141 MMOL/L
WBC # BLD AUTO: 8.33 K/UL

## 2017-09-07 PROCEDURE — 99213 OFFICE O/P EST LOW 20 MIN: CPT | Mod: S$GLB,,, | Performed by: INTERNAL MEDICINE

## 2017-09-07 PROCEDURE — 3077F SYST BP >= 140 MM HG: CPT | Mod: S$GLB,,, | Performed by: INTERNAL MEDICINE

## 2017-09-07 PROCEDURE — 85025 COMPLETE CBC W/AUTO DIFF WBC: CPT

## 2017-09-07 PROCEDURE — 99999 PR PBB SHADOW E&M-EST. PATIENT-LVL II: CPT | Mod: PBBFAC,,, | Performed by: INTERNAL MEDICINE

## 2017-09-07 PROCEDURE — 1125F AMNT PAIN NOTED PAIN PRSNT: CPT | Mod: S$GLB,,, | Performed by: INTERNAL MEDICINE

## 2017-09-07 PROCEDURE — 83970 ASSAY OF PARATHORMONE: CPT

## 2017-09-07 PROCEDURE — 80069 RENAL FUNCTION PANEL: CPT

## 2017-09-07 PROCEDURE — 36415 COLL VENOUS BLD VENIPUNCTURE: CPT

## 2017-09-07 PROCEDURE — 3008F BODY MASS INDEX DOCD: CPT | Mod: S$GLB,,, | Performed by: INTERNAL MEDICINE

## 2017-09-07 PROCEDURE — 3078F DIAST BP <80 MM HG: CPT | Mod: S$GLB,,, | Performed by: INTERNAL MEDICINE

## 2017-09-07 PROCEDURE — 1159F MED LIST DOCD IN RCRD: CPT | Mod: S$GLB,,, | Performed by: INTERNAL MEDICINE

## 2017-09-07 NOTE — PROGRESS NOTES
PROGRESS NOTE FOR ESTABLISHED PATIENT    PHYSICIAN REQUESTING THE CONSULT: Dr. Christopher Jesus    REASON FOR VISIT: Renal insufficiency    72 y.o. female with history of sleep apnea, HTN, DM2, Asthma, arthritis, obesity, s/p replaced heart valve presents to the renal clinic for evaluation of renal insufficiency.     Patient reports mild intermittent back pain.         Past Medical History:   Diagnosis Date    Arthritis     Asthma     COPD (chronic obstructive pulmonary disease)     Diabetes mellitus     Encounter for blood transfusion     Hypertension     Lung disease     Sleep apnea        Past Surgical History:   Procedure Laterality Date    APPENDECTOMY      CARDIAC SURGERY      valve replacement 12/2014    CATARACT EXTRACTION W/ INTRAOCULAR LENS  IMPLANT, BILATERAL      CHOLECYSTECTOMY      CYST REMOVAL      HERNIA REPAIR      left, 3/2015    HYSTERECTOMY      KNEE ARTHROPLASTY      shoulder         Review of patient's allergies indicates:   Allergen Reactions    Ace inhibitors Anaphylaxis    Ultram [tramadol] Anaphylaxis       Current Outpatient Prescriptions   Medication Sig Dispense Refill    albuterol (ACCUNEB) 0.63 mg/3 mL Nebu INHALE 1 VIAL VIA NEBULIZER EVERY 6 HOURS AS NEEDED 375 mL 5    albuterol 90 mcg/actuation inhaler Inhale 2 puffs into the lungs every 4 (four) hours as needed for Wheezing. 18 g 11    ALCOHOL PREP PADS PadM       amlodipine (NORVASC) 10 MG tablet Take 10 mg by mouth once daily.       ammonium lactate 12 % Crea Apply 1 Applicatorful topically.      aspirin (ECOTRIN) 81 MG EC tablet Take 81 mg by mouth once daily.      atorvastatin (LIPITOR) 40 MG tablet Take 40 mg by mouth once daily.   1    azelastine (OPTIVAR) 0.05 % ophthalmic solution       BENICAR 40 mg tablet Take 40 mg by mouth once daily.       budesonide-formoterol 160-4.5 mcg (SYMBICORT) 160-4.5 mcg/actuation HFAA INHALE 2 PUFFS INTO THE LUNGS EVERY 12 (TWELVE) HOURS. 10.2 Inhaler 11    LEONARDO  CHOICE BLOOD GLUC SYS Misc       doxazosin (CARDURA) 2 MG tablet Take 2 mg by mouth once daily.       EMBRACE BLOOD GLUCOSE SYSTEM Strp       furosemide (LASIX) 80 MG tablet Take 1 tablet by mouth 2 (two) times daily.      gabapentin (NEURONTIN) 300 MG capsule Take 300 mg by mouth 2 (two) times daily.       hydrocodone-acetaminophen 10-325mg (NORCO)  mg Tab Take 1 tablet by mouth every 6 (six) hours as needed.      ILEVRO 0.3 % DrpS       IRON HIGH POTENCY 240 mg (27 mg iron) tablet Take 1 tablet by mouth once daily.  1    LANCING DEVICE WITH LANCETS Misc       lidocaine-prilocaine (EMLA) cream       LITE TOUCH LANCETS 30 gauge Misc       loratadine (CLARITIN) 10 mg tablet Take 10 mg by mouth.      metformin (GLUCOPHAGE) 500 MG tablet Take 500 mg by mouth 2 (two) times daily with meals.      metolazone (ZAROXOLYN) 2.5 MG tablet Take 1 tablet by mouth once daily.      metoprolol tartrate (LOPRESSOR) 50 MG tablet Take 1 tablet by mouth 2 (two) times daily.      montelukast (SINGULAIR) 10 mg tablet TAKE 1 TABLET (10 MG TOTAL) BY MOUTH EVERY EVENING. 30 tablet 11    nabumetone (RELAFEN) 500 MG tablet TAKE 1 TABLET BY MOUTH EVERY DAY 30 tablet 2    ofloxacin (OCUFLOX) 0.3 % ophthalmic solution       omega-3 fatty acids (FISH OIL) 500 mg Cap Take 1 capsule by mouth once daily.      potassium chloride SA (K-DUR,KLOR-CON) 20 MEQ tablet Take 1 tablet by mouth once daily.      prednisoLONE acetate (PRED FORTE) 1 % DrpS       ranitidine (ZANTAC) 150 MG tablet Take 150 mg by mouth.      terbinafine (LAMISIL) 250 mg tablet       VITAMIN D2 50,000 unit capsule Take 1 capsule by mouth once a week.  0    vitamin E 1000 UNIT capsule Take 1,000 Units by mouth once daily.      diclofenac sodium 1 % Gel Apply 2 g topically 4 (four) times daily. 1 Tube 2     No current facility-administered medications for this visit.        Family History   Problem Relation Age of Onset    Cancer Mother     Diabetes  Mother     Hypertension Mother     Cancer Father     Hypertension Father     Heart failure Maternal Grandfather     Heart failure Son        Social History     Social History    Marital status:      Spouse name: N/A    Number of children: N/A    Years of education: N/A     Occupational History    Not on file.     Social History Main Topics    Smoking status: Former Smoker     Packs/day: 1.00     Years: 50.00     Quit date: 12/14/2013    Smokeless tobacco: Never Used    Alcohol use No    Drug use: No    Sexual activity: Not on file     Other Topics Concern    Not on file     Social History Narrative    No narrative on file       Review of Systems:  1. GENERAL: patient denies any fever, weight changes, generalized weakness, dizziness.  2. HEENT: patient denies headaches, visual disturbances, swallowing problems, sinus pain, nasal congestion.  3. CARDIOVASCULAR: patient denies chest pain, palpitations.  4. PULMONARY: patient denies SOB, no coughing, hemoptysis, wheezing.  5. GASTROINTESTINAL: patient denies abdominal pain, nausea, vomiting, diarrhea.  6. GENITOURINARY: patient denies dysuria, hematuria, hesitancy, frequency.  7. EXTREMITIES: patient denies LE edema, no LE cramping.  8. DERMATOLOGY: patient denies rashes, ulcers.  9. NEURO: patient denies tremors, extremity weakness, she reports intermittent extremity numbness/tingling.  10. MUSCULOSKELETAL: patient denies joint pain, joint swelling, she reports intermittent back pain.   11. HEMATOLOGY: patient denies rectal or gum bleeding.  12: PSYCH: patient denies anxiety, depression.      PHYSICAL EXAM:    BP (!) 144/60   Pulse 74   Ht 5' (1.524 m)   Wt 116.7 kg (257 lb 4.4 oz)   BMI 50.25 kg/m²     GENERAL: Pleasant overweight lady presents to clinic with non-labored breathing.  HEENT: PER, no nasal discharge, no icterus, no oral exudates, moist mucosal membranes.  NECK: no thyroid mass, no lymphadenopathy.  HEART: RRR S1/S2, no rubs,  good peripheral pulses.  LUNGS: CTA bilaterally, no wheezing, breathing is nonlabored.  ABDOMEN: soft, nontender, not distended, bowel sounds are present, no abdominal hernia.  EXTREM: bilateral trace LE edema.  SKIN: no rashes, skin is warm and dry.  NEURO: A & O x 3, no obvious focal signs.    LABORATORY RESULTS:    Lab Results   Component Value Date    CREATININE 0.9 08/10/2017    BUN 20 08/10/2017     08/10/2017    K 3.7 08/10/2017     08/10/2017    CO2 30 (H) 08/10/2017      Lab Results   Component Value Date    CALCIUM 9.2 08/10/2017    PHOS 2.8 08/10/2017     Lab Results   Component Value Date    ALBUMIN 3.4 (L) 08/10/2017     Lab Results   Component Value Date    WBC 8.33 09/07/2017    HGB 12.8 09/07/2017    HCT 38.9 09/07/2017    MCV 83 09/07/2017     09/07/2017     Urinalysis: no protein, no blood (8/10/17)      ASSESSMENT AND PLAN:  72 y.o. female with history of sleep apnea, HTN, DM2, Asthma, arthritis, obesity, s/p replaced heart valve presents to the renal clinic for evaluation of renal insufficiency.    1. Renal insufficiency: Patient had likely episode of GINO in July 2017 when her GFR declined from 71 to 23. GFR has since then returned to > 60 ml/min. Patient likely suffered from GINO due to dehydration. She confirms that she may have been dehydrated during that time.  Will follow up on today's labs. If renal function remains stable, patient will be discharged from clinic.       2. Electrolytes: Within normal limits.    3. Acid base status: No acute issues.    4. Volume: Trace LE edema. Continue Lasix.     5. Hypertension: Good BP control.     6. Medications: Reviewed. Agree with current medical regimen.

## 2017-09-11 ENCOUNTER — TELEPHONE (OUTPATIENT)
Dept: PULMONOLOGY | Facility: CLINIC | Age: 72
End: 2017-09-11

## 2017-09-11 DIAGNOSIS — J45.909 UNCOMPLICATED ASTHMA, UNSPECIFIED ASTHMA SEVERITY: Primary | ICD-10-CM

## 2017-09-11 NOTE — TELEPHONE ENCOUNTER
----- Message from Naseem Rosen sent at 9/11/2017 10:58 AM CDT -----  Contact: Pt  Pt needs to speak with nurse regarding a new nebulizer machine, pt states that a part off of it. Please give pt a call at ..[phone] (home)

## 2017-09-12 ENCOUNTER — TELEPHONE (OUTPATIENT)
Dept: PULMONOLOGY | Facility: HOSPITAL | Age: 72
End: 2017-09-12

## 2017-09-12 NOTE — TELEPHONE ENCOUNTER
----- Message from Tio Onofre sent at 9/12/2017  8:40 AM CDT -----  Contact: [facility]- [phone]  Would like to consult with nurse about nebulizer kit repair. Need medical necessity form, clinic info and order. Please fax back to [phone]. Any questions please call [phone]. Thx.

## 2017-09-13 ENCOUNTER — TELEPHONE (OUTPATIENT)
Dept: PULMONOLOGY | Facility: CLINIC | Age: 72
End: 2017-09-13

## 2017-09-13 DIAGNOSIS — J45.909 UNCOMPLICATED ASTHMA, UNSPECIFIED ASTHMA SEVERITY: Primary | ICD-10-CM

## 2017-11-15 ENCOUNTER — TELEPHONE (OUTPATIENT)
Dept: ORTHOPEDICS | Facility: CLINIC | Age: 72
End: 2017-11-15

## 2017-11-15 NOTE — TELEPHONE ENCOUNTER
Spoke with the patient whom was scheduled for the wrong provider's schedule. The patient has been moved to Dr. Teixeira's schedule. -AS

## 2018-01-12 DIAGNOSIS — M25.562 ACUTE PAIN OF LEFT KNEE: Primary | ICD-10-CM

## 2018-01-16 ENCOUNTER — HOSPITAL ENCOUNTER (OUTPATIENT)
Dept: RADIOLOGY | Facility: HOSPITAL | Age: 73
Discharge: HOME | End: 2018-01-16
Attending: ORTHOPAEDIC SURGERY
Payer: MEDICARE

## 2018-01-16 ENCOUNTER — OFFICE VISIT (OUTPATIENT)
Dept: ORTHOPEDICS | Facility: CLINIC | Age: 73
End: 2018-01-16
Payer: MEDICARE

## 2018-01-16 VITALS
HEIGHT: 60 IN | DIASTOLIC BLOOD PRESSURE: 73 MMHG | SYSTOLIC BLOOD PRESSURE: 134 MMHG | HEART RATE: 72 BPM | RESPIRATION RATE: 18 BRPM | WEIGHT: 257.25 LBS | BODY MASS INDEX: 50.51 KG/M2

## 2018-01-16 DIAGNOSIS — M25.562 ACUTE PAIN OF LEFT KNEE: ICD-10-CM

## 2018-01-16 DIAGNOSIS — M65.9 SYNOVITIS OF LEFT KNEE: Primary | ICD-10-CM

## 2018-01-16 PROCEDURE — 73560 X-RAY EXAM OF KNEE 1 OR 2: CPT | Mod: 26,59,RT, | Performed by: RADIOLOGY

## 2018-01-16 PROCEDURE — 73560 X-RAY EXAM OF KNEE 1 OR 2: CPT | Mod: TC,FY,PO,RT,59

## 2018-01-16 PROCEDURE — 99999 PR PBB SHADOW E&M-EST. PATIENT-LVL III: CPT | Mod: PBBFAC,,, | Performed by: ORTHOPAEDIC SURGERY

## 2018-01-16 PROCEDURE — 73562 X-RAY EXAM OF KNEE 3: CPT | Mod: 26,LT,, | Performed by: RADIOLOGY

## 2018-01-16 PROCEDURE — 20610 DRAIN/INJ JOINT/BURSA W/O US: CPT | Mod: LT,S$GLB,, | Performed by: ORTHOPAEDIC SURGERY

## 2018-01-16 PROCEDURE — 99214 OFFICE O/P EST MOD 30 MIN: CPT | Mod: 25,S$GLB,, | Performed by: ORTHOPAEDIC SURGERY

## 2018-01-16 RX ORDER — FUROSEMIDE 40 MG/1
40 TABLET ORAL 2 TIMES DAILY
Refills: 0 | COMMUNITY
Start: 2017-10-20 | End: 2018-04-10 | Stop reason: SDUPTHER

## 2018-01-16 RX ORDER — METHYLPREDNISOLONE ACETATE 80 MG/ML
80 INJECTION, SUSPENSION INTRA-ARTICULAR; INTRALESIONAL; INTRAMUSCULAR; SOFT TISSUE
Status: COMPLETED | OUTPATIENT
Start: 2018-01-16 | End: 2018-01-16

## 2018-01-16 RX ORDER — ALENDRONATE SODIUM 70 MG/1
TABLET ORAL
Refills: 1 | COMMUNITY
Start: 2017-12-15 | End: 2019-03-11

## 2018-01-16 RX ORDER — MELOXICAM 7.5 MG/1
7.5 TABLET ORAL DAILY
Refills: 0 | COMMUNITY
Start: 2018-01-05 | End: 2018-07-26

## 2018-01-16 RX ORDER — ACETAMINOPHEN AND CODEINE PHOSPHATE 300; 30 MG/1; MG/1
1 TABLET ORAL EVERY 6 HOURS PRN
Refills: 0 | COMMUNITY
Start: 2018-01-05 | End: 2018-07-26

## 2018-01-16 RX ADMIN — METHYLPREDNISOLONE ACETATE 80 MG: 80 INJECTION, SUSPENSION INTRA-ARTICULAR; INTRALESIONAL; INTRAMUSCULAR; SOFT TISSUE at 02:01

## 2018-01-16 NOTE — PROGRESS NOTES
SUBJECTIVE:  Patient is status post left total knee 2009/2010  .      Was seen several weeks ago for right shoulder pain, received steroid injection, and now feels 100% better on the shoulder.    HPI: under went the above surgery, was better for a while. Over past few weeks ROM is decreasing, pain increasing on the medial side of the knee.  She's noticed that she change her diet to last week eating last wheat and gluten and the symptoms are improving.         Past Medical History:   Diagnosis Date    Arthritis     Asthma     COPD (chronic obstructive pulmonary disease)     Diabetes mellitus     Encounter for blood transfusion     Hypertension     Lung disease     Sleep apnea      Past Surgical History:   Procedure Laterality Date    APPENDECTOMY      CARDIAC SURGERY      valve replacement 12/2014    CATARACT EXTRACTION W/ INTRAOCULAR LENS  IMPLANT, BILATERAL      CHOLECYSTECTOMY      CYST REMOVAL      HERNIA REPAIR      left, 3/2015    HYSTERECTOMY      KNEE ARTHROPLASTY      shoulder       Review of patient's allergies indicates:   Allergen Reactions    Ace inhibitors Anaphylaxis    Ultram [tramadol] Anaphylaxis     Current Outpatient Prescriptions on File Prior to Visit   Medication Sig Dispense Refill    albuterol (ACCUNEB) 0.63 mg/3 mL Nebu INHALE 1 VIAL VIA NEBULIZER EVERY 6 HOURS AS NEEDED 375 mL 5    albuterol 90 mcg/actuation inhaler Inhale 2 puffs into the lungs every 4 (four) hours as needed for Wheezing. 18 g 11    ALCOHOL PREP PADS PadM       amlodipine (NORVASC) 10 MG tablet Take 10 mg by mouth once daily.       ammonium lactate 12 % Crea Apply 1 Applicatorful topically.      aspirin (ECOTRIN) 81 MG EC tablet Take 81 mg by mouth once daily.      atorvastatin (LIPITOR) 40 MG tablet Take 40 mg by mouth once daily.   1    azelastine (OPTIVAR) 0.05 % ophthalmic solution       BENICAR 40 mg tablet Take 40 mg by mouth once daily.       budesonide-formoterol 160-4.5 mcg (SYMBICORT)  160-4.5 mcg/actuation HFAA INHALE 2 PUFFS INTO THE LUNGS EVERY 12 (TWELVE) HOURS. 10.2 Inhaler 11    CLEVER CHOICE BLOOD GLUC SYS Misc       doxazosin (CARDURA) 2 MG tablet Take 2 mg by mouth once daily.       EMBRACE BLOOD GLUCOSE SYSTEM Strp       furosemide (LASIX) 80 MG tablet Take 1 tablet by mouth 2 (two) times daily.      gabapentin (NEURONTIN) 300 MG capsule Take 300 mg by mouth 2 (two) times daily.       hydrocodone-acetaminophen 10-325mg (NORCO)  mg Tab Take 1 tablet by mouth every 6 (six) hours as needed.      ILEVRO 0.3 % DrpS       IRON HIGH POTENCY 240 mg (27 mg iron) tablet Take 1 tablet by mouth once daily.  1    LANCING DEVICE WITH LANCETS Misc       lidocaine-prilocaine (EMLA) cream       LITE TOUCH LANCETS 30 gauge Misc       loratadine (CLARITIN) 10 mg tablet Take 10 mg by mouth.      metformin (GLUCOPHAGE) 500 MG tablet Take 500 mg by mouth 2 (two) times daily with meals.      metolazone (ZAROXOLYN) 2.5 MG tablet Take 1 tablet by mouth once daily.      metoprolol tartrate (LOPRESSOR) 50 MG tablet Take 1 tablet by mouth 2 (two) times daily.      montelukast (SINGULAIR) 10 mg tablet TAKE 1 TABLET (10 MG TOTAL) BY MOUTH EVERY EVENING. 30 tablet 11    nabumetone (RELAFEN) 500 MG tablet TAKE 1 TABLET BY MOUTH EVERY DAY 30 tablet 2    ofloxacin (OCUFLOX) 0.3 % ophthalmic solution       omega-3 fatty acids (FISH OIL) 500 mg Cap Take 1 capsule by mouth once daily.      potassium chloride SA (K-DUR,KLOR-CON) 20 MEQ tablet Take 1 tablet by mouth once daily.      prednisoLONE acetate (PRED FORTE) 1 % DrpS       ranitidine (ZANTAC) 150 MG tablet Take 150 mg by mouth.      terbinafine (LAMISIL) 250 mg tablet       VITAMIN D2 50,000 unit capsule Take 1 capsule by mouth once a week.  0    vitamin E 1000 UNIT capsule Take 1,000 Units by mouth once daily.      diclofenac sodium 1 % Gel Apply 2 g topically 4 (four) times daily. 1 Tube 2     No current facility-administered  medications on file prior to visit.      /73   Pulse 72   Resp 18   Ht 5' (1.524 m)   Wt 116.7 kg (257 lb 4.4 oz)   BMI 50.25 kg/m²    ROS:  GENERAL: No fever, chills, fatigability or weight loss.  SKIN: No rashes, itching or changes in color or texture of skin.  HEAD: No headaches or recent head trauma.  EYES: Visual acuity fine. No photophobia, ocular pain or diplopia.  EARS: Denies ear pain, discharge or vertigo.  NOSE: No loss of smell, no epistaxis or postnasal drip.  MOUTH & THROAT: No hoarseness or change in voice. No excessive gum bleeding.  NODES: Denies swollen glands.  CHEST: Denies JAIME, cyanosis, wheezing, cough and sputum production.  CARDIOVASCULAR: Denies chest pain, PND, orthopnea or reduced exercise tolerance.  ABDOMEN: Appetite fine. No weight loss. Denies diarrhea, abdominal pain, hematemesis or blood in stool.  URINARY: No flank pain, dysuria or hematuria.  PERIPHERAL VASCULAR: No claudication or cyanosis.  NEUROLOGIC: No history of seizures, paralysis, alteration of gait or coordination.  MUSCULOSKELETAL: See HPI    PE:  APPEARANCE: Well nourished, well developed, in no acute distress.   HEAD: Normocephalic, atraumatic.  EYES: PERRL. EOMI.   EARS: TM's intact. Light reflex normal. No retraction or perforation.   NOSE: Mucosa pink. Airway clear.  MOUTH & THROAT: No tonsillar enlargement. No pharyngeal erythema or exudate. No stridor.  NECK: Supple.   NODES: No cervical, axillary or inguinal lymph node enlargement.  CHEST: Lungs clear to auscultation.  CARDIOVASCULAR: Normal S1, S2. No rubs, murmurs or gallops.  ABDOMEN: Bowel sounds normal. Not distended. Soft. No tenderness or masses.  NEUROLOGIC: Cranial Nerves: II-XII grossly intact, also see MUSCULOSKELETAL  MUSCULOSKELETAL:     Left knee- 2 plus radial  artery and ulnar artery pulses, light touch intact left lower extremity.  All digits are warm. No erythema, no warmth, no drainage, No swelling, mild tenderness along the medial  lateral ligament area  Diagnosis:   1.  Left knee synovitis     PLAN:  Follow up in 12 weeks         Injection:  The patient was placed in the supine position with   the left leg near   the end of the bed facing me.  The left knee   was placed over a nonsterile pad.The Knee was   prepped, sterily, with Alcohol and Betadine.  A  Refrigerant  And coolant was used to locally anesthetize the skin   over the superior lateral aspect of the knee.  A one and   a half inch, 27 gauge needle attached to   A 10 cc synringe was placed into the lateral joint.   A negative pressure was placed on the needle to   ensure the aspiration was placed into the joint  And not into a blood vessel.  4 cc of a 1%  Lidocaine   was mixed with 1 cc of a 80 mg solution of Depo-Medrol injected.   The patient tolerated the knee injection well.  A Bandage   was placed over the injection site.

## 2018-01-16 NOTE — PROGRESS NOTES
Patient, Saira العراقي (MRN #[id number]), presented with a recorded BMI of 50.25 kg/m^2 consistent with the definition of morbid obesity (ICD-10 E66.01). The patient's morbid obesity was monitored, evaluated, addressed and/or treated. This addendum to the medical record is made on 01/16/2018.

## 2018-01-16 NOTE — PATIENT INSTRUCTIONS
Toxic Synovitis  Toxic synovitis is an inflammatory arthritis in the hip. It is the most common form of arthritis in children. Toxic synovitis comes on suddenly but resolves in a few days with no lasting effects. It is also called transient synovitis.  It usually occurs in children 3 to 10 years of age after a viral infection. It may be related to the bodys immune response to the virus. Many viral illnesses can trigger this response, including the common cold, stomach flu, chicken pox, mumps, rubella and other contagious diseases.  Toxic synovitis usually causes a limp and hip, thigh or knee pain. Only one hip is usually affected, although sometimes both hips are involved. There is usually no fever, redness or swelling of the joint. It is not a contagious disease.  Home care  Walking will hurt for the next few days. Your child should stay home and rest as long as there is a limp.  You may use over-the-counter medicine as directed based on age and weight for fever, fussiness or discomfort. In infants over 6 months of age, you may use a non-steroidal anti-inflammatory drug, such as ibuprofen, which may help better than acetaminophen.  If your child has chronic liver or kidney disease or ever had a stomach ulcer or gastrointestinal bleeding, talk with your doctor before using these medicines. Aspirin should never be used in anyone under 18 years of age who is ill with a fever. It may cause severe disease or death.  Follow-up care  Follow up with your healthcare provider, or as advised.  When to seek medical advice  Call your healthcare provider right away if any of these occur:  · Pain or limp that does not go away after 3 or 4  days  · Pain in other joints  · Rash  · Fever :  For a usually healthy child, call your childs healthcare provider right away if:  ¨ Your child is 3 months old or younger and has a fever of 100.4°F (38°C) or higher -- get medical care right away (fever in a young baby can be a sign of a  dangerous infection)  ¨ Your child is of any age and has repeated fevers above 104°F (40°C)  ¨ Your child is younger than 2 years of age and a fever of 100.4°F (38°C) continues for more than 1 day  ¨ Your child is 2 years old or older and a fever of 100.4°F (38°C) continues for more than 3 days  ¨ Your baby  is fussy or cries and cannot be soothed  Date Last Reviewed: 11/21/2015  © 2160-8883 [employer]. [address] [place] [address], [place] [address]. All rights reserved. This information is not intended as a substitute for professional medical care. Always follow your healthcare professional's instructions.

## 2018-02-01 DIAGNOSIS — J45.909 ASTHMA, UNSPECIFIED ASTHMA SEVERITY, UNSPECIFIED WHETHER COMPLICATED, UNSPECIFIED WHETHER PERSISTENT: Primary | ICD-10-CM

## 2018-02-02 ENCOUNTER — HOSPITAL ENCOUNTER (OUTPATIENT)
Dept: RADIOLOGY | Facility: HOSPITAL | Age: 73
Discharge: HOME | End: 2018-02-02
Attending: INTERNAL MEDICINE
Payer: MEDICARE

## 2018-02-02 ENCOUNTER — OFFICE VISIT (OUTPATIENT)
Dept: PULMONOLOGY | Facility: CLINIC | Age: 73
End: 2018-02-02
Payer: MEDICARE

## 2018-02-02 ENCOUNTER — PROCEDURE VISIT (OUTPATIENT)
Dept: PULMONOLOGY | Facility: CLINIC | Age: 73
End: 2018-02-02
Payer: MEDICARE

## 2018-02-02 VITALS
OXYGEN SATURATION: 92 % | HEIGHT: 60 IN | WEIGHT: 257.94 LBS | DIASTOLIC BLOOD PRESSURE: 72 MMHG | BODY MASS INDEX: 50.64 KG/M2 | RESPIRATION RATE: 18 BRPM | SYSTOLIC BLOOD PRESSURE: 140 MMHG | HEART RATE: 70 BPM

## 2018-02-02 DIAGNOSIS — J45.30 MILD PERSISTENT ASTHMA WITHOUT COMPLICATION: Primary | Chronic | ICD-10-CM

## 2018-02-02 DIAGNOSIS — E66.01 MORBID OBESITY WITH BMI OF 50.0-59.9, ADULT: Chronic | ICD-10-CM

## 2018-02-02 DIAGNOSIS — J45.30 MILD PERSISTENT ASTHMA WITHOUT COMPLICATION: Chronic | ICD-10-CM

## 2018-02-02 DIAGNOSIS — G47.33 OSA ON CPAP: Chronic | ICD-10-CM

## 2018-02-02 LAB
PRE FEV1 FVC: 83.28 % (ref 66.3–87.65)
PRE FEV1: 1.02 L (ref 0.89–1.95)
PRE FVC: 1.22 L (ref 1.23–2.47)
PRE PEF: 3.53 L/S (ref 2.27–5.77)

## 2018-02-02 PROCEDURE — 99215 OFFICE O/P EST HI 40 MIN: CPT | Mod: 25,S$GLB,, | Performed by: INTERNAL MEDICINE

## 2018-02-02 PROCEDURE — 71046 X-RAY EXAM CHEST 2 VIEWS: CPT | Mod: 26,,, | Performed by: RADIOLOGY

## 2018-02-02 PROCEDURE — 99999 PR PBB SHADOW E&M-EST. PATIENT-LVL III: CPT | Mod: PBBFAC,,, | Performed by: INTERNAL MEDICINE

## 2018-02-02 PROCEDURE — 1159F MED LIST DOCD IN RCRD: CPT | Mod: S$GLB,,, | Performed by: INTERNAL MEDICINE

## 2018-02-02 PROCEDURE — 3008F BODY MASS INDEX DOCD: CPT | Mod: S$GLB,,, | Performed by: INTERNAL MEDICINE

## 2018-02-02 PROCEDURE — 71046 X-RAY EXAM CHEST 2 VIEWS: CPT | Mod: TC

## 2018-02-02 PROCEDURE — 94010 BREATHING CAPACITY TEST: CPT | Mod: S$GLB,,, | Performed by: INTERNAL MEDICINE

## 2018-02-02 RX ORDER — ALBUTEROL SULFATE 90 UG/1
2 AEROSOL, METERED RESPIRATORY (INHALATION) EVERY 4 HOURS PRN
Qty: 18 G | Refills: 11 | Status: SHIPPED | OUTPATIENT
Start: 2018-02-02 | End: 2019-01-18 | Stop reason: SDUPTHER

## 2018-02-02 NOTE — ASSESSMENT & PLAN NOTE
Asthma ROS:SYMBICIRT, SINGULAIR, PROVENTIL, PROAIR. Taking medications as instructed, no medication side effects noted, no significant ongoing wheezing or shortness of breath, using bronchodilator MDI less than twice a week.   New concerns: None.   Exam: appears well, vitals normal, no respiratory distress, acyanotic, normal RR, chest clear, no wheezing, crepitations, rhonchi, normal symmetric air entry.   Assessment:  Asthma poorly controlled.   Plan: SINGULAIR, SYMBICORT,PROVENTIL, VENTOLIN. START SPIRIVA RESPIMAT. .VENTOLIN REFILLED. Orders in EMR. Reviewed use of rescue vs controlling agents, oral and inhaled meds and potential side effects.  Spirometry and CXR in 1 year.

## 2018-02-02 NOTE — ASSESSMENT & PLAN NOTE
Continue CPAP of 13. (ALONZO [facility]ITARIO)   Discussed therapeutic goals for positive airway pressure therapy(CPAP or BiPAP): Ideal is usage 100% of nights for 6 - 8 hours per night. Minimum usage is 70% of night for at least 4 hours per night used   Pateint expressed understanding. All Questions answered.    CPAP supplies renewed.

## 2018-02-02 NOTE — PROGRESS NOTES
Subjective:      Patient ID: Saira العراقي is a 72 y.o. female.    Patient Active Problem List   Diagnosis    Right knee pain    Excessive sleepiness    Asthma    JACQUELINE on CPAP    Morbid obesity with BMI of 40.0-44.9, adult    Arthritis of right shoulder region    Right shoulder tendonitis    Carpal tunnel syndrome    Right carpal tunnel syndrome    Unspecified disorder of synovium, tendon, and bursa    Acromioclavicular (joint) (ligament) sprain    Impingement syndrome of right shoulder    Rotator cuff tendinitis    Postoperative state    Morbid obesity with BMI of 50.0-59.9, adult    Arthritis of left shoulder region    Impingement syndrome of left shoulder    Mobility impaired    GINO (acute kidney injury)    Synovitis of left knee     Problem list has been reviewed.    Chief Complaint: Asthma and JACQUELINE on CPAP    HPI    Her asthma is not controlled. Spirometry reviewed with pateint who voiced understanding.  Her current respiratory therapy regimen is SINGULAIR, SYMBICORT, PROAIR and PROVENTIL.   which provides  relief. She is adherent with her regimen.  She states that she  is fine and has no specific pulmonary complaints. She denies cough sputum, hemoptysis,  pain with breathing, wheezing.   A full  review of systems, past , family  and social histories was performed except as mentioned in the note above, these are non contributory to the main issues discussed Today.     Answers for HPI/ROS submitted by the patient on 2/2/2018   Asthma  In the past 4 weeks, how much of the time did your asthma keep you from getting as much done at work, school, or at home?: some of the time  During the past 4 weeks, how often have you had shortness of breath?: more than once a day  During the past 4 weeks, how often did your asthma symptoms (Wheezing, coughing, shortness of breath, chest tightness or pain) wake you up at night or earlier that usual in the morning?: 2 or 3 nights a week  During the past 4  weeks, how often have you used your rescue inhaler or nebulizer medication (such as albuterol)?: 1 or 2 times per day  How would you rate your asthma control during the past 4 weeks?: somewhat controlled   : 11    Does she do nebulizer treatments? yes  Does she use an inhaler? yes  Does she use a spacer with MDIs? no  Does she monitor peak flow rates? no   What is her personal best peak flow rate: n/a      Current Disease Severity  frequent daytime asthma symptoms.   weekly nighttime asthma symptoms.   daily.   Number of urgent/emergent visit in last year: 2  Current limitations in activity from asthma: none.   Number of days of school or work missed in the last month: not applicable.     Asthma Classification (General Symptom Frequency):  Mild Intermittent (< 2 x wk)  Mild Persistent (> 2 x wk, < daily)  Moderate Persistent (daily; almost daily inhaler)  Severe Persistent (continual; limited activities)        She is on CPAP of 13 cms. She did not bring her data card for compliance download.  Patient denies snoring, headaches, excessive daytime sleepiness. She definitely thinks PAP is beneficial to her health and she wants to continue with PAP therapy.     [place] Sleepiness Scale   EPWORTH SLEEPINESS SCALE 2/2/2018 11/17/2015 9/17/2015 5/11/2015   Sitting and reading 1 3 3 0   Watching TV 3 3 3 2   Sitting, inactive in a public place (e.g. a theatre or a meeting) 1 0 0 0   As a passenger in a car for an hour without a break 0 2 0 0   Lying down to rest in the afternoon when circumstances permit 3 3 2 3   Sitting and talking to someone 0 0 0 0   Sitting quietly after a lunch without alcohol 2 3 2 2   In a car, while stopped for a few minutes in traffic 0 0 0 0   Total score 10 14 10 7       Immunization status reviewed and updated.    Previous Report Reviewed: office notes     The following portions of the patient's history were reviewed and updated as appropriate: She  has a past medical history of Arthritis;  Asthma; COPD (chronic obstructive pulmonary disease); Diabetes mellitus; Encounter for blood transfusion; Hypertension; Lung disease; and Sleep apnea.  She  has a past surgical history that includes Knee Arthroplasty; shoulder; Cyst Removal; Appendectomy; Hysterectomy; Cholecystectomy; Cardiac surgery; Hernia repair; and Cataract extraction w/ intraocular lens  implant, bilateral.  Her family history includes Cancer in her father and mother; Diabetes in her mother; Heart failure in her maternal grandfather and son; Hypertension in her father and mother.  She  reports that she quit smoking about 4 years ago. She has a 50.00 pack-year smoking history. She has never used smokeless tobacco. She reports that she does not drink alcohol or use drugs.  She has a current medication list which includes the following prescription(s): acetaminophen-codeine 300-30mg, albuterol, albuterol, alcohol prep pads, alendronate, amlodipine, ammonium lactate, aspirin, atorvastatin, azelastine, benicar, budesonide-formoterol 160-4.5 mcg, clever choice blood gluc sys, doxazosin, embrace blood glucose system, furosemide, gabapentin, hydrocodone-acetaminophen 10-325mg, ilevro, lancing device with lancets, lidocaine-prilocaine, lite touch lancets, meloxicam, metformin, metolazone, metoprolol tartrate, montelukast, nabumetone, ofloxacin, omega-3 fatty acids, potassium chloride sa, prednisolone acetate, ranitidine, vitamin e, diclofenac sodium, and tiotropium bromide.  She is allergic to ace inhibitors and ultram [tramadol]..    Review of Systems   Constitutional: Negative for fever, chills and fatigue.   HENT: Positive for rhinorrhea, sinus pressure and congestion. Negative for postnasal drip.    Eyes: Positive for itching.   Respiratory: Positive for dyspnea on extertion. Negative for cough, hemoptysis, sputum production, shortness of breath and wheezing.    Cardiovascular: Positive for leg swelling. Negative for chest pain.   Genitourinary:  Negative for difficulty urinating and hematuria.   Musculoskeletal: Positive for arthralgias and back pain.   Skin: Negative for rash.   Gastrointestinal: Negative for nausea, vomiting and abdominal pain.   Neurological: Negative for dizziness, syncope, light-headedness and headaches.   Hematological: Bleeds easily.   Psychiatric/Behavioral: The patient is not nervous/anxious.       Objective:     BP (!) 140/72   Pulse 70   Resp 18   Ht 5' (1.524 m)   Wt 117 kg (257 lb 15 oz)   SpO2 (!) 92%   BMI 50.38 kg/m²   Body mass index is 50.38 kg/m².    Physical Exam   Constitutional: She is oriented to person, place, and time. She appears well-developed and well-nourished.   Obese   HENT:   Head: Normocephalic and atraumatic.   Neck: Normal range of motion. Neck supple.   Cardiovascular: Normal rate and regular rhythm.  Exam reveals no gallop.    No murmur heard.  Pulmonary/Chest: Effort normal and breath sounds normal.   Abdominal: Soft. Bowel sounds are normal.   Musculoskeletal: Normal range of motion. She exhibits edema.   Neurological: She is alert and oriented to person, place, and time.   Skin: Skin is warm and dry.   Psychiatric: She has a normal mood and affect.       Personal Diagnostic Review    Pulmonary function tests: FEV1: 1.02 (72 % predicted), FVC: 1.22 (66% predicted), FEV1/FVC:  83%:  Normal airflow. Moderate restriction.      CXR: Persistent linear stranding densities noted within the mid to lower lung zones bilaterally including what appears to represent some minimal pleural thickening versus fluid within the minor fissure.  There is stable blunting of the right costophrenic angle.  The heart is enlarged.  There is evidence for prior median sternotomy.Minimal prominence of the central ray vasculature again noted.    Assessment / Plan:     Discussed diagnosis, its evaluation, treatment and usual course. All questions answered.    Problem List Items Addressed This Visit        Pulmonary    Asthma -  Primary (Chronic)    Current Assessment & Plan     Asthma ROS:SYMBICIRT, SINGULAIR, PROVENTIL, PROAIR. Taking medications as instructed, no medication side effects noted, no significant ongoing wheezing or shortness of breath, using bronchodilator MDI less than twice a week.   New concerns: None.   Exam: appears well, vitals normal, no respiratory distress, acyanotic, normal RR, chest clear, no wheezing, crepitations, rhonchi, normal symmetric air entry.   Assessment:  Asthma poorly controlled.   Plan: SINGULAIR, SYMBICORT,PROVENTIL, VENTOLIN. START SPIRIVA RESPIMAT. .VENTOLIN REFILLED. Orders in EMR. Reviewed use of rescue vs controlling agents, oral and inhaled meds and potential side effects.  Spirometry and CXR in 1 year.          Relevant Medications    tiotropium bromide (SPIRIVA RESPIMAT) 2.5 mcg/actuation Mist    albuterol 90 mcg/actuation inhaler    Other Relevant Orders    Complete PFT with bronchodilator    X-Ray Chest PA And Lateral       Endocrine    Morbid obesity with BMI of 50.0-59.9, adult (Chronic)    Current Assessment & Plan     General weight loss/lifestyle modification strategies discussed (elicit support from others; identify saboteurs; non-food rewards, etc).  Diet interventions: low calorie (1000 kCal/d) deficit diet.            Other    JACQUELINE on CPAP (Chronic)    Current Assessment & Plan     Continue CPAP of 13. (ALONZO SOLITARIO)   Discussed therapeutic goals for positive airway pressure therapy(CPAP or BiPAP): Ideal is usage 100% of nights for 6 - 8 hours per night. Minimum usage is 70% of night for at least 4 hours per night used   Pateint expressed understanding. All Questions answered.    CPAP supplies renewed.          Relevant Orders    CPAP/BIPAP SUPPLIES          TIME SPENT WITH PATIENT: Time spent: 40 minutes in face to face  discussion concerning diagnosis, prognosis, review of lab and test results, benefits of treatment as well as management of disease, counseling of patient and  coordination of care between various health  care providers . Greater than half the time spent was used for coordination of care and counseling of patient.     Follow-up in about 1 year (around 2/2/2019) for JACQUELINE, Morbid Obesity, Asthma.

## 2018-02-02 NOTE — PATIENT INSTRUCTIONS
Tiotropium respiratory inhalation spray (Spiriva Respimat)  What is this medicine?  TIOTROPIUM (sarah oh TRO pee um) is a bronchodilator. It helps open up the airways in your lungs to make it easier to breathe. This medicine is used to treat chronic obstructive pulmonary disease (COPD), including emphysema and chronic bronchitis. It is also used to treat asthma. Do not use this medicine for an acute attack.  How should I use this medicine?  This medicine is inhaled through the mouth. It is used once per day. Follow the directions on the prescription label. Take your medicine at regular intervals. Do not take your medicine more often than directed. Do not stop taking except on your doctor's advice. Make sure that you are using your inhaler correctly. Ask you doctor or health care provider if you have any questions.  Talk to your pediatrician regarding the use of this medicine in children. While this drug may be prescribed for children as young as 12 years for selected conditions, precautions do apply.  What side effects may I notice from receiving this medicine?  Side effects that you should report to your doctor or health care professional as soon as possible:  · allergic reactions like skin rash or hives, swelling of the face, lips, or tongue  · breathing problems right after inhaling your medicine  · changes in vision  · chest pain  · difficulty breathing or wheezing that increases or does not go away  · fast, irregular heartbeat  · general ill feeling or flu-like symptoms  · stomach pain  · trouble passing urine or change in the amount of urine  Side effects that usually do not require medical attention (Report these to your doctor or health care professional if they continue or are bothersome.):  · constipation  · cough  · dizziness  · dry mouth  · sore throat  What may interact with this medicine?  This medicine may also interact with the following medications:  · atropine  · antihistamines for allergy, cough  and cold  · certain medicines for bladder problems like oxybutynin, tolterodine  · certain medicines for stomach problems like dicyclomine, hyoscyamine  · certain medicines for travel sickness like scopolamine  · certain medicines for Parkinson's disease like benztropine, trihexyphenidyl  · ipratropium  What if I miss a dose?  If you miss a dose, use it as soon as you can. If it is almost time for your next dose, use only that dose and continue with your regular schedule. Do not use double or extra doses.  Where should I keep my medicine?  Keep out of the reach of children.  Store at a room temperature between 15 and 30 degrees C (59 and 86 degrees F). Do not freeze. The inhaler should be discarded after 3 months or when the inhaler becomes locked, whichever comes first. Throw away any unopened packages after the expiration date.  What should I tell my health care provider before I take this medicine?  They need to know if you have any of these conditions:  · bladder problems or difficulty passing urine  · glaucoma  · kidney disease  · prostate disease  · an unusual or allergic reaction to tiotropium, ipratropium, atropine, other medicines, foods, dyes, or preservatives  · pregnant or trying to get pregnant  · breast-feeding  What should I watch for while using this medicine?  Visit your doctor or health care professional for regular checks on your progress. Tell your doctor if your symptoms do not improve. Do not use more medicine than directed. If your symptoms get worse while you are using this medicine, call your doctor right away.  Do not get the this medicine in your eyes. It can cause irritation, pain, or blurred vision.  You may get dizzy. Do not drive, use machinery, or do anything that needs mental alertness until you know how this medicine affects you. Do not stand or sit up quickly, especially if you are an older patient. This reduces the risk of dizzy or fainting spells.  Clean the inhaler as directed  in the patient information sheet that comes with this medicine.  NOTE:This sheet is a summary. It may not cover all possible information. If you have questions about this medicine, talk to your doctor, pharmacist, or health care provider. Copyright© 2017 [employer]

## 2018-02-05 ENCOUNTER — TELEPHONE (OUTPATIENT)
Dept: PULMONOLOGY | Facility: CLINIC | Age: 73
End: 2018-02-05

## 2018-02-05 NOTE — TELEPHONE ENCOUNTER
----- Message from Ayala Deng sent at 2/5/2018  8:47 AM CST -----  Pt needs to speak to the nurse regarding the med spirva being too expensive/please call [phone]

## 2018-03-06 ENCOUNTER — TELEPHONE (OUTPATIENT)
Dept: PULMONOLOGY | Facility: CLINIC | Age: 73
End: 2018-03-06

## 2018-03-06 NOTE — TELEPHONE ENCOUNTER
----- Message from Marybeth Unger sent at 3/6/2018  8:18 AM CST -----  Contact: self [phone]  Would like to consult with nurse regarding renewing nebulizer.  Please call back at [phone].   Md Lit

## 2018-03-09 DIAGNOSIS — J45.30 MILD PERSISTENT ASTHMA WITHOUT COMPLICATION: ICD-10-CM

## 2018-03-09 RX ORDER — ALBUTEROL SULFATE 0.63 MG/3ML
SOLUTION RESPIRATORY (INHALATION)
Qty: 375 ML | Refills: 11 | Status: SHIPPED | OUTPATIENT
Start: 2018-03-09 | End: 2019-01-18 | Stop reason: SDUPTHER

## 2018-04-10 RX ORDER — FUROSEMIDE 40 MG/1
TABLET ORAL
Qty: 60 TABLET | Refills: 0 | Status: SHIPPED | OUTPATIENT
Start: 2018-04-10

## 2018-06-28 ENCOUNTER — TELEPHONE (OUTPATIENT)
Dept: ORTHOPEDICS | Facility: CLINIC | Age: 73
End: 2018-06-28

## 2018-06-28 NOTE — TELEPHONE ENCOUNTER
Called pt to reschedule 7/17 appointment due to provider being out of clinic. Pt rescheduled to 7/13 at 10a. Pt confirmed appointment

## 2018-07-09 ENCOUNTER — TELEPHONE (OUTPATIENT)
Dept: ORTHOPEDICS | Facility: CLINIC | Age: 73
End: 2018-07-09

## 2018-07-09 NOTE — TELEPHONE ENCOUNTER
Spoke with the patient about her appointment that she has on 7/13/18 with Dr. Teixeira. Had to reschedule her appointment due to the fact that Dr. Teixeira will be in surgery on that day. I got the patient rescheduled to see Dr. Teixeira on 7/26/18.FP

## 2018-07-26 ENCOUNTER — OFFICE VISIT (OUTPATIENT)
Dept: ORTHOPEDICS | Facility: CLINIC | Age: 73
End: 2018-07-26
Payer: MEDICARE

## 2018-07-26 ENCOUNTER — HOSPITAL ENCOUNTER (OUTPATIENT)
Dept: RADIOLOGY | Facility: HOSPITAL | Age: 73
Discharge: HOME | End: 2018-07-26
Attending: ORTHOPAEDIC SURGERY
Payer: MEDICARE

## 2018-07-26 VITALS
SYSTOLIC BLOOD PRESSURE: 154 MMHG | HEIGHT: 60 IN | WEIGHT: 257 LBS | BODY MASS INDEX: 50.45 KG/M2 | HEART RATE: 72 BPM | DIASTOLIC BLOOD PRESSURE: 75 MMHG

## 2018-07-26 DIAGNOSIS — M25.512 LEFT SHOULDER PAIN, UNSPECIFIED CHRONICITY: Primary | ICD-10-CM

## 2018-07-26 DIAGNOSIS — M25.512 LEFT SHOULDER PAIN, UNSPECIFIED CHRONICITY: ICD-10-CM

## 2018-07-26 DIAGNOSIS — R20.2 NUMBNESS AND TINGLING: ICD-10-CM

## 2018-07-26 DIAGNOSIS — M25.562 LEFT KNEE PAIN, UNSPECIFIED CHRONICITY: ICD-10-CM

## 2018-07-26 DIAGNOSIS — M65.9 SYNOVITIS OF LEFT KNEE: ICD-10-CM

## 2018-07-26 DIAGNOSIS — R20.0 NUMBNESS AND TINGLING: ICD-10-CM

## 2018-07-26 PROCEDURE — 73030 X-RAY EXAM OF SHOULDER: CPT | Mod: TC,FY,PO,LT

## 2018-07-26 PROCEDURE — 73562 X-RAY EXAM OF KNEE 3: CPT | Mod: 26,LT,, | Performed by: RADIOLOGY

## 2018-07-26 PROCEDURE — 99214 OFFICE O/P EST MOD 30 MIN: CPT | Mod: S$GLB,,, | Performed by: ORTHOPAEDIC SURGERY

## 2018-07-26 PROCEDURE — 3077F SYST BP >= 140 MM HG: CPT | Mod: CPTII,S$GLB,, | Performed by: ORTHOPAEDIC SURGERY

## 2018-07-26 PROCEDURE — 73030 X-RAY EXAM OF SHOULDER: CPT | Mod: 26,LT,, | Performed by: RADIOLOGY

## 2018-07-26 PROCEDURE — 99999 PR PBB SHADOW E&M-EST. PATIENT-LVL III: CPT | Mod: PBBFAC,,, | Performed by: ORTHOPAEDIC SURGERY

## 2018-07-26 PROCEDURE — 3078F DIAST BP <80 MM HG: CPT | Mod: CPTII,S$GLB,, | Performed by: ORTHOPAEDIC SURGERY

## 2018-07-26 PROCEDURE — 73560 X-RAY EXAM OF KNEE 1 OR 2: CPT | Mod: 26,XS,RT, | Performed by: RADIOLOGY

## 2018-07-26 PROCEDURE — 73562 X-RAY EXAM OF KNEE 3: CPT | Mod: TC,FY,PO,LT

## 2018-07-26 PROCEDURE — 73560 X-RAY EXAM OF KNEE 1 OR 2: CPT | Mod: TC,FY,PO,RT

## 2018-07-26 RX ORDER — NABUMETONE 500 MG/1
500 TABLET, FILM COATED ORAL DAILY
Qty: 30 TABLET | Refills: 2 | Status: SHIPPED | OUTPATIENT
Start: 2018-07-26 | End: 2020-02-19 | Stop reason: SDUPTHER

## 2018-07-27 PROBLEM — R20.0 NUMBNESS AND TINGLING: Status: ACTIVE | Noted: 2018-07-27

## 2018-07-27 PROBLEM — M25.512 LEFT SHOULDER PAIN: Status: ACTIVE | Noted: 2018-07-27

## 2018-07-27 PROBLEM — R20.2 NUMBNESS AND TINGLING: Status: ACTIVE | Noted: 2018-07-27

## 2018-07-27 PROBLEM — M25.562 LEFT KNEE PAIN: Status: ACTIVE | Noted: 2018-07-27

## 2018-07-27 NOTE — PATIENT INSTRUCTIONS
ACE Wrap  Minor muscle or joint injuries are often treated with an elastic bandage. The bandage provides support and compression to the injured area. An elastic bandage is a stretchy, rolled bandage. Elastic bandages range in width from 2 to 6 inches. They can be used for a variety of injuries. The bandages are often called ACE bandages, after the most common brand name.  If used correctly, elastic bandages help control swelling and ease pain. An elastic bandage is also a good reminder not to overuse the injured area. However, elastic bandages do not provide a lot of support and will not prevent reinjury.  Home care    To apply an elastic bandage:  · Check the skin before wrapping the injury. It should be clean, dry, and free of drainage.  · Start wrapping below the injury and work your way toward the body. For an ankle sprain, start wrapping around the foot and work up toward the calf. This will help control swelling.  · Overlap the edges of the bandage so it stays snuggly in place.  · Wrap the bandage firmly, but not too tightly. A tight bandage can increase swelling on either end of the bandage. Make sure the bandage is wrinkle free.  · Leave fingers and toes exposed.  · Secure ends of the bandage (even self-sticking ones) with clips or tape.  · Check frequently to ensure adequate circulation, especially in the fingers and toes. Loosen the bandage if there is local swelling, numbness, tingling, discomfort, coldness, or discoloration (skin pale or bluish in color).  · Rewrap the bandage as needed during the day. Reroll the bandage as you unwind it.  Continue using the elastic bandage until the pain and swelling are gone or as your healthcare provider advises.  If you have been told to ice the area, the ice can be secured in place with the elastic bandage. Wrap the ice pack with a thin towel to protect the skin. Do not put ice or an ice pack directly on the skin.  Ice the area for no more than 20 minutes at a  time.    Follow-up care  Follow up with your healthcare provider, as advised.  When to seek medical advice  Call your healthcare provider for any of the following:  · Pain and swelling that doesn't get better or gets worse  · Trouble moving injured area  · Skin discoloration, numbness, or tingling that doesnt go away after bandage is removed  Date Last Reviewed: 9/13/2015  © 9997-3996 [employer]. [address] [place] [address], [place] [id number]. All rights reserved. This information is not intended as a substitute for professional medical care. Always follow your healthcare professional's instructions.        ACE Wrap  Minor muscle or joint injuries are often treated with an elastic bandage. The bandage provides support and compression to the injured area. An elastic bandage is a stretchy, rolled bandage. Elastic bandages range in width from 2 to 6 inches. They can be used for a variety of injuries. The bandages are often called ACE bandages, after the most common brand name.  If used correctly, elastic bandages help control swelling and ease pain. An elastic bandage is also a good reminder not to overuse the injured area. However, elastic bandages do not provide a lot of support and will not prevent reinjury.  Home care    To apply an elastic bandage:  · Check the skin before wrapping the injury. It should be clean, dry, and free of drainage.  · Start wrapping below the injury and work your way toward the body. For an ankle sprain, start wrapping around the foot and work up toward the calf. This will help control swelling.  · Overlap the edges of the bandage so it stays snuggly in place.  · Wrap the bandage firmly, but not too tightly. A tight bandage can increase swelling on either end of the bandage. Make sure the bandage is wrinkle free.  · Leave fingers and toes exposed.  · Secure ends of the bandage (even self-sticking ones) with clips or tape.  · Check frequently to ensure adequate circulation,  especially in the fingers and toes. Loosen the bandage if there is local swelling, numbness, tingling, discomfort, coldness, or discoloration (skin pale or bluish in color).  · Rewrap the bandage as needed during the day. Reroll the bandage as you unwind it.  Continue using the elastic bandage until the pain and swelling are gone or as your healthcare provider advises.  If you have been told to ice the area, the ice can be secured in place with the elastic bandage. Wrap the ice pack with a thin towel to protect the skin. Do not put ice or an ice pack directly on the skin.  Ice the area for no more than 20 minutes at a time.    Follow-up care  Follow up with your healthcare provider, as advised.  When to seek medical advice  Call your healthcare provider for any of the following:  · Pain and swelling that doesn't get better or gets worse  · Trouble moving injured area  · Skin discoloration, numbness, or tingling that doesnt go away after bandage is removed  Date Last Reviewed: 9/13/2015 © 2000-2017 [employer]. [address] [place] [id number]. All rights reserved. This information is not intended as a substitute for professional medical care. Always follow your healthcare professional's instructions.

## 2018-07-27 NOTE — PROGRESS NOTES
SUBJECTIVE:  Patient is status post left total knee 2009/2010.  This patient complains of numbness and tingling in the hand and wrist.    Patient is continue the wrist immobilizer, anti-inflammatories as well as vitamin B.  Patient states that her wrist symptoms have been present for greater than 6 months and has not responded to conservative treatment.      Was seen several weeks ago for right shoulder pain, received steroid injection, and now feels 100% better on the shoulder.     HPI: under went the above surgery, was better for a while. Over past few weeks ROM is decreasing, pain increasing on the medial side of the knee.  She's noticed that she change her diet to last week eating last wheat and gluten and the symptoms are improving.         Past Medical History:   Diagnosis Date    Arthritis     Asthma     COPD (chronic obstructive pulmonary disease)     Diabetes mellitus     Encounter for blood transfusion     Hypertension     Lung disease     Sleep apnea      Past Surgical History:   Procedure Laterality Date    APPENDECTOMY      CARDIAC SURGERY      valve replacement 12/2014    CATARACT EXTRACTION W/ INTRAOCULAR LENS  IMPLANT, BILATERAL      CHOLECYSTECTOMY      CYST REMOVAL      HERNIA REPAIR      left, 3/2015    HYSTERECTOMY      KNEE ARTHROPLASTY      shoulder       Review of patient's allergies indicates:   Allergen Reactions    Ace inhibitors Anaphylaxis    Ultram [tramadol] Anaphylaxis     Current Outpatient Prescriptions on File Prior to Visit   Medication Sig Dispense Refill    albuterol (ACCUNEB) 0.63 mg/3 mL Nebu INHALE 1 VIAL VIA NEBULIZER EVERY 6 HOURS AS NEEDED 375 mL 11    albuterol 90 mcg/actuation inhaler Inhale 2 puffs into the lungs every 4 (four) hours as needed for Wheezing. 18 g 11    ALCOHOL PREP PADS PadM       alendronate (FOSAMAX) 70 MG tablet TAKE 1 TABLET ONCE A WEEK ORALLY 90 DAYS  1    amlodipine (NORVASC) 10 MG tablet Take 10 mg by mouth once daily.        ammonium lactate 12 % Crea Apply 1 Applicatorful topically.      aspirin (ECOTRIN) 81 MG EC tablet Take 81 mg by mouth once daily.      atorvastatin (LIPITOR) 40 MG tablet Take 40 mg by mouth once daily.   1    BENICAR 40 mg tablet Take 40 mg by mouth once daily.       budesonide-formoterol 160-4.5 mcg (SYMBICORT) 160-4.5 mcg/actuation HFAA INHALE 2 PUFFS INTO THE LUNGS EVERY 12 (TWELVE) HOURS. 10.2 Inhaler 11    CLEVER CHOICE BLOOD GLUC SYS [facility]       EMBRACE BLOOD GLUCOSE SYSTEM Str[facility]       furosemide (LASIX) 40 MG tablet TAKE 1 TABLET BY MOUTH TWICE A DAY 60 tablet 0    gabapentin (NEURONTIN) 300 MG capsule Take 300 mg by mouth 2 (two) times daily.       hydrocodone-acetaminophen 10-325mg (NORCO)  mg Tab Take 1 tablet by mouth every 6 (six) hours as needed.      LANCING DEVICE WITH LANCETS Misc       lidocaine-prilocaine (EMLA) cream       LITE TOUCH LANCETS 30 gauge Misc       metformin (GLUCOPHAGE) 500 MG tablet Take 500 mg by mouth 2 (two) times daily with meals.      metolazone (ZAROXOLYN) 2.5 MG tablet Take 1 tablet by mouth once daily.      metoprolol tartrate (LOPRESSOR) 50 MG tablet Take 1 tablet by mouth 2 (two) times daily.      montelukast (SINGULAIR) 10 mg tablet TAKE 1 TABLET (10 MG TOTAL) BY MOUTH EVERY EVENING. 30 tablet 11    potassium chloride SA (K-DUR,KLOR-CON) 20 MEQ tablet Take 1 tablet by mouth once daily.      ranitidine (ZANTAC) 150 MG tablet Take 150 mg by mouth.      tiotropium bromide (SPIRIVA RESPIMAT) 2.5 mcg/actuation Mist Inhale 2 puffs into the lungs once daily. Controller 4 g 11    vitamin E 1000 UNIT capsule Take 1,000 Units by mouth once daily.      diclofenac sodium 1 % Gel Apply 2 g topically 4 (four) times daily. 1 Tube 2    doxazosin (CARDURA) 2 MG tablet Take 2 mg by mouth once daily.        No current facility-administered medications on file prior to visit.      BP (!) 154/75 (BP Location: Left arm, Patient Position: Sitting, BP Method:  Medium (Automatic))   Pulse 72   Ht 5' (1.524 m)   Wt 116.6 kg (257 lb)   BMI 50.19 kg/m²    ROS:  GENERAL: No fever, chills, fatigability or weight loss.  MUSCULOSKELETAL: See HPI    PE:  APPEARANCE: Well nourished, well developed, in no acute distress.   NEUROLOGIC: Cranial Nerves: II-XII grossly intact, also see MUSCULOSKELETAL  MUSCULOSKELETAL:     Left  Shoulder-tenderness over the rotator cuff insertion, positive impingement, fair range of motion. Neurovascularly intact.      Left wrist/hand-      - Positive- Tinel's over the Median nerve  Positive- Phalen maneuver   Positive- Thenar atrophy   Negative- hypothenar atrophy   Positive- Median Nerve Compression test less than 30 seconds   Negative- Tinel's over Guyon's Canal   Negative- Tinel's over Cubital Tunnel Negative- Tinels over the Median Nerve overlying the antecubital  Fossa   Negative- Tinel's over Radial groove  Negative- Tinel's over sensory branch of Radial nerve at the wrist  Negative- Tinel's over the PIN   Negative- pain in forearm with resistance to             FDP flexion and resisted pronation   Positive- boggy synovium of the wrist   normal- less than 2 seconds capillary all digits  Positive- light touch intact in Median nerve distribution Positive- light touch in the Radial Nerve distribution  Positive- light touch intact in the Ulnar sensory nerve distribution    Positive- Thumb opposition strength symmetrical  Negative- bruit(aneurysm)    Positive- skin color intact(claudication/Raynaud's)  Negative- cold digits(claudication/Raynaud's)  normal - Morro Test(Vascular Exam)  normal- +2 Radial and Ulnar artery pulses  Negative- anatomical snuff box tenderness(Dequervain's Synovitis)  Negative- finger or thumb triggering(Trigger Thumb or Finger)  Negative- Lipoma  Negative- Ganglion cyst      Sensory exam-C5,C6,C7,C8,T1- normal    Wrist extension for radial nerve- +5/5  Wrist Flexion-+5/5  Wrist Ulnar Deviation-+5/5  Wrist Radial Deviation-  +5/5  Resisted opposition of the thumb for the median nerve- +5/5  Digit abduction for the ulnar nerve- +5/5    Left knee- 2 plus radial  artery and ulnar artery pulses, light touch intact left lower extremity.  All digits are warm. No erythema, no warmth, no drainage, No swelling, mild tenderness along the medial lateral ligament area  Diagnosis:     1.  Left knee synovitis               2.  Left Carpal tunnel syndrome               3.  Left Shoulder pain.    PLAN:    Follow up in 3 weeks   EMG nerve conduction study of the upper extremity.  Patient may benefit from an MRI of the left shoulder.

## 2018-08-01 ENCOUNTER — TELEPHONE (OUTPATIENT)
Dept: PULMONOLOGY | Facility: CLINIC | Age: 73
End: 2018-08-01

## 2018-08-01 DIAGNOSIS — J45.30 MILD PERSISTENT ASTHMA WITHOUT COMPLICATION: Primary | Chronic | ICD-10-CM

## 2018-08-01 NOTE — TELEPHONE ENCOUNTER
----- Message from Kiera Brito sent at 8/1/2018  3:40 PM CDT -----  Contact: [facility]([place])[phone]  Would like to consult with nurse regarding a new order a C-PAP machine, please call back at [phone]. Thanks/ar

## 2018-08-06 NOTE — TELEPHONE ENCOUNTER
----- Message from Vera Her sent at 3/20/2017 10:49 AM CDT -----  Would like to speak to nurse about paperwork. Please call back at [phone]. Thanks//cdb   No

## 2018-08-08 DIAGNOSIS — J45.30 MILD PERSISTENT ASTHMA WITHOUT COMPLICATION: Chronic | ICD-10-CM

## 2018-08-08 RX ORDER — BUDESONIDE AND FORMOTEROL FUMARATE DIHYDRATE 160; 4.5 UG/1; UG/1
AEROSOL RESPIRATORY (INHALATION)
Qty: 10.2 INHALER | Refills: 9 | Status: SHIPPED | OUTPATIENT
Start: 2018-08-08 | End: 2019-01-18

## 2018-08-21 ENCOUNTER — TELEPHONE (OUTPATIENT)
Dept: RADIOLOGY | Facility: HOSPITAL | Age: 73
End: 2018-08-21

## 2018-08-22 ENCOUNTER — HOSPITAL ENCOUNTER (OUTPATIENT)
Dept: RADIOLOGY | Facility: HOSPITAL | Age: 73
Discharge: HOME | End: 2018-08-22
Attending: ORTHOPAEDIC SURGERY
Payer: MEDICARE

## 2018-08-22 ENCOUNTER — OFFICE VISIT (OUTPATIENT)
Dept: PHYSICAL MEDICINE AND REHAB | Facility: CLINIC | Age: 73
End: 2018-08-22
Payer: MEDICARE

## 2018-08-22 VITALS
WEIGHT: 257 LBS | BODY MASS INDEX: 50.45 KG/M2 | HEART RATE: 69 BPM | RESPIRATION RATE: 16 BRPM | DIASTOLIC BLOOD PRESSURE: 78 MMHG | SYSTOLIC BLOOD PRESSURE: 150 MMHG | HEIGHT: 60 IN

## 2018-08-22 DIAGNOSIS — G56.03 BILATERAL CARPAL TUNNEL SYNDROME: Primary | ICD-10-CM

## 2018-08-22 DIAGNOSIS — M25.512 LEFT SHOULDER PAIN, UNSPECIFIED CHRONICITY: ICD-10-CM

## 2018-08-22 PROCEDURE — 99204 OFFICE O/P NEW MOD 45 MIN: CPT | Mod: 25,S$GLB,, | Performed by: PHYSICAL MEDICINE & REHABILITATION

## 2018-08-22 PROCEDURE — 3077F SYST BP >= 140 MM HG: CPT | Mod: CPTII,S$GLB,, | Performed by: PHYSICAL MEDICINE & REHABILITATION

## 2018-08-22 PROCEDURE — 73221 MRI JOINT UPR EXTREM W/O DYE: CPT | Mod: 26,LT,, | Performed by: RADIOLOGY

## 2018-08-22 PROCEDURE — 95910 NRV CNDJ TEST 7-8 STUDIES: CPT | Mod: S$GLB,,, | Performed by: PHYSICAL MEDICINE & REHABILITATION

## 2018-08-22 PROCEDURE — 3078F DIAST BP <80 MM HG: CPT | Mod: CPTII,S$GLB,, | Performed by: PHYSICAL MEDICINE & REHABILITATION

## 2018-08-22 PROCEDURE — 73221 MRI JOINT UPR EXTREM W/O DYE: CPT | Mod: TC,PO,LT

## 2018-08-22 PROCEDURE — 99999 PR PBB SHADOW E&M-EST. PATIENT-LVL III: CPT | Mod: PBBFAC,,, | Performed by: PHYSICAL MEDICINE & REHABILITATION

## 2018-08-22 NOTE — LETTER
August 22, 2018      Richie Teixeira Sr., MD  [address] [place] [address]           [facility] - Physiatry  [address] [place] [address]  Phone: [phone]  Fax: [phone]          Patient: Saira العراقي   MR Number: [id number]   YOB: 1945   Date of Visit: 8/22/2018       Dear Dr. Richie Teixeira Sr.:    Thank you for referring Saira العراقي to me for evaluation. Attached you will find relevant portions of my assessment and plan of care.    If you have questions, please do not hesitate to call me. I look forward to following Saira العراقي along with you.    Sincerely,    Luna Jay MD    Enclosure  CC:  No Recipients    If you would like to receive this communication electronically, please contact [email or web address] or [phone] to request more information on [facility] Link access.    For providers and/or their staff who would like to refer a patient to [facility], please contact us through our one-stop-shop provider referral line, [facility], at [phone].    If you feel you have received this communication in error or would no longer like to receive these types of communications, please e-mail [email or web address]

## 2018-08-22 NOTE — PATIENT INSTRUCTIONS
Carpal Tunnel Syndrome    Carpal tunnel syndrome is a painful condition of the wrist and arm. It is caused by pressure on the median nerve.  The median nerve is one of the nerves that give feeling and movement to the hand. It passes through a tunnel in the wrist called the carpal tunnel. This tunnel is made up of bones and ligaments. Narrowing of this tunnel or swelling of the tissues inside the tunnel puts pressure on the median nerve. This causes numbness, pins and needles, or electric shooting pains in your hand and forearm. Often the pain is worse at night and may wake you when you are asleep.  Carpal tunnel syndrome may occur during pregnancy and with use of birth control pills. It is more common in workers who must often bend their wrists. It is also common in people who work with power tools that cause strong vibrations.  Home care  · Rest the painful wrist. Avoid repeated bending of the wrist back and forth. This puts pressure on the median nerve. Avoid using power tools with strong vibrations.  · If you were given a splint, wear it at night while you sleep. You may also wear it during the day for comfort.  · Move your fingers and wrists often to avoid stiffness.  · Elevate your arms on pillows when you lie down.  · Try using the unaffected hand more.  · Try not to hold your wrists in a bent, downward position.  · Sometimes changes in the work place may ease symptoms. If you type most of the day, it may help to change the position of your keyboard or add a wrist support. Your wrist should be in a neutral position and not bent back when typing.  · You may use over-the-counter pain medicine to treat pain and inflammation, unless another medicine was prescribed. Anti-inflammatory pain medicines, such as ibuprofen or naproxen may be more effective than acetaminophen, which treats pain, but not inflammation. If you have chronic liver or kidney disease or ever had a stomach ulcer or GI bleeding, talk with your  doctor before using these medicines.  · Opioid pain medicine will only give temporary relief and does not treat the problem. If pain continues, you may need a shot of a steroid drug into your wrist.  · If the above methods fail, you may need surgery. This will open the carpal tunnel and release the pressure on the trapped nerve.  Follow-up care  Follow up with your healthcare provider, or as advised, if the pain doesnt begin to improve within the next week.  If X-rays were taken, you will be notified of any new findings that may affect your care.  When to seek medical advice  Call your healthcare provider right away if any of these occur:  · Pain not improving with the above treatment  · Fingers or hand become cold, blue, numb, or tingly  · Your whole arm becomes swollen or weak  Date Last Reviewed: 11/23/2015  © 1663-5908 [employer]. [address] [place] [address]. All rights reserved. This information is not intended as a substitute for professional medical care. Always follow your healthcare professional's instructions.        Carpal Tunnel Syndrome Prevention Tips  Some repetitive hand activities put you at higher risk for carpal tunnel syndrome (CTS). But you can reduce your risk. Learn how to change the way you use your hands. Below are tips for at home and on the job. Be sure to also follow the hand and wrist safety policies at your workplace.      Keep your wrist in a neutral (straight) position when exercising.      Keep your wrist in neutral  Keep a neutral (straight) wrist position as often as you can. Dont use your wrist in a bent (flexed) position for long periods of time. This includes extended or twisted positions.  Watch your   Dont just use your thumb and index finger to grasp or lift. This can put stress on your wrist. When you can, use your whole hand and all its fingers to grasp an object.  Minimize repetition  Dont move your arms or hands or hold an object in  the same way for long periods of time. Even simple, light tasks can cause injury this way. Instead, alternate tasks or switch hands.  Rest your hands  Give your hands a break from time to time with a rest. Even a few minutes once an hour can help.  Reduce speed and force  Slow down the speed in which you do a forceful, repetitive motion. This gives your wrist time to recover from the effort. Use power tools to help reduce the force.  Strengthen the muscles  Weak muscles may lead to a poor wrist or arm position. Exercises will make your hand and arm muscles stronger. This can help you keep a better position.  Date Last Reviewed: 9/11/2015 © 2000-2017 [employer] [address] [place] [address], [place] [address]. All rights reserved. This information is not intended as a substitute for professional medical care. Always follow your healthcare professional's instructions.        Understanding Carpal Tunnel Syndrome    The carpal tunnel is a narrow space inside the wrist. It is ringed by bone and a band of tough tissue called the transverse carpal ligament. A major nerve called the median nerve runs from the forearm into the hand through the carpal tunnel. Tendons also run through the carpal tunnel.  With carpal tunnel syndrome, the tendons or nearby tissues within the carpal tunnel may swell or thicken. Or the transverse carpal ligament may harden and shorten. This narrows the space in the carpal tunnel and puts pressure on the median nerve. This pressure leads to tingling and numbness of the hand and wrist. In time, the condition can make even simple tasks hard to do.  What causes carpal tunnel syndrome?  Doctors arent entirely clear why the condition occurs. Certain things may make a person more likely to have it. These include:  · Being female  · Being pregnant  · Being overweight  · Having diabetes or rheumatoid arthritis  Symptoms of carpal tunnel syndrome  Symptoms often come and go. At first, symptoms  may occur mainly at night. Later, they may be noticed during the day as well. They may get worse with activities such as driving, reading, typing, or holding a phone. Symptoms can include:  · Tingling and numbness in the hand or wrist  · Sharp pain that shoots up the arm or down to the fingers  · Hand stiffness or cramping, especially in the morning  · Trouble making a fist  · Hand weakness and clumsiness  Treatment for carpal tunnel syndrome  Certain treatments help reduce the pressure on the median nerve and relieve symptoms. Choices for treatment may include one or more of the following:  · Wrist splint. This involves wearing a special brace on the wrist and hand. The splint holds the wrist straight, in a neutral position. This helps keep the carpal tunnel as open as possible.  · Cortisone shots. Cortisone is a medicine that helps reduce swelling. It is injected directly into the wrist. It helps shrink tissues inside the carpal tunnel. This relieves symptoms for a time.  · Pain medicines. You may take over-the-counter or prescription medicines to help reduce swelling and relieve symptoms.  · Surgery. If the condition doesnt respond to other treatments and doesnt go away on its own, you may need surgery. During surgery, the surgeon cuts the transverse carpal ligament to relieve pressure on the median nerve.     When to call your healthcare provider  Call your healthcare provider right away if you have any of these:  · Fever of 100.4°F (38°C) or higher, or as directed  · Symptoms that dont get better, or get worse  · New symptoms   Date Last Reviewed: 3/10/2016  © 0973-2846 [employer]. [address] [place] [address], [place] [address]. All rights reserved. This information is not intended as a substitute for professional medical care. Always follow your healthcare professional's instructions.        Carpal Tunnel Release Surgery  Surgery may be done if your carpal tunnel syndrome (CTS) symptoms become  severe. Or, you may have surgery if no other treatment brings relief. There are 2 types of CTS procedures. You will be told about the one you will have. Youll also be instructed how to prepare for it.      The goals of surgery  Two types of surgery--open and endoscopic--are used to treat CTS.  · With open surgery, your surgeon makes one incision in your palm. Standard surgical tools are used.  · With endoscopic surgery, one or two small incisions may be made in your hand. A scope (with a very small camera attached) and tools are inserted under the carpal ligament. The surgeon then operates while watching images on a video screen. No matter which one you have, the goal remains the same: Your surgeon will relieve pressure on the median nerve. To do this, the transverse carpal ligament is cut (released).  After surgery  If youve had carpal tunnel surgery, you will spend a few hours resting before you go home. The nerve sensation and circulation in your hand will be checked at this time. For the safest healing, keep the following in mind.  · Keep your hand raised above heart level. This will help reduce swelling.  · Limit hand and wrist use as instructed. A wrist brace may be required.  · Take any pain medication as directed.  · Do hand exercises as directed by your surgeon or therapist.  When to call the surgeon  Call your surgeon if you notice any of the following:  · White or pale-blue hand or nails (If you pinch your skin or nail and the color doesnt return)  · Pain that is not relieved by prescribed medicine  · Loss of sensation or excess swelling in hand or fingers  · Fever over 100.4°F (38°C)   Date Last Reviewed: 9/11/2015  © 0015-0133 [employer]. [address] [place] [address]. All rights reserved. This information is not intended as a substitute for professional medical care. Always follow your healthcare professional's instructions.

## 2018-08-22 NOTE — PROGRESS NOTES
[facility] [address] [place] [address]  Phone: [phone]          Full Name: maura lovelace YOB: 1945  Patient ID: [id number]      Visit Date: 8/22/2018 10:46  Age: 73 Years 1 Months Old  Examining Physician: Luna Jay M.D.  Referring Physician:   Reason for Referral: uex pain        Chief Complaint   Patient presents with    Numbness     left wrist and hand     HPI: This is a 73 y.o.  female being seen in clinic today for evaluation of chronic left shoulder achy pain and limited ROM.  She had left shoulder arthroscopy in 2015 and has had injections since then that provided some relief.  She has achy pain and numbness from her elbow into her hand.  She admits to being diagnosed with CTS years ago, but never underwent surgery.  Resting her hand/arm provides minimal relief     History obtained from patient    Past family, medical, social, and surgical history reviewed in chart    Review of Systems:     General- denies lethargy, weight change, fever, chills  Head/neck- denies swallowing difficulties  ENT- denies hearing changes  Cardiovascular-denies chest pain  Pulmonary- +COPD  GI- denies constipation or bowel incontinence  - denies bladder incontinence  Skin- denies wounds or rashes  Musculoskeletal- + weakness, +pain  Neurologic- +numbness and tingling  Psychiatric- denies depressive or psychotic features, denies anxiety  Lymphatic-denies swelling  Endocrine- denies hypoglycemic symptoms/DM history  All other pertinent systems negative     Physical Examination:  General: Well developed, well nourished female, NAD  HEENT:NCAT EOMI bilaterally   Pulmonary:Normal respirations    Spinal Examination: CERVICAL  Active ROM is within normal limits.  Inspection: No deformity of spinal alignment.    Spinal Examination: LUMBAR or THORACIC  Active ROM is within normal limits.  Inspection: No deformity of spinal alignment.      Musculoskeletal Tests:  Phalen:   Elbow  compression (ulnar): + on left  Tinels at wrist: + on left    Bilateral Upper and Lower Extremities:  Pulses are 2+ at radial bilaterally.  Shoulder/Elbow/Wrist/Hand ROM wnl except limited ROM at left shoulder -worse in abduction and forward flexion  Hip/Knee/Ankle ROM   Bilateral Extremities show normal capillary refill.  No signs of cyanosis, rubor, edema, skin changes, or dysvascular changes of appendages.  Nails appear intact.    Neurological Exam:  Cranial Nerves:  II-XII grossly intact    Manual Muscle Testing: (Motor 5=normal)  5/5 strength bilateral upper extremities except 3+/5 sab due to discomfort    No focal atrophy is noted of either upper or lower extremity.    Bilateral Reflexes:hypo at bic tric br  Tapia's response is absent bilaterally.    Sensation: tested to light touch  - intact in arms  Gait: Narrow base and good arm swing.      Entire procedure explained to patient prior to proceeding.  Verbal consent obtained      SNC      Nerve / Sites Rec. Site Onset Lat Peak Lat Amp Segments Distance Velocity     ms ms µV  mm m/s   L Median - Digit II (Antidromic)      Wrist Dig II 4.3 6.1 9.3 Wrist - Dig  33   R Median - Digit II (Antidromic)      Wrist Dig II 4.2 5.5 12.0 Wrist - Dig  34   L Ulnar - Digit V (Antidromic)      Wrist Dig V 2.6 3.5 15.4 Wrist - Dig V 140 54   L Radial - Anatomical snuff box (Forearm)      Forearm Wrist 1.9 2.3 12.8 Forearm - Wrist 100 53       MNC      Nerve / Sites Muscle Latency Amplitude Duration Rel Amp Segments Distance Lat Diff Velocity     ms mV ms %  mm ms m/s   L Median - APB      Wrist APB 6.3 4.3 6.3 100 Wrist - APB 80        Elbow APB 10.3 3.8 7.2 89.1 Elbow - Wrist 215 4.0 54   R Median - APB      Wrist APB 4.5 3.7 8.8 100 Wrist - APB 80        Elbow APB 10.5 3.0 7.7 80.7 Elbow - Wrist  6.0    L Ulnar - ADM      Wrist ADM 2.6 6.2 5.9 100 Wrist - ADM 80        B.Elbow ADM 6.8 6.3 6.3 102 B.Elbow - Wrist 230 4.2 55      A.Elbow ADM 8.5 6.1 6.4 97.2  A.Elbow - B.Elbow 100 1.8 56         A.Elbow - Wrist  6.0                           INTERPRETATION  -Bilateral median motor nerve conduction study showed normal latency, amplitude, and conduction velocity  -Bilateral median sensory nerve conduction study showed normal peak latency and amplitude  -Bilateral ulnar motor nerve conduction study showed normal latency, amplitude, and conduction velocity  -Bilateral ulnar sensory nerve conduction study showed normal peak latency and amplitude  -Bilateral radial sensory nerve conduction study showed normal peak latency and amplitude    IMPRESSION  1. ABNORMAL study  2. There is electrodiagnostic evidence of a MODERATE demyelinating median neuropathy (Carpal tunnel syndrome) across the LEFT wrist and a MODERATE-SEVERE demyelinating and axonal CTS across the RIGHT wrist    PLAN  1. Follow up with referring provider: Dr. Richie Teixeira Sr.  2. Handouts on CTS provided  3. This study is good for one year. If symptoms worsen or do not improve, please re-consult.    Luna Jay M.D.  Physical Medicine and Rehab

## 2018-08-28 ENCOUNTER — OFFICE VISIT (OUTPATIENT)
Dept: ORTHOPEDICS | Facility: CLINIC | Age: 73
End: 2018-08-28
Payer: MEDICARE

## 2018-08-28 VITALS
WEIGHT: 257 LBS | HEIGHT: 60 IN | BODY MASS INDEX: 50.45 KG/M2 | SYSTOLIC BLOOD PRESSURE: 159 MMHG | HEART RATE: 76 BPM | DIASTOLIC BLOOD PRESSURE: 79 MMHG

## 2018-08-28 DIAGNOSIS — G56.03 CARPAL TUNNEL SYNDROME ON BOTH SIDES: ICD-10-CM

## 2018-08-28 DIAGNOSIS — M25.512 LEFT SHOULDER PAIN, UNSPECIFIED CHRONICITY: ICD-10-CM

## 2018-08-28 DIAGNOSIS — M19.012 ARTHRITIS OF LEFT SHOULDER REGION: ICD-10-CM

## 2018-08-28 DIAGNOSIS — Z01.818 PREOP TESTING: Primary | ICD-10-CM

## 2018-08-28 PROCEDURE — 99999 PR PBB SHADOW E&M-EST. PATIENT-LVL IV: CPT | Mod: PBBFAC,,, | Performed by: ORTHOPAEDIC SURGERY

## 2018-08-28 PROCEDURE — 3078F DIAST BP <80 MM HG: CPT | Mod: CPTII,S$GLB,, | Performed by: ORTHOPAEDIC SURGERY

## 2018-08-28 PROCEDURE — 99215 OFFICE O/P EST HI 40 MIN: CPT | Mod: S$GLB,,, | Performed by: ORTHOPAEDIC SURGERY

## 2018-08-28 PROCEDURE — 3077F SYST BP >= 140 MM HG: CPT | Mod: CPTII,S$GLB,, | Performed by: ORTHOPAEDIC SURGERY

## 2018-08-28 NOTE — PROGRESS NOTES
SUBJECTIVE:  Patient is status post left total knee 2009/2010.  This patient complains of numbness and tingling in the hand and wrist.    Patient is continue the wrist immobilizer, anti-inflammatories as well as vitamin B.  Patient states that her wrist symptoms have been present for greater than 6 months and has not responded to conservative treatment.      Was seen several weeks ago for right shoulder pain, received steroid injection, and now feels 100% better on the shoulder.     HPI: under went the above surgery, was better for a while. Over past few weeks ROM is decreasing, pain increasing on the medial side of the knee.  She's noticed that she change her diet to last week eating last wheat and gluten and the symptoms are improving.         Past Medical History:   Diagnosis Date    Arthritis     Asthma     COPD (chronic obstructive pulmonary disease)     Diabetes mellitus     Encounter for blood transfusion     Hypertension     Lung disease     Sleep apnea      Past Surgical History:   Procedure Laterality Date    APPENDECTOMY      CARDIAC SURGERY      valve replacement 12/2014    CATARACT EXTRACTION W/ INTRAOCULAR LENS  IMPLANT, BILATERAL      CHOLECYSTECTOMY      CYST REMOVAL      HERNIA REPAIR      left, 3/2015    HYSTERECTOMY      KNEE ARTHROPLASTY      shoulder       Review of patient's allergies indicates:   Allergen Reactions    Ace inhibitors Anaphylaxis    Ultram [tramadol] Anaphylaxis     Current Outpatient Medications on File Prior to Visit   Medication Sig Dispense Refill    albuterol (ACCUNEB) 0.63 mg/3 mL Nebu INHALE 1 VIAL VIA NEBULIZER EVERY 6 HOURS AS NEEDED 375 mL 11    albuterol 90 mcg/actuation inhaler Inhale 2 puffs into the lungs every 4 (four) hours as needed for Wheezing. 18 g 11    ALCOHOL PREP PADS PadM       alendronate (FOSAMAX) 70 MG tablet TAKE 1 TABLET ONCE A WEEK ORALLY 90 DAYS  1    amlodipine (NORVASC) 10 MG tablet Take 10 mg by mouth once daily.        ammonium lactate 12 % Crea Apply 1 Applicatorful topically.      aspirin (ECOTRIN) 81 MG EC tablet Take 81 mg by mouth once daily.      atorvastatin (LIPITOR) 40 MG tablet Take 40 mg by mouth once daily.   1    BENICAR 40 mg tablet Take 40 mg by mouth once daily.       CLEVER CHOICE BLOOD GLUC SYS Misc       doxazosin (CARDURA) 2 MG tablet Take 2 mg by mouth once daily.       EMBRACE BLOOD GLUCOSE SYSTEM Strp       furosemide (LASIX) 40 MG tablet TAKE 1 TABLET BY MOUTH TWICE A DAY 60 tablet 0    gabapentin (NEURONTIN) 300 MG capsule Take 300 mg by mouth 2 (two) times daily.       hydrocodone-acetaminophen 10-325mg (NORCO)  mg Tab Take 1 tablet by mouth every 6 (six) hours as needed.      LANCING DEVICE WITH LANCETS Misc       lidocaine-prilocaine (EMLA) cream       LITE TOUCH LANCETS 30 gauge Misc       metformin (GLUCOPHAGE) 500 MG tablet Take 500 mg by mouth 2 (two) times daily with meals.      metolazone (ZAROXOLYN) 2.5 MG tablet Take 1 tablet by mouth once daily.      metoprolol tartrate (LOPRESSOR) 50 MG tablet Take 1 tablet by mouth 2 (two) times daily.      montelukast (SINGULAIR) 10 mg tablet TAKE 1 TABLET (10 MG TOTAL) BY MOUTH EVERY EVENING. 30 tablet 11    nabumetone (RELAFEN) 500 MG tablet Take 1 tablet (500 mg total) by mouth once daily. 30 tablet 2    potassium chloride SA (K-DUR,KLOR-CON) 20 MEQ tablet Take 1 tablet by mouth once daily.      ranitidine (ZANTAC) 150 MG tablet Take 150 mg by mouth.      SYMBICORT 160-4.5 mcg/actuation HFAA INHALE 2 PUFFS INTO THE LUNGS EVERY 12 (TWELVE) HOURS. 10.2 Inhaler 9    tiotropium bromide (SPIRIVA RESPIMAT) 2.5 mcg/actuation Mist Inhale 2 puffs into the lungs once daily. Controller 4 g 11    vitamin E 1000 UNIT capsule Take 1,000 Units by mouth once daily.      diclofenac sodium 1 % Gel Apply 2 g topically 4 (four) times daily. 1 Tube 2     No current facility-administered medications on file prior to visit.      BP (!) 159/79    Pulse 76   Ht 5' (1.524 m)   Wt 116.6 kg (257 lb)   BMI 50.19 kg/m²    ROS:  GENERAL: No fever, chills, fatigability or weight loss.  MUSCULOSKELETAL: See HPI    PE:  APPEARANCE: Well nourished, well developed, in no acute distress.   NEUROLOGIC: Cranial Nerves: II-XII grossly intact, also see MUSCULOSKELETAL  MUSCULOSKELETAL:     Left  Shoulder-tenderness over the rotator cuff insertion, positive impingement, fair range of motion. Neurovascularly intact.      Left wrist/hand-      - Positive- Tinel's over the Median nerve  Positive- Phalen maneuver   Positive- Thenar atrophy   Negative- hypothenar atrophy   Positive- Median Nerve Compression test less than 30 seconds   Negative- Tinel's over Guyon's Canal   Negative- Tinel's over Cubital Tunnel Negative- Tinels over the Median Nerve overlying the antecubital  Fossa   Negative- Tinel's over Radial groove  Negative- Tinel's over sensory branch of Radial nerve at the wrist  Negative- Tinel's over the PIN   Negative- pain in forearm with resistance to             FDP flexion and resisted pronation   Positive- boggy synovium of the wrist   normal- less than 2 seconds capillary all digits  Positive- light touch intact in Median nerve distribution Positive- light touch in the Radial Nerve distribution  Positive- light touch intact in the Ulnar sensory nerve distribution    Positive- Thumb opposition strength symmetrical  Negative- bruit(aneurysm)    Positive- skin color intact(claudication/Raynaud's)  Negative- cold digits(claudication/Raynaud's)  normal - Morro Test(Vascular Exam)  normal- +2 Radial and Ulnar artery pulses  Negative- anatomical snuff box tenderness(Dequervain's Synovitis)  Negative- finger or thumb triggering(Trigger Thumb or Finger)  Negative- Lipoma  Negative- Ganglion cyst      Sensory exam-C5,C6,C7,C8,T1- normal    Wrist extension for radial nerve- +5/5  Wrist Flexion-+5/5  Wrist Ulnar Deviation-+5/5  Wrist Radial Deviation- +5/5  Resisted  opposition of the thumb for the median nerve- +5/5  Digit abduction for the ulnar nerve- +5/5    Left knee- 2 plus radial  artery and ulnar artery pulses, light touch intact left lower extremity.  All digits are warm. No erythema, no warmth, no drainage, No swelling, mild tenderness along the medial lateral ligament area  Diagnosis:     1.  Left knee synovitis               2.  Left Carpal tunnel syndrome               3.  Left Shoulder tendinopathy and impingement.  Assessment:  Patient understands and does that she does have minimal degree of arthritis in the left shoulder.  She does appear to have rotator cuff tendinopathy as well as impingement.  Patient understands that an arthroscope may be beneficial with addressing intra-articular pathology, except arthritis. An arthroscope of the shoulder could be diagnostic and therapeutic.  I have discussed the results of the EMG nerve conduction study. Patient understands that she may benefit from a left carpal tunnel release.    PLAN:    Follow up in 3 weeks   EMG nerve conduction study results discussed.   Left shoulder ATS  Left Carpal tunnel release

## 2018-08-28 NOTE — PATIENT INSTRUCTIONS
ACE Wrap  Minor muscle or joint injuries are often treated with an elastic bandage. The bandage provides support and compression to the injured area. An elastic bandage is a stretchy, rolled bandage. Elastic bandages range in width from 2 to 6 inches. They can be used for a variety of injuries. The bandages are often called ACE bandages, after the most common brand name.  If used correctly, elastic bandages help control swelling and ease pain. An elastic bandage is also a good reminder not to overuse the injured area. However, elastic bandages do not provide a lot of support and will not prevent reinjury.  Home care    To apply an elastic bandage:  · Check the skin before wrapping the injury. It should be clean, dry, and free of drainage.  · Start wrapping below the injury and work your way toward the body. For an ankle sprain, start wrapping around the foot and work up toward the calf. This will help control swelling.  · Overlap the edges of the bandage so it stays snuggly in place.  · Wrap the bandage firmly, but not too tightly. A tight bandage can increase swelling on either end of the bandage. Make sure the bandage is wrinkle free.  · Leave fingers and toes exposed.  · Secure ends of the bandage (even self-sticking ones) with clips or tape.  · Check frequently to ensure adequate circulation, especially in the fingers and toes. Loosen the bandage if there is local swelling, numbness, tingling, discomfort, coldness, or discoloration (skin pale or bluish in color).  · Rewrap the bandage as needed during the day. Reroll the bandage as you unwind it.  Continue using the elastic bandage until the pain and swelling are gone or as your healthcare provider advises.  If you have been told to ice the area, the ice can be secured in place with the elastic bandage. Wrap the ice pack with a thin towel to protect the skin. Do not put ice or an ice pack directly on the skin.  Ice the area for no more than 20 minutes at a  time.    Follow-up care  Follow up with your healthcare provider, as advised.  When to seek medical advice  Call your healthcare provider for any of the following:  · Pain and swelling that doesn't get better or gets worse  · Trouble moving injured area  · Skin discoloration, numbness, or tingling that doesnt go away after bandage is removed  Date Last Reviewed: 9/13/2015  © 2797-4783 [employer]. [address] [place] [address], [place] [address]. All rights reserved. This information is not intended as a substitute for professional medical care. Always follow your healthcare professional's instructions.

## 2018-10-19 ENCOUNTER — TELEPHONE (OUTPATIENT)
Dept: ORTHOPEDICS | Facility: CLINIC | Age: 73
End: 2018-10-19

## 2018-10-19 NOTE — TELEPHONE ENCOUNTER
----- Message from Dinesh Taylor MA sent at 10/19/2018  9:44 AM CDT -----  Contact: pt       ----- Message -----  From: Alejo Martinez  Sent: 10/19/2018   9:07 AM  To: L[facility]via Quiroz Staff    Pt is requesting a call back from the nurse in regards to her rescheduling her surgery for next year.  [phone] (home)

## 2018-11-23 ENCOUNTER — TELEPHONE (OUTPATIENT)
Dept: ORTHOPEDICS | Facility: CLINIC | Age: 73
End: 2018-11-23

## 2018-11-23 NOTE — TELEPHONE ENCOUNTER
Spoke with the patient about her appointment on 11/29 with Dr. Teixeira. Patient wanted to know if she still needed this appointment since she canceled her surgery. I informed the patient that appointment was made for her first po visit after surgery. Patient states that she would like to cancel her appointment because she canceled her surgery. Her appointment for 11/29 has been canceled like she asked. Patient verbalized understanding. Patient was thankful for the call back.FP        ----- Message from Castro Perdomo sent at 11/23/2018  9:18 AM CST -----  Contact: self  requesting call back regarding questions about appt on 11/29.    Thanks,  Castro Perdomo

## 2019-01-02 ENCOUNTER — OFFICE VISIT (OUTPATIENT)
Dept: PULMONOLOGY | Facility: CLINIC | Age: 74
End: 2019-01-02
Payer: MEDICARE

## 2019-01-02 VITALS
HEART RATE: 77 BPM | OXYGEN SATURATION: 96 % | HEIGHT: 60 IN | RESPIRATION RATE: 20 BRPM | DIASTOLIC BLOOD PRESSURE: 68 MMHG | BODY MASS INDEX: 51.29 KG/M2 | WEIGHT: 261.25 LBS | SYSTOLIC BLOOD PRESSURE: 118 MMHG

## 2019-01-02 DIAGNOSIS — G47.33 OSA ON CPAP: Chronic | ICD-10-CM

## 2019-01-02 DIAGNOSIS — Z74.09 IMPAIRED PHYSICAL MOBILITY: Chronic | ICD-10-CM

## 2019-01-02 DIAGNOSIS — J45.30 MILD PERSISTENT ASTHMA WITHOUT COMPLICATION: Primary | Chronic | ICD-10-CM

## 2019-01-02 DIAGNOSIS — E66.01 MORBID OBESITY WITH BMI OF 50.0-59.9, ADULT: Chronic | ICD-10-CM

## 2019-01-02 PROCEDURE — 3078F DIAST BP <80 MM HG: CPT | Mod: CPTII,S$GLB,, | Performed by: INTERNAL MEDICINE

## 2019-01-02 PROCEDURE — 99999 PR PBB SHADOW E&M-EST. PATIENT-LVL III: CPT | Mod: PBBFAC,,, | Performed by: INTERNAL MEDICINE

## 2019-01-02 PROCEDURE — 99999 PR PBB SHADOW E&M-EST. PATIENT-LVL III: ICD-10-PCS | Mod: PBBFAC,,, | Performed by: INTERNAL MEDICINE

## 2019-01-02 PROCEDURE — 3074F SYST BP LT 130 MM HG: CPT | Mod: CPTII,S$GLB,, | Performed by: INTERNAL MEDICINE

## 2019-01-02 PROCEDURE — 1101F PR PT FALLS ASSESS DOC 0-1 FALLS W/OUT INJ PAST YR: ICD-10-PCS | Mod: CPTII,S$GLB,, | Performed by: INTERNAL MEDICINE

## 2019-01-02 PROCEDURE — 99215 OFFICE O/P EST HI 40 MIN: CPT | Mod: S$GLB,,, | Performed by: INTERNAL MEDICINE

## 2019-01-02 PROCEDURE — 3074F PR MOST RECENT SYSTOLIC BLOOD PRESSURE < 130 MM HG: ICD-10-PCS | Mod: CPTII,S$GLB,, | Performed by: INTERNAL MEDICINE

## 2019-01-02 PROCEDURE — 1101F PT FALLS ASSESS-DOCD LE1/YR: CPT | Mod: CPTII,S$GLB,, | Performed by: INTERNAL MEDICINE

## 2019-01-02 PROCEDURE — 99215 PR OFFICE/OUTPT VISIT, EST, LEVL V, 40-54 MIN: ICD-10-PCS | Mod: S$GLB,,, | Performed by: INTERNAL MEDICINE

## 2019-01-02 PROCEDURE — 3078F PR MOST RECENT DIASTOLIC BLOOD PRESSURE < 80 MM HG: ICD-10-PCS | Mod: CPTII,S$GLB,, | Performed by: INTERNAL MEDICINE

## 2019-01-02 RX ORDER — GLUCOSAMINE/D3/BOSWELLIA SERRA 1500MG-400
1 TABLET ORAL DAILY PRN
COMMUNITY

## 2019-01-02 RX ORDER — LANOLIN ALCOHOL/MO/W.PET/CERES
1 CREAM (GRAM) TOPICAL DAILY
COMMUNITY

## 2019-01-02 NOTE — PROGRESS NOTES
Subjective:      Patient ID: Saira العراقي is a 73 y.o. female.    Patient Active Problem List   Diagnosis    Right knee pain    Excessive sleepiness    Asthma    JACQUELINE on CPAP    Arthritis of right shoulder region    Right shoulder tendonitis    Carpal tunnel syndrome    Right carpal tunnel syndrome    Unspecified disorder of synovium, tendon, and bursa    Acromioclavicular (joint) (ligament) sprain    Impingement syndrome of right shoulder    Rotator cuff tendinitis    Postoperative state    Morbid obesity with BMI of 50.0-59.9, adult    Arthritis of left shoulder region    Impingement syndrome of left shoulder    Impaired physical mobility    GINO (acute kidney injury)    Synovitis of left knee    Numbness and tingling    Left knee pain    Left shoulder pain    Carpal tunnel syndrome on both sides    Preop testing     Problem list has been reviewed.    Chief Complaint: Shortness of Breath; Asthma; and Sleep Apnea    HPI    Her asthma is better controlled.      Her current respiratory therapy regimen is SINGULAIR, SYMBICORT, VENTOLIN and PROVENTIL.   which provides  relief. She is adherent with her regimen.  \    Answers for HPI/ROS submitted by the patient on 1/2/2019   Asthma  In the past 4 weeks, how much of the time did your asthma keep you from getting as much done at work, school, or at home?: a little of the time  During the past 4 weeks, how often have you had shortness of breath?: more than once a day  During the past 4 weeks, how often did your asthma symptoms (Wheezing, coughing, shortness of breath, chest tightness or pain) wake you up at night or earlier that usual in the morning?: not at all  During the past 4 weeks, how often have you used your rescue inhaler or nebulizer medication (such as albuterol)?: 3 or more times per day  How would you rate your asthma control during the past 4 weeks?: somewhat controlled   : 14      Does she do nebulizer treatments? yes  Does she use  an inhaler? yes  Does she use a spacer with MDIs? no  Does she monitor peak flow rates? no   What is her personal best peak flow rate: n/a      Current Disease Severity  frequent daytime asthma symptoms.   weekly nighttime asthma symptoms.   daily.   Number of urgent/emergent visit in last year: 1 - 2 times.  Current limitations in activity from asthma: none.   Number of days of school or work missed in the last month: not applicable.     Asthma Classification (General Symptom Frequency):  Mild Intermittent (< 2 x wk)  Mild Persistent (> 2 x wk, < daily)  Moderate Persistent (daily; almost daily inhaler)  Severe Persistent (continual; limited activities)      She is on CPAP of 13 cms. She did not bring her data card for compliance download.  Patient denies snoring, headaches, excessive daytime sleepiness. She definitely thinks PAP is beneficial to her health and she wants to continue with PAP therapy.     [place] Sleepiness Scale   EPWORTH SLEEPINESS SCALE 1/2/2019 2/2/2018 11/17/2015 9/17/2015 5/11/2015   Sitting and reading 0 1 3 3 0   Watching TV 3 3 3 3 2   Sitting, inactive in a public place (e.g. a theatre or a meeting) 0 1 0 0 0   As a passenger in a car for an hour without a break 1 0 2 0 0   Lying down to rest in the afternoon when circumstances permit 3 3 3 2 3   Sitting and talking to someone 0 0 0 0 0   Sitting quietly after a lunch without alcohol 3 2 3 2 2   In a car, while stopped for a few minutes in traffic 0 0 0 0 0   Total score 10 10 14 10 7       Immunization status reviewed and updated.    A full  review of systems, past , family  and social histories was performed except as mentioned in the note above, these are non contributory to the main issues discussed Today.     Previous Report Reviewed: office notes     The following portions of the patient's history were reviewed and updated as appropriate: She  has a past medical history of Arthritis, Asthma, COPD (chronic obstructive pulmonary  disease), Diabetes mellitus, Encounter for blood transfusion, Hypertension, Lung disease, and Sleep apnea.  She  has a past surgical history that includes Knee Arthroplasty; shoulder; Cyst Removal; Appendectomy; Hysterectomy; Cholecystectomy; Cardiac surgery; Hernia repair; and Cataract extraction w/ intraocular lens  implant, bilateral.  Her family history includes Cancer in her father and mother; Diabetes in her mother; Heart failure in her maternal grandfather and son; Hypertension in her father and mother.  She  reports that she quit smoking about 5 years ago. She has a 50.00 pack-year smoking history. she has never used smokeless tobacco. She reports that she does not drink alcohol or use drugs.  She has a current medication list which includes the following prescription(s): albuterol, albuterol, alcohol prep pads, alendronate, ammonium lactate, aspirin, atorvastatin, benicar, clever choice blood gluc sys, embrace blood glucose system, furosemide, gabapentin, hydrocodone-acetaminophen, lancing device with lancets, lidocaine-prilocaine, lite touch lancets, metformin, metolazone, montelukast, nabumetone, potassium chloride sa, ranitidine, symbicort, vitamin e, amlodipine, calcium citrate-vitamin d3 315-200 mg, diclofenac sodium, doxazosin, glucosamine-d3-boswellia serr, metoprolol tartrate, omega-3 fatty acids, and tiotropium bromide.  She is allergic to ace inhibitors and ultram [tramadol]..    Review of Systems   Constitutional: Negative for fever, chills and fatigue.   HENT: Positive for rhinorrhea, sinus pressure and congestion. Negative for postnasal drip.    Eyes: Positive for itching.   Respiratory: Positive for shortness of breath and dyspnea on extertion. Negative for cough, hemoptysis, sputum production and wheezing.    Cardiovascular: Positive for leg swelling. Negative for chest pain.   Genitourinary: Negative for difficulty urinating and hematuria.   Musculoskeletal: Positive for arthralgias and back  pain.   Skin: Negative for rash.   Gastrointestinal: Negative for nausea, vomiting and abdominal pain.   Neurological: Negative for dizziness, syncope, light-headedness and headaches.   Hematological: Bleeds easily.   Psychiatric/Behavioral: The patient is not nervous/anxious.       Objective:     /68   Pulse 77   Resp 20   Ht 5' (1.524 m)   Wt 118.5 kg (261 lb 3.9 oz)   SpO2 96%   BMI 51.02 kg/m²   Body mass index is 51.02 kg/m².    Physical Exam   Constitutional: She is oriented to person, place, and time. She appears well-developed and well-nourished.   Obese   HENT:   Head: Normocephalic and atraumatic.   Neck: Normal range of motion. Neck supple.   Cardiovascular: Normal rate and regular rhythm. Exam reveals no gallop.   No murmur heard.  Pulmonary/Chest: Effort normal and breath sounds normal.   Abdominal: Soft. Bowel sounds are normal.   Musculoskeletal: Normal range of motion. She exhibits edema.   Neurological: She is alert and oriented to person, place, and time.   Skin: Skin is warm and dry.   Psychiatric: She has a normal mood and affect.       Personal Diagnostic Review    NONE PERTINENT    Assessment / Plan:     Discussed diagnosis, its evaluation, treatment and usual course. All questions answered.    Problem List Items Addressed This Visit        Pulmonary    Asthma - Primary (Chronic)    Current Assessment & Plan     Asthma ROS: Taking medications as instructed, no medication side effects noted, no significant ongoing wheezing or shortness of breath, using bronchodilator MDI less than twice a week.   New concerns: None.   Exam: appears well, vitals normal, no respiratory distress, acyanotic, normal RR, chest clear, no wheezing, crepitations, rhonchi, normal symmetric air entry.   Assessment:  Asthma Somewhat controlled.   Plan: SINGULAIR, SYMBICORT, VENTOLIN and PROVENTIL. Current treatment plan is effective, no change in therapy, reviewed use of rescue vs controlling agents, oral and  inhaled meds and potential side effects. PFT and CXR AS PREVIOUSLY SCHEDULED. 6MWT on next visit.          Relevant Orders    Pulmonary stress test       Endocrine    Morbid obesity with BMI of 50.0-59.9, adult (Chronic)    Current Assessment & Plan     General weight loss/lifestyle modification strategies discussed (elicit support from others; identify saboteurs; non-food rewards, etc).  Diet interventions: low calorie (1000 kCal/d) deficit diet.            Other    JACQUELINE on CPAP (Chronic)    Current Assessment & Plan     Continue CPAP of 13. (ALONZO SOLITARIO)     Discussed therapeutic goals for positive airway pressure therapy(CPAP or BiPAP): Ideal is usage 100% of nights for 6 - 8 hours per night. Minimum usage is 70% of night for at least 4 hours per night used   Pateint expressed understanding. All Questions answered.    CPAP supplies DUE IN 08/2019.          Relevant Orders    Pulse oximetry overnight    Impaired physical mobility    Current Assessment & Plan     Amb ref to Referred to physical medicine for WHEELCHAIR EVALUATION            Relevant Orders    Ambulatory Referral to Physical/Occupational Therapy          TIME SPENT WITH PATIENT: Time spent: 40 minutes in face to face  discussion concerning diagnosis, prognosis, review of lab and test results, benefits of treatment as well as management of disease, counseling of patient and coordination of care between various health  care providers . Greater than half the time spent was used for coordination of care and counseling of patient.     Follow-up in about 2 weeks (around 1/18/2019) for Asthma, JACQUELINE, Morbid Obesity.

## 2019-01-02 NOTE — ASSESSMENT & PLAN NOTE
Continue CPAP of 13. (ALONZO Davis TASHA)     Discussed therapeutic goals for positive airway pressure therapy(CPAP or BiPAP): Ideal is usage 100% of nights for 6 - 8 hours per night. Minimum usage is 70% of night for at least 4 hours per night used   Pateint expressed understanding. All Questions answered.    CPAP supplies DUE IN 08/2019.

## 2019-01-02 NOTE — PATIENT INSTRUCTIONS
Lung Anatomy  Your lungs take air in to give your body oxygen, which the body needs to work. Your lungs, like all the tissues in your body, are made up of billions of tiny specialized cells. Old lung cells die and are replaced by new, identical lung cells. This natural process helps ensure healthy lungs.    Date Last Reviewed: 11/1/2016  © 8580-8962 [employer]. [address] [place] [address], [place] [address]. All rights reserved. This information is not intended as a substitute for professional medical care. Always follow your healthcare professional's instructions.

## 2019-01-02 NOTE — ASSESSMENT & PLAN NOTE
Asthma ROS: Taking medications as instructed, no medication side effects noted, no significant ongoing wheezing or shortness of breath, using bronchodilator MDI less than twice a week.   New concerns: None.   Exam: appears well, vitals normal, no respiratory distress, acyanotic, normal RR, chest clear, no wheezing, crepitations, rhonchi, normal symmetric air entry.   Assessment:  Asthma Somewhat controlled.   Plan: SINGULAIR, SYMBICORT, VENTOLIN and PROVENTIL. Current treatment plan is effective, no change in therapy, reviewed use of rescue vs controlling agents, oral and inhaled meds and potential side effects. PFT and CXR AS PREVIOUSLY SCHEDULED. 6MWT on next visit.

## 2019-01-07 ENCOUNTER — TELEPHONE (OUTPATIENT)
Dept: PULMONOLOGY | Facility: CLINIC | Age: 74
End: 2019-01-07

## 2019-01-07 NOTE — TELEPHONE ENCOUNTER
----- Message from Yeny Dietz sent at 1/7/2019  8:19 AM CST -----  Contact: pt  Pt states the request for a mobil chair be sent to [employer] for approval.  The company it was sent to is not in network.

## 2019-01-07 NOTE — TELEPHONE ENCOUNTER
Patient's call returned, patient informed that medical necessity form will be forwarded to [employer] once signed by provider.

## 2019-01-17 ENCOUNTER — CLINICAL SUPPORT (OUTPATIENT)
Dept: PULMONOLOGY | Facility: CLINIC | Age: 74
End: 2019-01-17
Payer: MEDICARE

## 2019-01-17 DIAGNOSIS — G47.33 OSA ON CPAP: Chronic | ICD-10-CM

## 2019-01-17 PROCEDURE — 94762 N-INVAS EAR/PLS OXIMTRY CONT: CPT | Mod: S$GLB,,, | Performed by: INTERNAL MEDICINE

## 2019-01-17 PROCEDURE — 94762 PR NONINVASV OXYGEN SATUR, CONT: ICD-10-PCS | Mod: S$GLB,,, | Performed by: INTERNAL MEDICINE

## 2019-01-18 ENCOUNTER — CLINICAL SUPPORT (OUTPATIENT)
Dept: PULMONOLOGY | Facility: CLINIC | Age: 74
End: 2019-01-18
Payer: MEDICARE

## 2019-01-18 ENCOUNTER — OFFICE VISIT (OUTPATIENT)
Dept: PULMONOLOGY | Facility: CLINIC | Age: 74
End: 2019-01-18
Payer: MEDICARE

## 2019-01-18 ENCOUNTER — HOSPITAL ENCOUNTER (OUTPATIENT)
Dept: RADIOLOGY | Facility: HOSPITAL | Age: 74
Discharge: HOME | End: 2019-01-18
Attending: INTERNAL MEDICINE
Payer: MEDICARE

## 2019-01-18 ENCOUNTER — TELEPHONE (OUTPATIENT)
Dept: PULMONOLOGY | Facility: CLINIC | Age: 74
End: 2019-01-18

## 2019-01-18 VITALS
DIASTOLIC BLOOD PRESSURE: 60 MMHG | SYSTOLIC BLOOD PRESSURE: 132 MMHG | BODY MASS INDEX: 50.9 KG/M2 | BODY MASS INDEX: 50.85 KG/M2 | WEIGHT: 259 LBS | HEIGHT: 60 IN | OXYGEN SATURATION: 92 % | WEIGHT: 259.25 LBS | RESPIRATION RATE: 16 BRPM | HEART RATE: 77 BPM | HEIGHT: 60 IN

## 2019-01-18 DIAGNOSIS — J45.30 MILD PERSISTENT ASTHMA WITHOUT COMPLICATION: Chronic | ICD-10-CM

## 2019-01-18 DIAGNOSIS — G47.33 OSA ON CPAP: Chronic | ICD-10-CM

## 2019-01-18 DIAGNOSIS — E66.01 MORBID OBESITY WITH BMI OF 50.0-59.9, ADULT: Chronic | ICD-10-CM

## 2019-01-18 DIAGNOSIS — J98.4 CRLD (CHRONIC RESTRICTIVE LUNG DISEASE): Chronic | ICD-10-CM

## 2019-01-18 DIAGNOSIS — J45.40 MODERATE PERSISTENT ASTHMA WITHOUT COMPLICATION: Primary | Chronic | ICD-10-CM

## 2019-01-18 LAB
BRPFT: ABNORMAL
DLCO ADJ PRE: 10.92 ML/(MIN*MMHG) (ref 12.53–24)
DLCO SINGLE BREATH LLN: 12.53
DLCO SINGLE BREATH PRE REF: 59.8 %
DLCO SINGLE BREATH REF: 18.26
DLCOC SBVA LLN: 2.62
DLCOC SBVA PRE REF: 120.6 %
DLCOC SBVA REF: 4.31
DLCOC SINGLE BREATH LLN: 12.53
DLCOC SINGLE BREATH PRE REF: 59.8 %
DLCOC SINGLE BREATH REF: 18.26
DLCOVA LLN: 2.62
DLCOVA PRE REF: 120.6 %
DLCOVA PRE: 5.19 ML/(MIN*MMHG*L) (ref 2.62–5.99)
DLCOVA REF: 4.31
DLVAADJ PRE: 5.19 ML/(MIN*MMHG*L) (ref 2.62–5.99)
ERV LLN: 0.56
ERV PRE REF: 30.4 %
ERV REF: 0.56
ERVN2 LLN: 0.56
ERVN2 REF: 0.56
FEF 25 75 LLN: 0.51
FEF 25 75 PRE REF: 94.2 %
FEF 25 75 REF: 1.41
FEV1 FVC LLN: 65
FEV1 FVC PRE REF: 102.7 %
FEV1 FVC REF: 79
FEV1 LLN: 1.08
FEV1 PRE REF: 79.4 %
FEV1 REF: 1.58
FEV6 LLN: 1.12
FEV6 PRE REF: 88.1 %
FEV6 PRE: 1.52 L (ref 1.12–2.34)
FEV6 REF: 1.73
FRCN2 LLN: 1.66
FRCN2 REF: 2.48
FRCPLETH LLN: 1.66
FRCPLETH PREREF: 78.9 %
FRCPLETH REF: 2.48
FVC LLN: 1.4
FVC PRE REF: 76.9 %
FVC REF: 2.01
IVC PRE: 1.12 L (ref 1.4–2.62)
IVC SINGLE BREATH LLN: 1.4
IVC SINGLE BREATH PRE REF: 55.6 %
IVC SINGLE BREATH REF: 2.01
MVV LLN: 54
MVV PRE REF: 60.2 %
MVV REF: 63
PEF LLN: 2.18
PEF PRE REF: 66.2 %
PEF REF: 3.93
PRE DLCO: 10.92 ML/(MIN*MMHG) (ref 12.53–24)
PRE ERV: 0.17 L (ref 0.56–0.56)
PRE FEF 25 75: 1.33 L/S (ref 0.51–2.3)
PRE FET 100: 7.96 SEC
PRE FEV1 FVC: 80.83 % (ref 65.24–92.18)
PRE FEV1: 1.25 L (ref 1.08–2.07)
PRE FRC PL: 1.95 L
PRE FVC: 1.55 L (ref 1.4–2.62)
PRE MVV: 38 L/MIN (ref 53.69–72.63)
PRE PEF: 2.6 L/S (ref 2.18–5.69)
PRE RV: 1.78 L (ref 1.34–2.49)
PRE TLC: 3.38 L (ref 3.25–5.23)
RAW LLN: 3.06
RAW PRE REF: 171.1 %
RAW PRE: 5.23 CMH2O*S/L (ref 3.06–3.06)
RAW REF: 3.06
RV LLN: 1.34
RV PRE REF: 93 %
RV REF: 1.92
RVN2 LLN: 1.34
RVN2 REF: 1.92
RVN2TLCN2 LLN: 34
RVN2TLCN2 REF: 44
RVTLC LLN: 34
RVTLC PRE REF: 120.4 %
RVTLC PRE: 52.73 % (ref 34.19–53.37)
RVTLC REF: 44
TLC LLN: 3.25
TLC PRE REF: 79.8 %
TLC REF: 4.24
TLCN2 LLN: 3.25
TLCN2 REF: 4.24
VA PRE: 2.11 L (ref 4.09–4.09)
VA SINGLE BREATH LLN: 4.09
VA SINGLE BREATH PRE REF: 51.5 %
VA SINGLE BREATH REF: 4.09
VC LLN: 1.4
VC PRE REF: 79.5 %
VC PRE: 1.6 L (ref 1.4–2.62)
VC REF: 2.01
VCMAXN2 LLN: 1.4
VCMAXN2 REF: 2.01
VTGRAWPRE: 2.18 L

## 2019-01-18 PROCEDURE — 94618 PULMONARY STRESS TESTING: ICD-10-PCS | Mod: S$GLB,,, | Performed by: INTERNAL MEDICINE

## 2019-01-18 PROCEDURE — 3078F PR MOST RECENT DIASTOLIC BLOOD PRESSURE < 80 MM HG: ICD-10-PCS | Mod: CPTII,S$GLB,, | Performed by: INTERNAL MEDICINE

## 2019-01-18 PROCEDURE — 94010 BREATHING CAPACITY TEST: ICD-10-PCS | Mod: 59,S$GLB,, | Performed by: INTERNAL MEDICINE

## 2019-01-18 PROCEDURE — 94726 PULM FUNCT TST PLETHYSMOGRAP: ICD-10-PCS | Mod: S$GLB,,, | Performed by: INTERNAL MEDICINE

## 2019-01-18 PROCEDURE — 3078F DIAST BP <80 MM HG: CPT | Mod: CPTII,S$GLB,, | Performed by: INTERNAL MEDICINE

## 2019-01-18 PROCEDURE — 94729 DIFFUSING CAPACITY: CPT | Mod: S$GLB,,, | Performed by: INTERNAL MEDICINE

## 2019-01-18 PROCEDURE — 99999 PR PBB SHADOW E&M-EST. PATIENT-LVL III: ICD-10-PCS | Mod: PBBFAC,,, | Performed by: INTERNAL MEDICINE

## 2019-01-18 PROCEDURE — 1101F PR PT FALLS ASSESS DOC 0-1 FALLS W/OUT INJ PAST YR: ICD-10-PCS | Mod: CPTII,S$GLB,, | Performed by: INTERNAL MEDICINE

## 2019-01-18 PROCEDURE — 71046 X-RAY EXAM CHEST 2 VIEWS: CPT | Mod: 26,,, | Performed by: RADIOLOGY

## 2019-01-18 PROCEDURE — 3075F PR MOST RECENT SYSTOLIC BLOOD PRESS GE 130-139MM HG: ICD-10-PCS | Mod: CPTII,S$GLB,, | Performed by: INTERNAL MEDICINE

## 2019-01-18 PROCEDURE — 1101F PT FALLS ASSESS-DOCD LE1/YR: CPT | Mod: CPTII,S$GLB,, | Performed by: INTERNAL MEDICINE

## 2019-01-18 PROCEDURE — 99215 OFFICE O/P EST HI 40 MIN: CPT | Mod: 25,S$GLB,, | Performed by: INTERNAL MEDICINE

## 2019-01-18 PROCEDURE — 3075F SYST BP GE 130 - 139MM HG: CPT | Mod: CPTII,S$GLB,, | Performed by: INTERNAL MEDICINE

## 2019-01-18 PROCEDURE — 99999 PR PBB SHADOW E&M-EST. PATIENT-LVL III: CPT | Mod: PBBFAC,,, | Performed by: INTERNAL MEDICINE

## 2019-01-18 PROCEDURE — 94726 PLETHYSMOGRAPHY LUNG VOLUMES: CPT | Mod: S$GLB,,, | Performed by: INTERNAL MEDICINE

## 2019-01-18 PROCEDURE — 71046 XR CHEST PA AND LATERAL: ICD-10-PCS | Mod: 26,,, | Performed by: RADIOLOGY

## 2019-01-18 PROCEDURE — 94618 PULMONARY STRESS TESTING: CPT | Mod: S$GLB,,, | Performed by: INTERNAL MEDICINE

## 2019-01-18 PROCEDURE — 99215 PR OFFICE/OUTPT VISIT, EST, LEVL V, 40-54 MIN: ICD-10-PCS | Mod: 25,S$GLB,, | Performed by: INTERNAL MEDICINE

## 2019-01-18 PROCEDURE — 94729 PR C02/MEMBANE DIFFUSE CAPACITY: ICD-10-PCS | Mod: S$GLB,,, | Performed by: INTERNAL MEDICINE

## 2019-01-18 PROCEDURE — 71046 X-RAY EXAM CHEST 2 VIEWS: CPT | Mod: TC

## 2019-01-18 PROCEDURE — 94010 BREATHING CAPACITY TEST: CPT | Mod: 59,S$GLB,, | Performed by: INTERNAL MEDICINE

## 2019-01-18 RX ORDER — ISOPROPYL ALCOHOL 70 ML/100ML
SWAB TOPICAL
Status: CANCELLED | COMMUNITY
Start: 2019-01-18

## 2019-01-18 RX ORDER — FLUTICASONE FUROATE AND VILANTEROL 200; 25 UG/1; UG/1
1 POWDER RESPIRATORY (INHALATION) DAILY
Qty: 1 EACH | Refills: 5 | Status: SHIPPED | OUTPATIENT
Start: 2019-01-18 | End: 2019-07-01

## 2019-01-18 RX ORDER — ALBUTEROL SULFATE 90 UG/1
2 AEROSOL, METERED RESPIRATORY (INHALATION) EVERY 4 HOURS PRN
Qty: 18 G | Refills: 11 | Status: SHIPPED | OUTPATIENT
Start: 2019-01-18 | End: 2020-01-18

## 2019-01-18 RX ORDER — MONTELUKAST SODIUM 10 MG/1
10 TABLET ORAL NIGHTLY
Qty: 30 TABLET | Refills: 11 | Status: SHIPPED | OUTPATIENT
Start: 2019-01-18 | End: 2019-11-28 | Stop reason: SDUPTHER

## 2019-01-18 RX ORDER — ALBUTEROL SULFATE 0.63 MG/3ML
SOLUTION RESPIRATORY (INHALATION)
Qty: 120 VIAL | Refills: 11 | Status: SHIPPED | OUTPATIENT
Start: 2019-01-18 | End: 2019-10-09 | Stop reason: SDUPTHER

## 2019-01-18 NOTE — PROGRESS NOTES
Subjective:      Established patient    Patient ID: Saira العراقي is a 73 y.o. female.  Patient Active Problem List   Diagnosis    Right knee pain    Excessive sleepiness    Moderate persistent asthma without complication    JACQUELINE on CPAP    Arthritis of right shoulder region    Right shoulder tendonitis    Carpal tunnel syndrome    Right carpal tunnel syndrome    Unspecified disorder of synovium, tendon, and bursa    Acromioclavicular (joint) (ligament) sprain    Impingement syndrome of right shoulder    Rotator cuff tendinitis    Postoperative state    Morbid obesity with BMI of 50.0-59.9, adult    Arthritis of left shoulder region    Impingement syndrome of left shoulder    Impaired physical mobility    GINO (acute kidney injury)    Synovitis of left knee    Numbness and tingling    Left knee pain    Left shoulder pain    Carpal tunnel syndrome on both sides    Preop testing    CRLD (chronic restrictive lung disease)       Problem list has been reviewed.    Chief Complaint: Asthma and Sleep Apnea      HPI    PFT, 6 MWT and ONSAT reviewed with patient who expressed and voiced understanding.   Pateint has a mild asthma attack after completing her 6MWT. She required oxygen supplementation at 2 L /min.  All questions were answered to the patients satisfaction.      Patients reports NYHA II dyspnea    The patient does not have currently have symptoms / an exacerbation.      Her current respiratory therapy regimen is SINGULAIR, SYMBICORT, VENTOLIN and PROVENTIL which provides  relief. She is adherent with her regimen.     Asthma Control Test  In the past 4  weeks, how much of the time did your asthma keep you from getting as much done at work, school or at home?: None of the time  During the past 4 weeks, how often have you had shortness of breath?: More than once a day  During the past 4 weeks, how often did your asthma symptoms (wheezing, couging, shortness of breath, chest tightness or pain)  wake you up at night or earlier than usual in the morning?: Once or twice  During the past 4 weeks, how often have you used your rescue inhaler or nebulizer medication (such as albuterol)?: 2 or 3 times a week  How would you rate your asthma control during the past 4 weeks?: Somewhat controlled  If your score is 19 or less, your asthma may not be under control: 16      No recent change in breathing.     Current Disease Severity  frequent daytime asthma symptoms.   weekly nighttime asthma symptoms.   daily.   Number of urgent/emergent visit in last year: 1 - 2 times.  Current limitations in activity from asthma: none.   Number of days of school or work missed in the last month: not applicable.      Asthma Classification (General Symptom Frequency):  Mild Intermittent (< 2 x wk)  Mild Persistent (> 2 x wk, < daily)  Moderate Persistent (daily; almost daily inhaler)  Severe Persistent (continual; limited activities)       Immunization status reviewed and up to date.     She is on CPAP of 13 cms. She did not bring her data card for compliance download.  Patient denies snoring, headaches, excessive daytime sleepiness. She definitely thinks PAP is beneficial to her health and she wants to continue with PAP therapy.     [place] Sleepiness Scale   EPWORTH SLEEPINESS SCALE 1/18/2019 1/2/2019 2/2/2018 11/17/2015 9/17/2015 5/11/2015   Sitting and reading 3 0 1 3 3 0   Watching TV 3 3 3 3 3 2   Sitting, inactive in a public place (e.g. a theatre or a meeting) 0 0 1 0 0 0   As a passenger in a car for an hour without a break 0 1 0 2 0 0   Lying down to rest in the afternoon when circumstances permit 3 3 3 3 2 3   Sitting and talking to someone 0 0 0 0 0 0   Sitting quietly after a lunch without alcohol 2 3 2 3 2 2   In a car, while stopped for a few minutes in traffic 0 0 0 0 0 0   Total score 11 10 10 14 10 7        A full  review of systems, past , family  and social histories was performed except as mentioned in the note  above, these are non contributory to the main issues discussed today.       Previous Report Reviewed: lab reports, office notes and radiology reports     The following portions of the patient's history were reviewed and updated as appropriate: She  has a past medical history of Arthritis, Asthma, COPD (chronic obstructive pulmonary disease), Diabetes mellitus, Encounter for blood transfusion, Hypertension, Lung disease, and Sleep apnea.  She  has a past surgical history that includes Knee Arthroplasty; shoulder; Cyst Removal; Appendectomy; Hysterectomy; Cholecystectomy; Cardiac surgery; Hernia repair; and Cataract extraction w/ intraocular lens  implant, bilateral.  Her family history includes Cancer in her father and mother; Diabetes in her mother; Heart failure in her maternal grandfather and son; Hypertension in her father and mother.  She  reports that she quit smoking about 5 years ago. She has a 50.00 pack-year smoking history. she has never used smokeless tobacco. She reports that she does not drink alcohol or use drugs.  She has a current medication list which includes the following prescription(s): albuterol, albuterol, alcohol prep pads, alendronate, amlodipine, ammonium lactate, aspirin, atorvastatin, benicar, calcium citrate-vitamin d3 315-200 mg, clever choice blood gluc sys, doxazosin, embrace blood glucose system, furosemide, gabapentin, glucosamine-d3-boswellia serr, hydrocodone-acetaminophen, lancing device with lancets, lidocaine-prilocaine, lite touch lancets, metformin, metolazone, metoprolol tartrate, montelukast, nabumetone, omega-3 fatty acids, potassium chloride sa, ranitidine, tiotropium bromide, vitamin e, diclofenac sodium, and fluticasone-vilanterol.  She is allergic to ace inhibitors and ultram [tramadol]..    Review of Systems   Constitutional: Negative for fever, chills and fatigue.   HENT: Positive for rhinorrhea, sinus pressure and congestion. Negative for postnasal drip.    Eyes:  Positive for itching.   Respiratory: Positive for shortness of breath and dyspnea on extertion. Negative for cough, hemoptysis, sputum production and wheezing.    Cardiovascular: Positive for leg swelling. Negative for chest pain.   Genitourinary: Negative for difficulty urinating and hematuria.   Musculoskeletal: Positive for arthralgias and back pain.   Skin: Negative for rash.   Gastrointestinal: Negative for nausea, vomiting and abdominal pain.   Neurological: Negative for dizziness, syncope, light-headedness and headaches.   Hematological: Bleeds easily.   Psychiatric/Behavioral: The patient is not nervous/anxious.         Objective:     /60   Pulse 77   Resp 16   Ht 5' (1.524 m)   Wt 117.5 kg (259 lb)   SpO2 (!) 92%   BMI 50.58 kg/m²   Body mass index is 50.58 kg/m².     Physical Exam   Constitutional: She is oriented to person, place, and time. She appears well-developed and well-nourished.   Obese   HENT:   Head: Normocephalic and atraumatic.   Neck: Normal range of motion. Neck supple.   Cardiovascular: Normal rate and regular rhythm. Exam reveals no gallop.   No murmur heard.  Pulmonary/Chest: Effort normal and breath sounds normal.   Abdominal: Soft. Bowel sounds are normal.   Musculoskeletal: Normal range of motion. She exhibits edema.   Neurological: She is alert and oriented to person, place, and time.   Skin: Skin is warm and dry.   Psychiatric: She has a normal mood and affect.       Personal Diagnostic Review      Pulmonary function tests: FEV1: 1.25  (79.4 % predicted), FVC:  1.55 (76.9 % predicted), FEV1/FVC:  81, TLC: 3.38 (79.8 % predicted), RV/TLVC: 53 (120 % predicted), DLCO: 10.92 (59.8 % predicted)  Normal airflow. Nild restriction. Diffusion capacity is mildly reduced but corrects for alveolar volume.       Six Minute Walk Test: Six minute walk distance is 167.64 / 297.32 meters (56.38 % predicted) with very, very heavy dyspnea. During exercise, there was no significant  desaturation while breathing room air.  Both blood pressure and heart rate increased significantly with walking.  Normotension was present prior to exercise.  This may represent a normal cardiovascular response to exercise.  The patient reported non-pulmonary symptoms during exercise - chest tightness.  Pateint had a mild asthma attack after completing her 6MWT.   She required oxygen supplementation at 2 L /min temporarily.  Significant exercise impairment is likely due to subjective symptoms.  The patient did complete the study, walking 251 seconds of the 360 second test.  No previous study performed.  Based upon age and body mass index, exercise capacity is less than predicted.      Peak VO2 during walking was  9.01 ml/min/kg [2.57 METS]      CXR:19: There is some persistent pleural thickening in the region of the minor fissure.  There also some scarring changes noted within both mid to lower lung zones.  Chronic increased peribronchial markings are noted diffusely throughout the chest.  The heart is enlarged.  There is evidence for prior median sternotomy.  There is tortuosity of the descending thoracic aorta.  Surgical clips are noted in the right upper quadrant        OVERNIGHT OXIMETRY REPORT:    Dictated by: Cassius Langston M.D.  Test date: 18  Dictated on: 2019      Comment: This test was performed on Room Air     A desaturation event was defined as a decrease of saturation by 4 or more.    REPORT SUMMARY  Total recording time: 07:07:36  Excluded samplin:05:44  Total valid samplin:01:52  High pulse: 109 /min  Low pulse: 58 /min    Mean pulse: 75/min    High SpO2: 97%    Low SpO2: 66%    Mean SpO2  89.2 %  Cumulative time with oxygen saturation less than 88% (TC88): 01:02:32 ( 14.8%)          CLINICAL INTERPRETATION   There is  significant nocturnal oxygen desaturation and  Clinical correlation is advised    Medicare Criteria Comments:   Oximetry test results suggest the patient  falls under Medicare Group 1 Criteria. ( Arterial oxygen saturation at or below 88% for at least 5 minutes taken during sleep)    Details about Medicare Group Criteria coverage can be found at http://www.cms.hhs.gov/manuals/downloads/         Assessment / Plan:       Discussed diagnosis, its evaluation, treatment and usual course. All questions answered.    Problem List Items Addressed This Visit        Pulmonary    Moderate persistent asthma without complication - Primary    Current Assessment & Plan     Asthma ROS: Taking medications as instructed, no medication side effects noted, no significant ongoing wheezing or shortness of breath, using bronchodilator MDI less than twice a week.   New concerns: None.   Exam: appears well, vitals normal, no respiratory distress, acyanotic, normal RR, chest clear, no wheezing, crepitations, rhonchi, normal symmetric air entry.   Assessment:  Asthma Somewhat controlled.   Plan: STOP SYMBICORT. START BREO. CONTINUE SINGULAIR, VENTOLIN and PROVENTIL. Current treatment plan is effective, no change in therapy, reviewed use of rescue vs controlling agents, oral and inhaled meds and potential side effects.         Relevant Medications    albuterol (ACCUNEB) 0.63 mg/3 mL Nebu    montelukast (SINGULAIR) 10 mg tablet    fluticasone-vilanterol (BREO ELLIPTA) 200-25 mcg/dose DsDv diskus inhaler    albuterol (PROVENTIL/VENTOLIN HFA) 90 mcg/actuation inhaler    CRLD (chronic restrictive lung disease)    Current Assessment & Plan     Restrictive lung disease secondary to body habitus / interstitial disease.   Weight loss encouraged. Continue BREO, ALBUTEROL, ACCUNEB.            Endocrine    Morbid obesity with BMI of 50.0-59.9, adult (Chronic)    Current Assessment & Plan     General weight loss/lifestyle modification strategies discussed (elicit support from others; identify saboteurs; non-food rewards, etc).  Diet interventions: low calorie (1000 kCal/d) deficit diet.            Other     JACQUELINE on CPAP (Chronic)    Current Assessment & Plan     Continue CPAP of 13. (ALONZO SOLITARIO)     Discussed therapeutic goals for positive airway pressure therapy(CPAP or BiPAP): Ideal is usage 100% of nights for 6 - 8 hours per night. Minimum usage is 70% of night for at least 4 hours per night used   Pateint expressed understanding. All Questions answered.    CPAP supplies DUE IN 08/2019.                  TIME SPENT WITH PATIENT: Time spent: 45 minutes in face to face  discussion concerning diagnosis, prognosis, review of lab and test results, benefits of treatment as well as management of disease, counseling of patient and coordination of care between various health  care providers . Greater than half the time spent was used for coordination of care and counseling of patient.       Follow-up in about 7 months (around 8/19/2019) for JACQUELINE, Morbid Obesity, Asthma.

## 2019-01-18 NOTE — TELEPHONE ENCOUNTER
----- Message from Lanny Ward sent at 1/18/2019  2:33 PM CST -----  needs callback  med, will elaborate....[phone] (home)

## 2019-01-18 NOTE — PROCEDURES
[facility]  [address] * [place] [address]  Telephone: [phone]  Test date: 18 Start: 18 22:06:17 Saira العراقي  Doctor: Dr Mazariegos End: 18 05:13:53 [id number]  Oximetry: Summary Report  Comments: Overnight study breathing room air.  Recording time: 07:07:36 Highest pulse: 109 Highest SpO2: 97%  Excluded samplin:05:44 Lowest pulse: 58 Lowest SpO2: 66%  Total valid samplin:01:52 Mean pulse: 75 Mean SpO2: 89.2%  1 S.D.: 4.8 1 S.D.: 3.5  Time with SpO2<90: 2:25:04, 34.4%  Time with SpO2<80: 0:14:40, 3.5%  Time with SpO2<70: 0:00:12, 0.0%  Time with SpO2<60: 0:00:00, 0.0%  Time with SpO2<88: 1:02:32, 14.8%  Time with SpO2 =>90: 4:36:48, 65.6%  Time with SpO2=>80 & <90: 2:10:24, 30.9%  Time with SpO2=>70 & <80: 0:14:28, 3.4%  Time with SpO2=>60 & <70: 0:00:12, 0.0%  The longest continuous time with saturation <=88 was 00:12:40, which started at  18 02:29:25.  A desaturation event was defined as a decrease of saturation by 4 or more.  No events were excluded due to artifact.  There were 12 desaturation events over 3 minutes duration.  There were 260 desaturation events of less than 3 minutes duration during which:  The mean high was 89.9%. The mean low was 82.9%.  The number of these events that were:  > 0 & <10 seconds: 51 > 0 seconds: 260  =>10 & <20 seconds: 127 =>10 seconds: 209  =>20 & <30 seconds: 29 =>20 seconds: 82  =>30 & <40 seconds: 13 =>30 seconds: 53  =>40 & <50 seconds: 10 =>40 seconds: 40  =>50 & <60 seconds: 5 =>50 seconds: 30  =>60 seconds: 25 =>60 seconds: 25  The mean length of desaturation events that were >=10 sec & <=3 mins was: 28.8 sec.  Desaturation event index (events >=10 sec per sampled hour): 29.7  Desaturation event index (events >= 0 sec per sampled hour): 37.0      PHYSICIAN INTERPRETATION:    OVERNIGHT OXIMETRY REPORT:    Dictated by: Cassius Langston M.D.  Test date: 18  Dictated on: 2019      Comment: This test was  performed on Room Air     A desaturation event was defined as a decrease of saturation by 4 or more.    REPORT SUMMARY  Total recording time: 07:07:36  Excluded samplin:05:44  Total valid samplin:01:52  High pulse: 109 /min  Low pulse: 58 /min    Mean pulse: 75/min    High SpO2: 97%    Low SpO2: 66%    Mean SpO2  89.2 %  Cumulative time with oxygen saturation less than 88% (TC88): 01:02:32 ( 14.8%)          CLINICAL INTERPRETATION   There is  significant nocturnal oxygen desaturation and  Clinical correlation is advised    Medicare Criteria Comments:   Oximetry test results suggest the patient falls under Medicare Group 1 Criteria. ( Arterial oxygen saturation at or below 88% for at least 5 minutes taken during sleep)    Details about Medicare Group Criteria coverage can be found at http://www.cms.hhs.gov/manuals/downloads/

## 2019-01-18 NOTE — PATIENT INSTRUCTIONS
Lung Anatomy  Your lungs take air in to give your body oxygen, which the body needs to work. Your lungs, like all the tissues in your body, are made up of billions of tiny specialized cells. Old lung cells die and are replaced by new, identical lung cells. This natural process helps ensure healthy lungs.    Date Last Reviewed: 11/1/2016  © 3368-7084 [employer]. [address] [place] [address], [place] [address]. All rights reserved. This information is not intended as a substitute for professional medical care. Always follow your healthcare professional's instructions.

## 2019-01-18 NOTE — PROCEDURES
[facility]  Six Minute Walk     SUMMARY     Ordering Provider: MD Dr. Cassius Mazariegos immediately available as needed.  Performing nurse/tech/RT: Palmer  Diagnosis: (mild persistent asthma without complication)  Height: 5' (152.4 cm)  Weight: 117.6 kg (259 lb 4.2 oz)  BMI (Calculated): 50.7   Patient Race:             Phase Oxygen Assessment Supplemental O2 Heart   Rate Blood Pressure Miguel Dyspnea Scale Rating   Resting 95 % Room Air 85 bpm 132/60 3   Exercise        Minute        1 94 % Room Air 106 bpm     2 93 % Room Air 114 bpm     3 93 % Room Air 109 bpm     4 91 % Room Air 117 bpm     5 92 % Room Air 106 bpm     6  93 % Room Air 114 bpm 159/74 9   Recovery        Minute        1 93 % Room Air 112 bpm     2 96 % 2 L/M 88 bpm     3 96 % 2 L/M 84 bpm     4 98 % 2 L/M 86 bpm 133/64 3     Six Minute Walk Summary  6MWT Status: completed with stops  Patient Reported: Chest pain     Interpretation:  Did the patient stop or pause?: Yes  How many times did the patient stop or pause?: 3  Stop Time 1: 81  Restart Time 1: 90  Pause Time 1: 9 seconds  Stop Time 2: 113  Restart Time 2: 163  Pause Time 2: 50 seconds  Stop Time 3: 250 seconds  Restart Time 3: 300 seconds  Pause Time 3: 50 seconds           Total Time Walked (Calculated): 251 seconds  Final Partial Lap Distance (feet): 150 feet  Total Distance Meters (Calculated): 167.64 meters  Predicted Distance Meters (Calculated): 297.32 meters  Percentage of Predicted (Calculated): 56.38  Peak VO2 (Calculated): 9.01  Mets: 2.57  Has The Patient Had a Previous Six Minute Walk Test?: No  Comments: Patient stated she had chest pain during recovery. Placed patient on 2lpm oxygen. patient stated pain subsided. No other symptoms noted.      Previous 6MWT Results  Has The Patient Had a Previous Six Minute Walk Test?: No        PHYSICIAN INTERPRETATION:    Six minute walk distance is 167.64 / 297.32 meters (56.38 % predicted) with very,  very heavy dyspnea. During exercise, there was no significant desaturation while breathing room air.  Both blood pressure and heart rate increased significantly with walking.  Normotension was present prior to exercise.  This may represent a normal cardiovascular response to exercise.  The patient reported non-pulmonary symptoms during exercise - chest tightness.  Pateint had a mild asthma attack after completing her 6MWT.   She required oxygen supplementation at 2 L /min temporarily.  Significant exercise impairment is likely due to subjective symptoms.  The patient did complete the study, walking 251 seconds of the 360 second test.  No previous study performed.  Based upon age and body mass index, exercise capacity is less than predicted.      Peak VO2 during walking was  9.01 ml/min/kg [2.57 METS]

## 2019-01-18 NOTE — ASSESSMENT & PLAN NOTE
Restrictive lung disease secondary to body habitus / interstitial disease.   Weight loss encouraged. Continue BREO, ALBUTEROL, ACCUNEB.

## 2019-01-18 NOTE — ASSESSMENT & PLAN NOTE
Asthma ROS: Taking medications as instructed, no medication side effects noted, no significant ongoing wheezing or shortness of breath, using bronchodilator MDI less than twice a week.   New concerns: None.   Exam: appears well, vitals normal, no respiratory distress, acyanotic, normal RR, chest clear, no wheezing, crepitations, rhonchi, normal symmetric air entry.   Assessment:  Asthma Somewhat controlled.   Plan: STOP SYMBICORT. START BREO. CONTINUE SINGULAIR, VENTOLIN and PROVENTIL. Current treatment plan is effective, no change in therapy, reviewed use of rescue vs controlling agents, oral and inhaled meds and potential side effects.

## 2019-01-23 ENCOUNTER — TELEPHONE (OUTPATIENT)
Dept: PULMONOLOGY | Facility: CLINIC | Age: 74
End: 2019-01-23

## 2019-01-23 NOTE — TELEPHONE ENCOUNTER
Pharmacist states that BREO is contraindicated for patient's diagnosis of deteriorating asthma.   Moderate persistent asthma without complication diagnosis given to pharmacist

## 2019-01-23 NOTE — TELEPHONE ENCOUNTER
----- Message from Valencia Herring sent at 1/23/2019  9:13 AM CST -----  Contact: Eric/ [facility] [phone]  States that she is calling to speak to nurse regarding a contraindication on the breo ellipta. Please call back at [phone]//thank you acc

## 2019-03-11 ENCOUNTER — OFFICE VISIT (OUTPATIENT)
Dept: PODIATRY | Facility: CLINIC | Age: 74
End: 2019-03-11
Payer: MEDICARE

## 2019-03-11 VITALS
DIASTOLIC BLOOD PRESSURE: 68 MMHG | SYSTOLIC BLOOD PRESSURE: 116 MMHG | BODY MASS INDEX: 51.23 KG/M2 | HEART RATE: 75 BPM | HEIGHT: 60 IN | WEIGHT: 260.94 LBS

## 2019-03-11 DIAGNOSIS — B35.1 ONYCHOMYCOSIS: ICD-10-CM

## 2019-03-11 DIAGNOSIS — E11.9 DIABETES MELLITUS WITHOUT COMPLICATION: Primary | ICD-10-CM

## 2019-03-11 DIAGNOSIS — E66.01 MORBID OBESITY WITH BMI OF 50.0-59.9, ADULT: Chronic | ICD-10-CM

## 2019-03-11 DIAGNOSIS — L85.3 XEROSIS CUTIS: ICD-10-CM

## 2019-03-11 PROCEDURE — 1101F PT FALLS ASSESS-DOCD LE1/YR: CPT | Mod: CPTII,S$GLB,, | Performed by: PODIATRIST

## 2019-03-11 PROCEDURE — 99203 PR OFFICE/OUTPT VISIT, NEW, LEVL III, 30-44 MIN: ICD-10-PCS | Mod: S$GLB,,, | Performed by: PODIATRIST

## 2019-03-11 PROCEDURE — 1101F PR PT FALLS ASSESS DOC 0-1 FALLS W/OUT INJ PAST YR: ICD-10-PCS | Mod: CPTII,S$GLB,, | Performed by: PODIATRIST

## 2019-03-11 PROCEDURE — 3078F DIAST BP <80 MM HG: CPT | Mod: CPTII,S$GLB,, | Performed by: PODIATRIST

## 2019-03-11 PROCEDURE — 3074F PR MOST RECENT SYSTOLIC BLOOD PRESSURE < 130 MM HG: ICD-10-PCS | Mod: CPTII,S$GLB,, | Performed by: PODIATRIST

## 2019-03-11 PROCEDURE — 3078F PR MOST RECENT DIASTOLIC BLOOD PRESSURE < 80 MM HG: ICD-10-PCS | Mod: CPTII,S$GLB,, | Performed by: PODIATRIST

## 2019-03-11 PROCEDURE — 99203 OFFICE O/P NEW LOW 30 MIN: CPT | Mod: S$GLB,,, | Performed by: PODIATRIST

## 2019-03-11 PROCEDURE — 99999 PR PBB SHADOW E&M-EST. PATIENT-LVL III: ICD-10-PCS | Mod: PBBFAC,,, | Performed by: PODIATRIST

## 2019-03-11 PROCEDURE — 3074F SYST BP LT 130 MM HG: CPT | Mod: CPTII,S$GLB,, | Performed by: PODIATRIST

## 2019-03-11 PROCEDURE — 99999 PR PBB SHADOW E&M-EST. PATIENT-LVL III: CPT | Mod: PBBFAC,,, | Performed by: PODIATRIST

## 2019-03-11 RX ORDER — BUDESONIDE AND FORMOTEROL FUMARATE DIHYDRATE 160; 4.5 UG/1; UG/1
AEROSOL RESPIRATORY (INHALATION)
Refills: 9 | COMMUNITY
Start: 2019-01-24 | End: 2019-07-01 | Stop reason: SDUPTHER

## 2019-03-11 NOTE — PROGRESS NOTES
Subjective:       Patient ID: Saira العراقي is a 73 y.o. female.    Chief Complaint: Nail Care (Diabetic foot care, rate pain 0/10, ambulatory with assistance, wears flat shoes, Diabetic pt, PCP Dr. Jesus)      HPI: Saira العراقي presents to the office today, under referral by their Primary Care Provider, Christopher Jesus MD, for her annual diabetic foot assessment and risk evalution Patient is a DMII. Patient states venous insufficiency. This patient last saw his/her primary care provider, several weeks ago. Patient does state an abundant amount of xerotic and dry skin to bilateral lower extremity.    No results found for: HGBA1C.    Review of patient's allergies indicates:   Allergen Reactions    Ace inhibitors Anaphylaxis    Ultram [tramadol] Anaphylaxis       Past Medical History:   Diagnosis Date    Arthritis     Asthma     COPD (chronic obstructive pulmonary disease)     Diabetes mellitus     Encounter for blood transfusion     Hypertension     Lung disease     Sleep apnea        Family History   Problem Relation Age of Onset    Cancer Mother     Diabetes Mother     Hypertension Mother     Cancer Father     Hypertension Father     Heart failure Maternal Grandfather     Heart failure Son        Social History     Socioeconomic History    Marital status:      Spouse name: Not on file    Number of children: Not on file    Years of education: Not on file    Highest education level: Not on file   Social Needs    Financial resource strain: Not on file    Food insecurity - worry: Not on file    Food insecurity - inability: Not on file    Transportation needs - medical: Not on file    Transportation needs - non-medical: Not on file   Occupational History    Not on file   Tobacco Use    Smoking status: Former Smoker     Packs/day: 1.00     Years: 50.00     Pack years: 50.00     Last attempt to quit: 2013     Years since quittin.2    Smokeless tobacco: Never Used    Substance and Sexual Activity    Alcohol use: No    Drug use: No    Sexual activity: Not on file   Other Topics Concern    Not on file   Social History Narrative    Not on file       Past Surgical History:   Procedure Laterality Date    APPENDECTOMY      ARTHROSCOPY SHOULDER WITH SLAP REPAIR Right 9/23/2015    [facility]    ARTHROSCOPY-SHOULDER EXCISION DISTAL CLAVICLE Right 9/23/2015    [facility] OR    ARTHROSCOPY-SHOULDER-RIGHT Right 9/23/2015    [facility] OR    CARDIAC SURGERY      valve replacement 12/2014    CATARACT EXTRACTION W/ INTRAOCULAR LENS  IMPLANT, BILATERAL      CHOLECYSTECTOMY      CYST REMOVAL      HERNIA REPAIR      left, 3/2015    HYSTERECTOMY      KNEE ARTHROPLASTY      shoulder         Review of Systems   Constitutional: Negative for chills, fatigue and fever.   HENT: Negative for hearing loss.    Eyes: Negative for photophobia and visual disturbance.   Respiratory: Negative for cough, chest tightness, shortness of breath and wheezing.    Cardiovascular: Positive for leg swelling. Negative for chest pain and palpitations.   Gastrointestinal: Negative for constipation, diarrhea, nausea and vomiting.   Endocrine: Negative for cold intolerance and heat intolerance.   Genitourinary: Negative for flank pain.   Musculoskeletal: Positive for arthralgias, back pain and gait problem. Negative for neck pain and neck stiffness.   Skin:        Xerosis and calluses   Neurological: Negative for light-headedness, numbness and headaches.   Psychiatric/Behavioral: Negative for sleep disturbance.         Objective:   /68 (BP Location: Right arm, Patient Position: Lying, BP Method: Large (Automatic))   Pulse 75   Ht 5' (1.524 m)   Wt 118.3 kg (260 lb 14.6 oz)   BMI 50.96 kg/m²     LOWER EXTREMITY PHYSICAL EXAMINATION  ORTHOPEDIC: Manual Muscle Testing is 5/5 in all planes on the left  and right, without pains, with and without resistance. No pains to palpation of the medial or lateral ankle ligaments. No discomfort to palpation of the posterior tibial tendon, peroneal tendon, Achilles tendon or the anterior ankle tendons.  Pes planus foot type is noted. No pain upon range of motion of the midfoot or hindfoot joints. No crepitus upon range of motion and midfoot or hindfoot joints. No ankle pathology is noted. Gait pattern is non-antalgic.    VASCULAR: Hair growth is sparse on the left and right dorsal foot and at the digits. The left dorsalis pedis pulse is 2/4 and on the right is 2/4, and the left posterior tibial pulse is 1/4 and is 1/4 on the right. Varicosities are absent. Spider veins are noted to the bilateral lower extremity. Warm to warm, proximal to distal. Capillary refill time is within normal limits and less  than 3 seconds.     NEUROLOGY: Proprioception is intact. Sensation to light touch is intact. Protective sensation is intact via 5.07 [place] Dennys monofilament. Straight leg raise is negative. Negative Tinel's Sign and negative Valleix Sign. No neurological sensations with compression of the area of Alfaro's Nerve in the area of the Abductor Hallucis muscle belly. Upon palpation of the interspaces, there are no neurological sensations stated that radiate proximal or distal. Upon compression of the metatarsal heads from medial to lateral, no neurological sensations or symptoms are stated. Deep tendon reflexes to the lower extremity are WNL.    DERMATOLOGY: Skin is supple, moist, dry, intact and xerotic.  Substantial xerosis and hyperkeratosis appreciated.  Some heel fissuring is noted. There are nail changes which are consistent with onychomycosis on the left and right foot. On the left and the right foot, the 1st and 5th toe nails are thickened, are dystrophic, with yellow discoloration, and with malodorous subungual debris. These thickened and dystrophic nails are painful to  touch and palpation. The remaining nail plates are elongated, and are not suggestive of onychomycosis.     Assessment:     1. Diabetes mellitus without complication    2. Morbid obesity with BMI of 50.0-59.9, adult    3. Xerosis cutis    4. Onychomycosis        Plan:     Diabetes mellitus without complication  I counseled the patient on his/her Diabetic Mellitus regarding today's clinical examination and objection findings. We did also discuss recent medication changes, pertinent labs and imaging evaluations and other medical consultation notes and progress notes. Greater than 50% of this visit was spent on counseling and coordination of care. Greater than 20 minutes of this appt. was spent on education about the diabetic foot, in relation to PVD and/or neuropathy, and the prevention of limb loss.    Shoe gear is inspected and wear and proper fit/type. Patient is reminded of the importance of good nutrition and blood sugar control to help prevent podiatric complications of diabetes. Patient instructed on proper foot hygeine. We discussed wearing proper shoe gear, daily foot inspections, never walking without protective shoe gear, never putting sharp instruments to feet.  Patient  will continue to monitor the areas daily, inspect feet, wear protective shoe gear when ambulatory, moisturizer to maintain skin integrity.     Patient's DMII is managed by her PCP, Christopher Jesus MD.     Morbid obesity with BMI of 50.0-59.9, adult  Patient is counseled and reminded concerning the importance of good nutrition and healthy eating habits, which may include blood sugar control, to prevent and/or help podiatric foot and ankle complications.    Xerosis cutis  Recommend twice to 3 times daily topical emollient. Did discuss with the patient concerning dry and thin skin in relation to complications due to diabetes.    Patient will purchase OTC Urea 40% and use BID or TID.     Onychomycosis  The onychomycotic nail plates, as  outlined above, are sharply debrided with double action nail nipper, and/or with the assistance of a mechanical rotary nabeel, with removal of all offending nail and nail border(s), for reduction of pains. Nails are reduced in terms of length, width and girth with removal of subungual debris to facilitate pain free weight bearing and ambulation. The elongated nails as outlined in the objective portion of this note, were trimmed to appropriate length, with a double action nail nipper, for alleviation/reduction of pains as well. Follow up in approx. 3-4 months.    Patient does not meet the qualifications for, or have the class findings necessary for routine foot care. There is no lack of or diminished sensation and he/she has no significant findings of PVD to the B/L LE. At any follow-up appointment concerning routine foot care, patient will be PROC-B, and will be required to pay for services.  This fact is explained to the patient and/or any family who is present at today's appt.      Protective Sensation (w/ 10 gram monofilament):  Right: Intact  Left: Intact    Visual Inspection:  Callus -  Bilateral, Dry Skin -  Bilateral and Onychomycosis -  Bilateral    Pedal Pulses:   Right: Present  Left: Present    Posterior tibialis:   Right:Diminshed  Left: Diminshed              Future Appointments   Date Time Provider Department Center   8/12/2019 10:40 AM [facility]

## 2019-06-25 ENCOUNTER — TELEPHONE (OUTPATIENT)
Dept: PODIATRY | Facility: CLINIC | Age: 74
End: 2019-06-25

## 2019-06-25 NOTE — TELEPHONE ENCOUNTER
Spoke with pt and scheduled her an appointment to see Dr. Waterman for diabetic shoes.  Pt understood and call ended pleasantly.     ----- Message from Brooke Gotti sent at 6/25/2019  3:16 PM CDT -----  Contact: [phone]  Would  Like to  Consult  with the nurse, patient was told that's she is supposed to have some diabetics shoe and and would like to get some information on how to go about getting them, patient states that's she needs them, please call back at [phone], thanks sj

## 2019-07-01 RX ORDER — BUDESONIDE AND FORMOTEROL FUMARATE DIHYDRATE 160; 4.5 UG/1; UG/1
AEROSOL RESPIRATORY (INHALATION)
Qty: 10.2 INHALER | Refills: 9 | Status: SHIPPED | OUTPATIENT
Start: 2019-07-01 | End: 2019-07-11 | Stop reason: SDUPTHER

## 2019-07-08 ENCOUNTER — TELEPHONE (OUTPATIENT)
Dept: PULMONOLOGY | Facility: CLINIC | Age: 74
End: 2019-07-08

## 2019-07-08 NOTE — TELEPHONE ENCOUNTER
----- Message from Heike Strange sent at 7/8/2019 11:27 AM CDT -----  Contact: marian from, [employer]   States she's calling rg having some questions rg pt's meds / Symbicort and brio the pt is taking and wants to know if the pt is supposed to be on both or not and can be reached at [phone]//thanks/dbw

## 2019-07-11 ENCOUNTER — OFFICE VISIT (OUTPATIENT)
Dept: PODIATRY | Facility: CLINIC | Age: 74
End: 2019-07-11
Payer: MEDICARE

## 2019-07-11 VITALS
WEIGHT: 263.88 LBS | SYSTOLIC BLOOD PRESSURE: 121 MMHG | HEART RATE: 65 BPM | DIASTOLIC BLOOD PRESSURE: 68 MMHG | BODY MASS INDEX: 51.8 KG/M2 | HEIGHT: 60 IN

## 2019-07-11 DIAGNOSIS — E11.9 ENCOUNTER FOR COMPREHENSIVE DIABETIC FOOT EXAMINATION, TYPE 2 DIABETES MELLITUS: Primary | ICD-10-CM

## 2019-07-11 DIAGNOSIS — I87.2 VENOUS INSUFFICIENCY OF BOTH LOWER EXTREMITIES: ICD-10-CM

## 2019-07-11 DIAGNOSIS — E66.01 MORBID OBESITY WITH BMI OF 50.0-59.9, ADULT: Chronic | ICD-10-CM

## 2019-07-11 DIAGNOSIS — M24.571 CONTRACTURE, RIGHT ANKLE: ICD-10-CM

## 2019-07-11 DIAGNOSIS — E11.9 DIABETES MELLITUS WITHOUT COMPLICATION: ICD-10-CM

## 2019-07-11 DIAGNOSIS — M21.41 PES PLANUS OF BOTH FEET: ICD-10-CM

## 2019-07-11 DIAGNOSIS — M21.42 PES PLANUS OF BOTH FEET: ICD-10-CM

## 2019-07-11 DIAGNOSIS — M24.572 CONTRACTURE, LEFT ANKLE: ICD-10-CM

## 2019-07-11 PROCEDURE — 1101F PR PT FALLS ASSESS DOC 0-1 FALLS W/OUT INJ PAST YR: ICD-10-PCS | Mod: CPTII,S$GLB,, | Performed by: PODIATRIST

## 2019-07-11 PROCEDURE — 99999 PR PBB SHADOW E&M-EST. PATIENT-LVL III: ICD-10-PCS | Mod: PBBFAC,,, | Performed by: PODIATRIST

## 2019-07-11 PROCEDURE — 99999 PR PBB SHADOW E&M-EST. PATIENT-LVL III: CPT | Mod: PBBFAC,,, | Performed by: PODIATRIST

## 2019-07-11 PROCEDURE — 3074F SYST BP LT 130 MM HG: CPT | Mod: CPTII,S$GLB,, | Performed by: PODIATRIST

## 2019-07-11 PROCEDURE — 3078F PR MOST RECENT DIASTOLIC BLOOD PRESSURE < 80 MM HG: ICD-10-PCS | Mod: CPTII,S$GLB,, | Performed by: PODIATRIST

## 2019-07-11 PROCEDURE — 3078F DIAST BP <80 MM HG: CPT | Mod: CPTII,S$GLB,, | Performed by: PODIATRIST

## 2019-07-11 PROCEDURE — 3074F PR MOST RECENT SYSTOLIC BLOOD PRESSURE < 130 MM HG: ICD-10-PCS | Mod: CPTII,S$GLB,, | Performed by: PODIATRIST

## 2019-07-11 PROCEDURE — 99214 OFFICE O/P EST MOD 30 MIN: CPT | Mod: S$GLB,,, | Performed by: PODIATRIST

## 2019-07-11 PROCEDURE — 99214 PR OFFICE/OUTPT VISIT, EST, LEVL IV, 30-39 MIN: ICD-10-PCS | Mod: S$GLB,,, | Performed by: PODIATRIST

## 2019-07-11 PROCEDURE — 1101F PT FALLS ASSESS-DOCD LE1/YR: CPT | Mod: CPTII,S$GLB,, | Performed by: PODIATRIST

## 2019-07-11 RX ORDER — POTASSIUM CHLORIDE 20 MEQ/1
1 TABLET, EXTENDED RELEASE ORAL
COMMUNITY
Start: 2016-04-09 | End: 2020-02-19 | Stop reason: SDUPTHER

## 2019-07-11 RX ORDER — HYDROCODONE BITARTRATE AND ACETAMINOPHEN 10; 325 MG/1; MG/1
1 TABLET ORAL DAILY PRN
COMMUNITY
Start: 2019-05-09

## 2019-07-11 RX ORDER — MONTELUKAST SODIUM 10 MG/1
10 TABLET ORAL
COMMUNITY
Start: 2019-01-18 | End: 2020-02-19

## 2019-07-11 RX ORDER — ALENDRONATE SODIUM 70 MG/1
70 TABLET ORAL WEEKLY
Refills: 2 | COMMUNITY
Start: 2019-07-01

## 2019-07-11 RX ORDER — FLUTICASONE FUROATE AND VILANTEROL 200; 25 UG/1; UG/1
1 POWDER RESPIRATORY (INHALATION)
COMMUNITY
Start: 2019-01-18 | End: 2019-07-18 | Stop reason: SDUPTHER

## 2019-07-11 NOTE — PROGRESS NOTES
Subjective:       Patient ID: Saira العراقي is a 73 y.o. female.    Chief Complaint: Diabetes Mellitus (pt requesting diabetic shoes; pt reports no pain in feet at present; PCP: Dr. Jesus unknown last visit. )      HPI: Saira العراقي presents to the office today, under referral by their Primary Care Provider, Christopher Jesus MD, for her annual diabetic foot assessment and risk evalution Patient is a DMII. Patient states venous insufficiency. This patient last saw his/her primary care provider, several weeks ago.  Patient presents this afternoon to have diabetic shoes prescribed.  Patient is ambulatory with assistance of a rolling walker.    No results found for: HGBA1C.    Review of patient's allergies indicates:   Allergen Reactions    Ace inhibitors Anaphylaxis    Ultram [tramadol] Anaphylaxis       Past Medical History:   Diagnosis Date    Arthritis     Asthma     COPD (chronic obstructive pulmonary disease)     Diabetes mellitus     Encounter for blood transfusion     Hypertension     Lung disease     Sleep apnea        Family History   Problem Relation Age of Onset    Cancer Mother     Diabetes Mother     Hypertension Mother     Cancer Father     Hypertension Father     Heart failure Maternal Grandfather     Heart failure Son        Social History     Socioeconomic History    Marital status:      Spouse name: Not on file    Number of children: Not on file    Years of education: Not on file    Highest education level: Not on file   Occupational History    Not on file   Social Needs    Financial resource strain: Not on file    Food insecurity:     Worry: Not on file     Inability: Not on file    Transportation needs:     Medical: Not on file     Non-medical: Not on file   Tobacco Use    Smoking status: Former Smoker     Packs/day: 1.00     Years: 50.00     Pack years: 50.00     Last attempt to quit: 2013     Years since quittin.5    Smokeless tobacco: Never  Used   Substance and Sexual Activity    Alcohol use: No    Drug use: No    Sexual activity: Not on file   Lifestyle    Physical activity:     Days per week: Not on file     Minutes per session: Not on file    Stress: Not on file   Relationships    Social connections:     Talks on phone: Not on file     Gets together: Not on file     Attends [employer] service: Not on file     Active member of club or organization: Not on file     Attends meetings of clubs or organizations: Not on file     Relationship status: Not on file   Other Topics Concern    Not on file   Social History Narrative    Not on file       Past Surgical History:   Procedure Laterality Date    APPENDECTOMY      ARTHROSCOPY SHOULDER WITH SLAP REPAIR Right 9/23/2015    Performed [facility] OR    ARTHROSCOPY-SHOULDER EXCISION DISTAL CLAVICLE Right 9/23/2015    [facility] OR    ARTHROSCOPY-SHOULDER-RIGHT Right 9/23/2015    [facility] OR    CARDIAC SURGERY      valve replacement 12/2014    CATARACT EXTRACTION W/ INTRAOCULAR LENS  IMPLANT, BILATERAL      CHOLECYSTECTOMY      CYST REMOVAL      HERNIA REPAIR      left, 3/2015    HYSTERECTOMY      KNEE ARTHROPLASTY      shoulder         Review of Systems   Constitutional: Negative for chills, fatigue and fever.   HENT: Negative for hearing loss.    Eyes: Negative for photophobia and visual disturbance.   Respiratory: Negative for cough, chest tightness, shortness of breath and wheezing.    Cardiovascular: Positive for leg swelling. Negative for chest pain and palpitations.   Gastrointestinal: Negative for constipation, diarrhea, nausea and vomiting.   Endocrine: Negative for cold intolerance and heat intolerance.   Genitourinary: Negative for flank pain.   Musculoskeletal: Positive for arthralgias. Negative for neck pain and neck stiffness.   Skin: Negative for wound.   Neurological: Negative for  light-headedness and headaches.   Psychiatric/Behavioral: Negative for sleep disturbance.         Objective:   /68 (BP Location: Left arm, Patient Position: Sitting)   Pulse 65   Ht 5' (1.524 m)   Wt 119.7 kg (263 lb 14.3 oz)   BMI 51.54 kg/m²     LOWER EXTREMITY PHYSICAL EXAMINATION    ORTHOPEDIC: Manual Muscle Testing is 5/5 in all planes on the left and right, without pains, with and without resistance. No pains to palpation of the medial or lateral ankle ligaments. No discomfort to palpation of the posterior tibial tendon, peroneal tendon, Achilles tendon or the anterior ankle tendons.  Pes planus foot type is noted. No pain upon range of motion of the midfoot or hindfoot joints. No crepitus upon range of motion and midfoot or hindfoot joints. No ankle pathology is noted. Gait pattern is non-antalgic.    VASCULAR: Warm to warm, proximal to distal. Capillary refill time is within normal limits and less  than 3 seconds. Hair growth is sparse on the left and right dorsal foot and at the digits. The left dorsalis pedis pulse is 1/4 and on the right is 1/4, and the left posterior tibial pulse is 1/4 and is 1/4 on the right.  Substantial edema is noted to the bilateral lower leg, ankle and foot. Edema is nonpitting.      NEUROLOGY: Proprioception is intact. Sensation to light touch is intact. Protective sensation is intact via 5.07 [place] Dennys monofilament. Straight leg raise is negative. Negative Tinel's Sign and negative Valleix Sign. No neurological sensations with compression of the area of Alfaro's Nerve in the area of the Abductor Hallucis muscle belly. Upon palpation of the interspaces, there are no neurological sensations stated that radiate proximal or distal. Upon compression of the metatarsal heads from medial to lateral, no neurological sensations or symptoms are stated. Deep tendon reflexes to the lower extremity are WNL.    DERMATOLOGY: Skin is supple, moist, dry and intact.      Assessment:     1. Encounter for comprehensive diabetic foot examination, type 2 diabetes mellitus    2. Diabetes mellitus without complication    3. Venous insufficiency of both lower extremities    4. Morbid obesity with BMI of 50.0-59.9, adult    5. Pes planus of both feet    6. Contracture, left ankle    7. Contracture, right ankle        Plan:     Encounter for comprehensive diabetic foot examination, type 2 diabetes mellitus  Diabetes mellitus without complication  -     DIABETIC SHOES FOR HOME USE    I counseled the patient on his/her Diabetic Mellitus regarding today's clinical examination and objection findings. We did also discuss recent medication changes, pertinent labs and imaging evaluations and other medical consultation notes and progress notes. Greater than 50% of this visit was spent on counseling and coordination of care. Greater than 20 minutes of this appt. was spent on education about the diabetic foot, in relation to PVD and/or neuropathy, and the prevention of limb loss.     Shoe gear is inspected and wear and proper fit/type. Patient is reminded of the importance of good nutrition and blood sugar control to help prevent podiatric complications of diabetes. Patient instructed on proper foot hygeine. We discussed wearing proper shoe gear, daily foot inspections, never walking without protective shoe gear, never putting sharp instruments to feet.  Patient  will continue to monitor the areas daily, inspect feet, wear protective shoe gear when ambulatory, moisturizer to maintain skin integrity.     Patient's DMI/DMII is managed by Primary Care Provider and/or Endocrinology Advanced Practice Provider. As per the most recent glycohemoglobin level, this patient is at goal.    Venous insufficiency of both lower extremities  Did discuss possible initiation and/or continuation of lower extremity compression therapy (stockings).  Recommend continue w/ limb elevation.  Consider initiation oral  diuretic if no overt contraindication.  I encouraged the patient to follow-up and discuss with his/her PCP.     Morbid obesity with BMI of 50.0-59.9, adult  Patient is counseled and reminded concerning the importance of good nutrition and healthy eating habits, which may include blood sugar control, to prevent and/or help podiatric foot and ankle complications.  Encounter for comprehensive diabetic foot examination, type 2 diabetes mellitus        Protective Sensation (w/ 10 gram monofilament):  Right: Intact  Left: Intact    Visual Inspection:  Normal -  Bilateral    Pedal Pulses:   Right: Diminshed  Left: Diminshed    Posterior tibialis:   Right:Diminshed  Left: Diminshed            Future Appointments   Date Time Provider Department Center   8/12/2019 10:40 AM Cassius Mazariegos MD [facility]

## 2019-07-18 DIAGNOSIS — J45.40 MODERATE PERSISTENT ASTHMA WITHOUT COMPLICATION: Primary | ICD-10-CM

## 2019-07-18 RX ORDER — FLUTICASONE FUROATE AND VILANTEROL 200; 25 UG/1; UG/1
1 POWDER RESPIRATORY (INHALATION) DAILY
Qty: 60 EACH | Refills: 11 | Status: SHIPPED | OUTPATIENT
Start: 2019-07-18 | End: 2019-10-09 | Stop reason: SDUPTHER

## 2019-07-18 NOTE — TELEPHONE ENCOUNTER
----- Message from Brooke Gotti sent at 7/18/2019  1:30 PM CDT -----  Contact: [phone]  Would like to consult with the nurse, patient would like to get a refill on her  medication, please call back at [phone], thanks sj  .Type:  RX Refill Request    Who Called:  Ms العراقي  Refill or New Rx:refill  RX Name and Strength:brel  How is the patient currently taking it? (ex. 1XDay):once a day  Is this a 30 day or 90 day RX:  Preferred Pharmacy with phone number:.  [facility] [address] [facility] [address] [facility]  [address] [address]  Phone: [phone] Fax: [phone]      Local or Mail Order: local  Ordering Provider:Dr Mazariegos  Would the patient rather a call back or a response via [facility]? callback  Best Call Back Number:[phone]  Additional Information:  Patient would like for the nurse to check to see if she needs more refill on other medication,

## 2019-08-12 ENCOUNTER — OFFICE VISIT (OUTPATIENT)
Dept: PULMONOLOGY | Facility: CLINIC | Age: 74
End: 2019-08-12
Payer: MEDICARE

## 2019-08-12 VITALS
HEIGHT: 60 IN | OXYGEN SATURATION: 95 % | DIASTOLIC BLOOD PRESSURE: 80 MMHG | HEART RATE: 80 BPM | WEIGHT: 267.44 LBS | SYSTOLIC BLOOD PRESSURE: 132 MMHG | BODY MASS INDEX: 52.51 KG/M2 | RESPIRATION RATE: 18 BRPM

## 2019-08-12 DIAGNOSIS — G47.33 OSA ON CPAP: Chronic | ICD-10-CM

## 2019-08-12 DIAGNOSIS — E66.01 MORBID OBESITY WITH BMI OF 50.0-59.9, ADULT: Chronic | ICD-10-CM

## 2019-08-12 DIAGNOSIS — J98.4 CRLD (CHRONIC RESTRICTIVE LUNG DISEASE): Chronic | ICD-10-CM

## 2019-08-12 DIAGNOSIS — J45.40 MODERATE PERSISTENT ASTHMA WITHOUT COMPLICATION: Primary | Chronic | ICD-10-CM

## 2019-08-12 DIAGNOSIS — R01.1 CARDIAC MURMUR, UNSPECIFIED: Chronic | ICD-10-CM

## 2019-08-12 DIAGNOSIS — I34.0 CARDIAC MURMUR DUE TO MITRAL VALVE DISORDER: ICD-10-CM

## 2019-08-12 PROCEDURE — 1101F PR PT FALLS ASSESS DOC 0-1 FALLS W/OUT INJ PAST YR: ICD-10-PCS | Mod: CPTII,S$GLB,, | Performed by: INTERNAL MEDICINE

## 2019-08-12 PROCEDURE — 3079F PR MOST RECENT DIASTOLIC BLOOD PRESSURE 80-89 MM HG: ICD-10-PCS | Mod: CPTII,S$GLB,, | Performed by: INTERNAL MEDICINE

## 2019-08-12 PROCEDURE — 3079F DIAST BP 80-89 MM HG: CPT | Mod: CPTII,S$GLB,, | Performed by: INTERNAL MEDICINE

## 2019-08-12 PROCEDURE — 99999 PR PBB SHADOW E&M-EST. PATIENT-LVL III: ICD-10-PCS | Mod: PBBFAC,,, | Performed by: INTERNAL MEDICINE

## 2019-08-12 PROCEDURE — 3075F PR MOST RECENT SYSTOLIC BLOOD PRESS GE 130-139MM HG: ICD-10-PCS | Mod: CPTII,S$GLB,, | Performed by: INTERNAL MEDICINE

## 2019-08-12 PROCEDURE — 99214 PR OFFICE/OUTPT VISIT, EST, LEVL IV, 30-39 MIN: ICD-10-PCS | Mod: S$GLB,,, | Performed by: INTERNAL MEDICINE

## 2019-08-12 PROCEDURE — 3075F SYST BP GE 130 - 139MM HG: CPT | Mod: CPTII,S$GLB,, | Performed by: INTERNAL MEDICINE

## 2019-08-12 PROCEDURE — 1101F PT FALLS ASSESS-DOCD LE1/YR: CPT | Mod: CPTII,S$GLB,, | Performed by: INTERNAL MEDICINE

## 2019-08-12 PROCEDURE — 99214 OFFICE O/P EST MOD 30 MIN: CPT | Mod: S$GLB,,, | Performed by: INTERNAL MEDICINE

## 2019-08-12 PROCEDURE — 99999 PR PBB SHADOW E&M-EST. PATIENT-LVL III: CPT | Mod: PBBFAC,,, | Performed by: INTERNAL MEDICINE

## 2019-08-12 NOTE — ASSESSMENT & PLAN NOTE
Asthma ROS: Taking medications as instructed, no medication side effects noted, no significant ongoing wheezing or shortness of breath, using bronchodilator MDI less than twice a week.   New concerns: None.   Exam: appears well, vitals normal, no respiratory distress, acyanotic, normal RR, chest clear, no wheezing, crepitations, rhonchi, normal symmetric air entry.   Assessment:  Asthma well controlled.   Plan:  BREO,SINGULAIR, VENTOLIN and PROVENTIL. Current treatment plan is effective, no change in therapy, reviewed use of rescue vs controlling agents, oral and inhaled meds and potential side effects.

## 2019-08-12 NOTE — PROGRESS NOTES
Subjective:      Established patient    Patient ID: Saira اعلراقي is a 74 y.o. female.  Patient Active Problem List   Diagnosis    Right knee pain    Moderate persistent asthma without complication    JACQUELINE on CPAP    Arthritis of right shoulder region    Right shoulder tendonitis    Carpal tunnel syndrome    Right carpal tunnel syndrome    Unspecified disorder of synovium, tendon, and bursa    Acromioclavicular (joint) (ligament) sprain    Impingement syndrome of right shoulder    Rotator cuff tendinitis    Morbid obesity with BMI of 50.0-59.9, adult    Arthritis of left shoulder region    Impingement syndrome of left shoulder    Impaired physical mobility    GINO (acute kidney injury)    Synovitis of left knee    Numbness and tingling    Left knee pain    Left shoulder pain    Carpal tunnel syndrome on both sides    Preop testing    CRLD (chronic restrictive lung disease)    Cardiac murmur, unspecified       Problem list has been reviewed.    Chief Complaint: Sleep Apnea and Asthma        Patients reports NYHA II dyspnea    The patient does not have currently have symptoms / an exacerbation.      Her current respiratory therapy regimen is SINGULAIR, BREO, VENTOLIN and PROVENTIL which provides  relief. She is adherent with her regimen.       REGIMEN Morning Evening   NEBULIZER PROVENTIL NEBS PROVENTIL NEBS   Controller  BREO -   RESCUE - VENTOLIN PRN PRN       ASTHMA IS SOMEWHAT CONTROLLED.     Answers for HPI/ROS submitted by the patient on 8/12/2019   Asthma  In the past 4 weeks, how much of the time did your asthma keep you from getting as much done at work, school, or at home?: a little of the time  During the past 4 weeks, how often have you had shortness of breath?: more than once a day  During the past 4 weeks, how often did your asthma symptoms (Wheezing, coughing, shortness of breath, chest tightness or pain) wake you up at night or earlier that usual in the morning?: not at  all  During the past 4 weeks, how often have you used your rescue inhaler or nebulizer medication (such as albuterol)?: 1 or 2 times per day  How would you rate your asthma control during the past 4 weeks?: well controlled   : 16    No recent change in breathing.     Current Disease Severity  frequent daytime asthma symptoms.   weekly nighttime asthma symptoms.   Frequency B-agonist use: daily.   Number of urgent/emergent visit in last year: 2 times.  Current limitations in activity from asthma: none.   Number of days of school or work missed in the last month: not applicable.      Asthma Classification (General Symptom Frequency):  Mild Intermittent (< 2 x wk)  Mild Persistent (> 2 x wk, < daily)  Moderate Persistent (daily; almost daily inhaler)  Severe Persistent (continual; limited activities)       Immunization status reviewed and up to date.     She is on CPAP of 13 cms. She did not bring her data card for compliance download.  Patient denies snoring, headaches, excessive daytime sleepiness. She definitely thinks PAP is beneficial to her health and she wants to continue with PAP therapy.       Compliance Summary  7/13/2019 - 8/11/2019 (30 days)  Days with Device Usage 26 days  Days without Device Usage 4 days  Percent Days with Device Usage 86.7%  Cumulative Usage 5 days 5 hrs. 46 mins. 12 secs.  Maximum Usage (1 Day) 7 hrs. 32 mins. 37 secs.  Average Usage (All Days) 4 hrs. 11 mins. 32 secs.  Average Usage (Days Used) 4 hrs. 50 mins. 14 secs.  Minimum Usage (1 Day) 2 hrs. 38 mins. 18 secs.  Percent of Days with Usage >= 4 Hours 60.0%  Percent of Days with Usage < 4 Hours 40.0%  Date Range  Total Blower Time 5 days 14 hrs. 7 mins. 29 secs.    [place] Sleepiness Scale   EPWORTH SLEEPINESS SCALE 8/12/2019 1/18/2019 1/2/2019 2/2/2018 11/17/2015 9/17/2015 5/11/2015   Sitting and reading 3 3 0 1 3 3 0   Watching TV 3 3 3 3 3 3 2   Sitting, inactive in a public place (e.g. a theatre or a meeting) 0 0 0 1 0 0 0   As  a passenger in a car for an hour without a break 0 0 1 0 2 0 0   Lying down to rest in the afternoon when circumstances permit 3 3 3 3 3 2 3   Sitting and talking to someone 1 0 0 0 0 0 0   Sitting quietly after a lunch without alcohol 2 2 3 2 3 2 2   In a car, while stopped for a few minutes in traffic 0 0 0 0 0 0 0   Total score 12 11 10 10 14 10 7        A full  review of systems, past , family  and social histories was performed except as mentioned in the note above, these are non contributory to the main issues discussed today.       Previous Report Reviewed: lab reports, office notes and radiology reports     The following portions of the patient's history were reviewed and updated as appropriate: She  has a past medical history of Arthritis, Asthma, COPD (chronic obstructive pulmonary disease), Diabetes mellitus, Encounter for blood transfusion, Hypertension, Lung disease, and Sleep apnea.  She  has a past surgical history that includes Knee Arthroplasty; shoulder; Cyst Removal; Appendectomy; Hysterectomy; Cholecystectomy; Cardiac surgery; Hernia repair; and Cataract extraction w/ intraocular lens  implant, bilateral.  Her family history includes Cancer in her father and mother; Diabetes in her mother; Heart failure in her maternal grandfather and son; Hypertension in her father and mother.  She  reports that she quit smoking about 5 years ago. She has a 50.00 pack-year smoking history. She has never used smokeless tobacco. She reports that she does not drink alcohol or use drugs.  She has a current medication list which includes the following prescription(s): albuterol, albuterol, alcohol prep pads, alendronate, amlodipine, ammonium lactate, aspirin, atorvastatin, benicar, calcium citrate-vitamin d3 315-200 mg, clever choice blood gluc sys, embrace blood glucose system, fluticasone furoate-vilanterol, furosemide, gabapentin, glucosamine-d3-boswellia serr, hydrocodone-acetaminophen, hydrocodone-acetaminophen,  lancing device with lancets, lidocaine-prilocaine, lite touch lancets, metformin, metolazone, metoprolol tartrate, montelukast, montelukast, nabumetone, omega-3 fatty acids, potassium chloride sa, potassium chloride sa, ranitidine, tiotropium bromide, vitamin e, and diclofenac sodium.  She is allergic to ace inhibitors and ultram [tramadol]..    Review of Systems   Constitutional: Negative for fever, chills and fatigue.   HENT: Positive for rhinorrhea, sinus pressure and congestion. Negative for postnasal drip.    Eyes: Positive for itching.   Respiratory: Positive for shortness of breath and dyspnea on extertion. Negative for cough, hemoptysis, sputum production and wheezing.    Cardiovascular: Positive for leg swelling. Negative for chest pain.   Genitourinary: Negative for difficulty urinating and hematuria.   Musculoskeletal: Positive for arthralgias and back pain.   Skin: Negative for rash.   Gastrointestinal: Negative for nausea, vomiting and abdominal pain.   Neurological: Negative for dizziness, syncope, light-headedness and headaches.   Hematological: Bleeds easily.   Psychiatric/Behavioral: The patient is not nervous/anxious.         Objective:     /80   Pulse 80   Resp 18   Ht 5' (1.524 m)   Wt 121.3 kg (267 lb 6.7 oz)   SpO2 95%   BMI 52.23 kg/m²   Body mass index is 52.23 kg/m².     Physical Exam   Constitutional: She is oriented to person, place, and time. She appears well-developed and well-nourished.   Obese   HENT:   Head: Normocephalic and atraumatic.   Neck: Normal range of motion. Neck supple.   Cardiovascular: Normal rate and regular rhythm. Exam reveals no gallop.   Murmur heard.   Crescendo systolic murmur is present with a grade of 3/6.  Pulmonary/Chest: Effort normal and breath sounds normal.   Abdominal: Soft. Bowel sounds are normal.   Musculoskeletal: Normal range of motion. She exhibits edema.   Neurological: She is alert and oriented to person, place, and time.   Skin: Skin  is warm and dry.   Psychiatric: She has a normal mood and affect.       Personal Diagnostic Review  None pertinent.      Assessment / Plan:       Discussed diagnosis, its evaluation, treatment and usual course. All questions answered.    Problem List Items Addressed This Visit        Pulmonary    Moderate persistent asthma without complication - Primary    Current Assessment & Plan     Asthma ROS: Taking medications as instructed, no medication side effects noted, no significant ongoing wheezing or shortness of breath, using bronchodilator MDI less than twice a week.   New concerns: None.   Exam: appears well, vitals normal, no respiratory distress, acyanotic, normal RR, chest clear, no wheezing, crepitations, rhonchi, normal symmetric air entry.   Assessment:  Asthma well controlled.   Plan:  BREO,SINGULAIR, VENTOLIN and PROVENTIL. Current treatment plan is effective, no change in therapy, reviewed use of rescue vs controlling agents, oral and inhaled meds and potential side effects.         Relevant Orders    Complete PFT with bronchodilator    Stress test, pulmonary    CRLD (chronic restrictive lung disease)    Current Assessment & Plan     CRLD ROS: Restrictive lung disease secondary to body habitus / interstitial disease. taking medications as instructed, no medication side effects noted, no significant ongoing wheezing , using bronchodilator MDI less than twice a week.   New concerns: NONE.   Exam: appears well, vitals normal, no respiratory distress, acyanotic, normal RR.   Assessment:  CRLD stable.   Plan: Current treatment plan is effective, no change in therapy.Weight loss encouraged.           Relevant Orders    X-Ray Chest PA And Lateral       Cardiac/Vascular    Cardiac murmur, unspecified (Chronic)    Current Assessment & Plan     ETIOLOGY UNCLEAR: History of valvular heart disease s/p BIOPROSTHETIC AORTIC VALVE replacement ( 12/31/2014)    EVALUATE with ECHO.         Relevant Orders    2D echo with  color flow doppler and bubble contrast       Endocrine    Morbid obesity with BMI of 50.0-59.9, adult (Chronic)    Current Assessment & Plan     General weight loss/lifestyle modification strategies discussed (elicit support from others; identify saboteurs; non-food rewards, etc).  Diet interventions: low calorie (1000 kCal/d) deficit diet.            Other    JACQUELINE on CPAP (Chronic)    Current Assessment & Plan     Continue CPAP of 13. (ALONZO SOLITARIO)     Discussed therapeutic goals for positive airway pressure therapy(CPAP or BiPAP): Ideal is usage 100% of nights for 6 - 8 hours per night. Minimum usage is 70% of night for at least 4 hours per night used Pateint expressed understanding. All Questions answered.    Complications of untreated sleep apnea discussed with pateint in detail including but not limited to  high blood pressure, heart attack, stroke, obesity,  diabetes and worsening heart failure. Patient voiced understanding.    CPAP supplies,    ONSAT on CPAP.         Relevant Orders    CPAP/BIPAP SUPPLIES    Pulse oximetry overnight            TIME SPENT WITH PATIENT: Time spent: 30 minutes in face to face  discussion concerning diagnosis, prognosis, review of lab and test results, benefits of treatment as well as management of disease, counseling of patient and coordination of care between various health  care providers . Greater than half the time spent was used for coordination of care and counseling of patient.       Follow up in about 6 months (around 2/12/2020) for Asthma, Morbid Obesity, JACQUELINE, Restrictive Lung Disease.

## 2019-08-12 NOTE — ASSESSMENT & PLAN NOTE
Continue CPAP of 13. (ALONZO Davis TASHA)     Discussed therapeutic goals for positive airway pressure therapy(CPAP or BiPAP): Ideal is usage 100% of nights for 6 - 8 hours per night. Minimum usage is 70% of night for at least 4 hours per night used Pateint expressed understanding. All Questions answered.    Complications of untreated sleep apnea discussed with pateint in detail including but not limited to  high blood pressure, heart attack, stroke, obesity,  diabetes and worsening heart failure. Patient voiced understanding.    CPAP supplies,    ONSAT on CPAP.

## 2019-08-12 NOTE — ASSESSMENT & PLAN NOTE
ETIOLOGY UNCLEAR: History of valvular heart disease s/p BIOPROSTHETIC AORTIC VALVE replacement ( 12/31/2014)    EVALUATE with ECHO.

## 2019-08-12 NOTE — PATIENT INSTRUCTIONS
Lung Anatomy  Your lungs take air in to give your body oxygen, which the body needs to work. Your lungs, like all the tissues in your body, are made up of billions of tiny specialized cells. Old lung cells die and are replaced by new, identical lung cells. This natural process helps ensure healthy lungs.    Date Last Reviewed: 11/1/2016  © 7158-0164 [employer]. [address] [place] [address], [place] [address]. All rights reserved. This information is not intended as a substitute for professional medical care. Always follow your healthcare professional's instructions.

## 2019-08-12 NOTE — ASSESSMENT & PLAN NOTE
CRLD ROS: Restrictive lung disease secondary to body habitus / interstitial disease. taking medications as instructed, no medication side effects noted, no significant ongoing wheezing , using bronchodilator MDI less than twice a week.   New concerns: NONE.   Exam: appears well, vitals normal, no respiratory distress, acyanotic, normal RR.   Assessment:  CRLD stable.   Plan: Current treatment plan is effective, no change in therapy.Weight loss encouraged.

## 2019-08-20 ENCOUNTER — CLINICAL SUPPORT (OUTPATIENT)
Dept: PULMONOLOGY | Facility: CLINIC | Age: 74
End: 2019-08-20
Payer: MEDICARE

## 2019-08-20 DIAGNOSIS — G47.33 OSA ON CPAP: Chronic | ICD-10-CM

## 2019-08-20 PROCEDURE — 94762 PR NONINVASV OXYGEN SATUR, CONT: ICD-10-PCS | Mod: S$GLB,,, | Performed by: INTERNAL MEDICINE

## 2019-08-20 PROCEDURE — 94762 N-INVAS EAR/PLS OXIMTRY CONT: CPT | Mod: S$GLB,,, | Performed by: INTERNAL MEDICINE

## 2019-08-22 ENCOUNTER — TELEPHONE (OUTPATIENT)
Dept: PULMONOLOGY | Facility: HOSPITAL | Age: 74
End: 2019-08-22

## 2019-08-22 NOTE — PROCEDURES
[facility]  [address] * [place] [address]  Telephone: [phone]  Test date: 19 Start: 19 23:58:15 Saira العراقي  Doctor: Dr. Mazariegos End: 19 07:27:03 [id number]  Oximetry: Summary Report  Comments: Cpap 16 CMWP/ Room Air  Recording time: 07:28:48 Highest pulse: 98 Highest SpO2: 97%  Excluded samplin:12:48 Lowest pulse: 58 Lowest SpO2: 78%  Total valid samplin:16:00 Mean pulse: 71 Mean SpO2: 91.6%  1 S.D.: 4.1 1 S.D.: 1.8  Time with SpO2<90: 0:43:40, 10.0%  Time with SpO2<80: 0:00:04, 0.0%  Time with SpO2<70: 0:00:00, 0.0%  Time with SpO2<60: 0:00:00, 0.0%  Time with SpO2<89: 0:21:04, 4.8%  Time with SpO2 =>90: 6:32:20, 90.0%  Time with SpO2=>80 & <90: 0:43:36, 10.0%  Time with SpO2=>70 & <80: 0:00:04, 0.0%  Time with SpO2=>60 & <70: 0:00:00, 0.0%  The longest continuous time with saturation <=88 was 00:03:04, which started at  19 01:33:07.  A desaturation event was defined as a decrease of saturation by 4 or more.  No events were excluded due to artifact.  There were 15 desaturation events over 3 minutes duration.  There were 46 desaturation events of less than 3 minutes duration during which:  The mean high was 92.9%. The mean low was 86.8%.  The number of these events that were:  > 0 & <10 seconds: 4 > 0 seconds: 46  =>10 & <20 seconds: 5 =>10 seconds: 42  =>20 & <30 seconds: 1 =>20 seconds: 37  =>30 & <40 seconds: 8 =>30 seconds: 36  =>40 & <50 seconds: 8 =>40 seconds: 28  =>50 & <60 seconds: 4 =>50 seconds: 20  =>60 seconds: 16 =>60 seconds: 16  The mean length of desaturation events that were >=10 sec & <=3 mins was: 57.8 sec.  Desaturation event index (events >=10 sec per sampled hour): 5.8  Desaturation event index (events >= 0 sec per sampled hour): 6.3      PHYSICIAN INTERPRETATION:    OVERNIGHT OXIMETRY REPORT:    Dictated by: Cassius Langston M.D.  Test date: 19  Dictated on: 2019      Comment: This test was performed on CPAP 16  CMWP and room air     A desaturation event was defined as a decrease of saturation by 4 or more.    REPORT SUMMARY  Total recording time: 07:28:48  Excluded samplin:12:48  Total valid samplin:16:00  High pulse: 98 /min  Low pulse: 58 /min    Mean pulse: 71/min    High SpO2: 97%    Low SpO2: 78%    Mean SpO2  91.6 %  Cumulative time with oxygen saturation less than 88% (TC88): 00:21:04 ( 4.8%)      CLINICAL INTERPRETATION   There is  significant nocturnal oxygen desaturation and  Clinical correlation is advised    Medicare Criteria Comments:   Oximetry test results suggest the patient falls under Medicare Group 1 Criteria. ( Arterial oxygen saturation at or below 88% for at least 5 minutes taken during sleep)    Details about Medicare Group Criteria coverage can be found at http://www.cms.hhs.gov/manuals/downloads/

## 2019-08-22 NOTE — TELEPHONE ENCOUNTER
OVERNIGHT OXIMETRY REPORT:    Dictated by: Cassius Langston M.D.  Test date: 19  Dictated on: 2019      Comment: This test was performed on CPAP 16 CMWP and room air     A desaturation event was defined as a decrease of saturation by 4 or more.    REPORT SUMMARY  Total recording time: 07:28:48  Excluded samplin:12:48  Total valid samplin:16:00  High pulse: 98 /min  Low pulse: 58 /min    Mean pulse: 71/min    High SpO2: 97%    Low SpO2: 78%    Mean SpO2  91.6 %  Cumulative time with oxygen saturation less than 88% (TC88): 00:21:04 ( 4.8%)      CLINICAL INTERPRETATION   There is  significant nocturnal oxygen desaturation and  Clinical correlation is advised    Medicare Criteria Comments:   Oximetry test results suggest the patient falls under Medicare Group 1 Criteria. ( Arterial oxygen saturation at or below 88% for at least 5 minutes taken during sleep)    Details about Medicare Group Criteria coverage can be found at http://www.cms.hhs.gov/manuals/downloads/       PLAN:    1. Start nocturnal oxygen supplementation at 2 L /min blended in through CPAP 16 CMWP.   2. Repeat ONSAT on CPAP 16 CMWP with oxygen 2 L /min blended in.

## 2019-09-16 ENCOUNTER — TELEPHONE (OUTPATIENT)
Dept: PULMONOLOGY | Facility: CLINIC | Age: 74
End: 2019-09-16

## 2019-09-16 DIAGNOSIS — G47.33 OSA ON CPAP: Primary | Chronic | ICD-10-CM

## 2019-09-16 NOTE — TELEPHONE ENCOUNTER
----- Message from Bonnie Martinez sent at 9/16/2019  4:04 PM CDT -----  Contact: Pt  Pt states she needs orders for a new C Pap. ([phone])

## 2019-09-16 NOTE — TELEPHONE ENCOUNTER
----- Message from Marybeth Unger sent at 9/16/2019 12:05 PM CDT -----  Contact: self [phone]  Pt would like return call from nurse, pt is wanting to get new CPAP machine.  Please call back at  [phone].  Md Lit

## 2019-09-27 DIAGNOSIS — M25.511 BILATERAL SHOULDER PAIN, UNSPECIFIED CHRONICITY: Primary | ICD-10-CM

## 2019-09-27 DIAGNOSIS — M25.512 BILATERAL SHOULDER PAIN, UNSPECIFIED CHRONICITY: Primary | ICD-10-CM

## 2019-09-27 DIAGNOSIS — R52 PAIN: Primary | ICD-10-CM

## 2019-09-30 ENCOUNTER — OFFICE VISIT (OUTPATIENT)
Dept: ORTHOPEDICS | Facility: CLINIC | Age: 74
End: 2019-09-30
Payer: MEDICARE

## 2019-09-30 ENCOUNTER — HOSPITAL ENCOUNTER (OUTPATIENT)
Dept: RADIOLOGY | Facility: HOSPITAL | Age: 74
Discharge: HOME | End: 2019-09-30
Attending: FAMILY MEDICINE
Payer: MEDICARE

## 2019-09-30 VITALS
HEART RATE: 67 BPM | SYSTOLIC BLOOD PRESSURE: 145 MMHG | DIASTOLIC BLOOD PRESSURE: 65 MMHG | HEIGHT: 60 IN | WEIGHT: 262 LBS | BODY MASS INDEX: 51.44 KG/M2

## 2019-09-30 DIAGNOSIS — J98.4 CRLD (CHRONIC RESTRICTIVE LUNG DISEASE): ICD-10-CM

## 2019-09-30 DIAGNOSIS — E66.01 MORBID OBESITY WITH BMI OF 50.0-59.9, ADULT: ICD-10-CM

## 2019-09-30 DIAGNOSIS — M19.011 PRIMARY OSTEOARTHRITIS OF SHOULDERS, BILATERAL: Primary | ICD-10-CM

## 2019-09-30 DIAGNOSIS — M25.511 BILATERAL SHOULDER PAIN, UNSPECIFIED CHRONICITY: ICD-10-CM

## 2019-09-30 DIAGNOSIS — M75.42 IMPINGEMENT SYNDROME OF LEFT SHOULDER: ICD-10-CM

## 2019-09-30 DIAGNOSIS — G47.33 OSA ON CPAP: ICD-10-CM

## 2019-09-30 DIAGNOSIS — M25.512 BILATERAL SHOULDER PAIN, UNSPECIFIED CHRONICITY: ICD-10-CM

## 2019-09-30 DIAGNOSIS — M75.41 IMPINGEMENT SYNDROME OF RIGHT SHOULDER: ICD-10-CM

## 2019-09-30 DIAGNOSIS — M19.012 PRIMARY OSTEOARTHRITIS OF SHOULDERS, BILATERAL: Primary | ICD-10-CM

## 2019-09-30 PROCEDURE — 73030 X-RAY EXAM OF SHOULDER: CPT | Mod: TC,50

## 2019-09-30 PROCEDURE — 3078F PR MOST RECENT DIASTOLIC BLOOD PRESSURE < 80 MM HG: ICD-10-PCS | Mod: CPTII,S$GLB,, | Performed by: FAMILY MEDICINE

## 2019-09-30 PROCEDURE — 99204 PR OFFICE/OUTPT VISIT, NEW, LEVL IV, 45-59 MIN: ICD-10-PCS | Mod: S$GLB,,, | Performed by: FAMILY MEDICINE

## 2019-09-30 PROCEDURE — 99999 PR PBB SHADOW E&M-EST. PATIENT-LVL III: ICD-10-PCS | Mod: PBBFAC,,, | Performed by: FAMILY MEDICINE

## 2019-09-30 PROCEDURE — 3078F DIAST BP <80 MM HG: CPT | Mod: CPTII,S$GLB,, | Performed by: FAMILY MEDICINE

## 2019-09-30 PROCEDURE — 73030 X-RAY EXAM OF SHOULDER: CPT | Mod: 26,50,, | Performed by: RADIOLOGY

## 2019-09-30 PROCEDURE — 1101F PT FALLS ASSESS-DOCD LE1/YR: CPT | Mod: CPTII,S$GLB,, | Performed by: FAMILY MEDICINE

## 2019-09-30 PROCEDURE — 3077F PR MOST RECENT SYSTOLIC BLOOD PRESSURE >= 140 MM HG: ICD-10-PCS | Mod: CPTII,S$GLB,, | Performed by: FAMILY MEDICINE

## 2019-09-30 PROCEDURE — 3077F SYST BP >= 140 MM HG: CPT | Mod: CPTII,S$GLB,, | Performed by: FAMILY MEDICINE

## 2019-09-30 PROCEDURE — 1101F PR PT FALLS ASSESS DOC 0-1 FALLS W/OUT INJ PAST YR: ICD-10-PCS | Mod: CPTII,S$GLB,, | Performed by: FAMILY MEDICINE

## 2019-09-30 PROCEDURE — 73030 XR SHOULDER COMPLETE 2 OR MORE VIEWS BILATERAL: ICD-10-PCS | Mod: 26,50,, | Performed by: RADIOLOGY

## 2019-09-30 PROCEDURE — 99999 PR PBB SHADOW E&M-EST. PATIENT-LVL III: CPT | Mod: PBBFAC,,, | Performed by: FAMILY MEDICINE

## 2019-09-30 PROCEDURE — 99204 OFFICE O/P NEW MOD 45 MIN: CPT | Mod: S$GLB,,, | Performed by: FAMILY MEDICINE

## 2019-09-30 RX ORDER — FLUTICASONE PROPIONATE 50 MCG
1 SPRAY, SUSPENSION (ML) NASAL DAILY PRN
COMMUNITY
Start: 2019-09-06

## 2019-09-30 NOTE — PROGRESS NOTES
Subjective:     Patient ID: Saira العراقي is a 74 y.o. female.    Chief Complaint: Pain of the Right Shoulder    Patient is a 74-year-old female presents clinic today complaining of bilateral shoulder pain (right worse than left).  Patient states that she has a history of right rotator cuff surgery done by Dr. Teixeira in 2015.  Patient states that her left shoulder is also sore, but not nearly severe is a right.  Patient states that the right has gotten to the point that it is difficult to function.  States that if she rests for a few minutes, she notices the shoulder against a throbbing ache.  Patient states that she had a cortisone injection in her shoulder approximately 1 year ago that was beneficial, but pain return.  Patient states that the chronic pain, decreased strength, and daily limitation has gotten to the point that she is not functioning well and is interfering with her ability to work/live.  Denies any recent injuries, falls, fever, or chills.      Past Medical History:   Diagnosis Date    Arthritis     Asthma     COPD (chronic obstructive pulmonary disease)     Diabetes mellitus     Encounter for blood transfusion     Hypertension     Lung disease     Sleep apnea      Past Surgical History:   Procedure Laterality Date    APPENDECTOMY      CARDIAC SURGERY      valve replacement 12/2014    CATARACT EXTRACTION W/ INTRAOCULAR LENS  IMPLANT, BILATERAL      CHOLECYSTECTOMY      CYST REMOVAL      HERNIA REPAIR      left, 3/2015    HYSTERECTOMY      KNEE ARTHROPLASTY      shoulder       Family History   Problem Relation Age of Onset    Cancer Mother     Diabetes Mother     Hypertension Mother     Cancer Father     Hypertension Father     Heart failure Maternal Grandfather     Heart failure Son      Social History     Socioeconomic History    Marital status:      Spouse name: Not on file    Number of children: Not on file    Years of education: Not on file    Highest  education level: Not on file   Occupational History    Not on file   Social Needs    Financial resource strain: Not on file    Food insecurity:     Worry: Not on file     Inability: Not on file    Transportation needs:     Medical: Not on file     Non-medical: Not on file   Tobacco Use    Smoking status: Former Smoker     Packs/day: 1.00     Years: 50.00     Pack years: 50.00     Last attempt to quit: 2013     Years since quittin.7    Smokeless tobacco: Never Used   Substance and Sexual Activity    Alcohol use: No    Drug use: No    Sexual activity: Not on file   Lifestyle    Physical activity:     Days per week: Not on file     Minutes per session: Not on file    Stress: Not on file   Relationships    Social connections:     Talks on phone: Not on file     Gets together: Not on file     Attends Confucianist service: Not on file     Active member of club or organization: Not on file     Attends meetings of clubs or organizations: Not on file     Relationship status: Not on file   Other Topics Concern    Not on file   Social History Narrative    Not on file     Medication List with Changes/Refills   Current Medications    ALBUTEROL (ACCUNEB) 0.63 MG/3 ML NEBU    INHALE 1 VIAL VIA NEBULIZER EVERY 6 HOURS AS NEEDED    ALBUTEROL (PROVENTIL/VENTOLIN HFA) 90 MCG/ACTUATION INHALER    Inhale 2 puffs into the lungs every 4 (four) hours as needed for Wheezing.    ALCOHOL PREP PADS PADM        ALENDRONATE (FOSAMAX) 70 MG TABLET    Take 70 mg by mouth once a week.    AMLODIPINE (NORVASC) 10 MG TABLET    Take 10 mg by mouth once daily.     AMMONIUM LACTATE 12 % CREA    Apply 1 Applicatorful topically.    ASPIRIN (ECOTRIN) 81 MG EC TABLET    Take 81 mg by mouth once daily.    ATORVASTATIN (LIPITOR) 40 MG TABLET    Take 40 mg by mouth once daily.     BENICAR 40 MG TABLET    Take 40 mg by mouth once daily.     CALCIUM CITRATE-VITAMIN D3 315-200 MG (CITRACAL+D) 315-200 MG-UNIT PER TABLET    Take 1 tablet by  mouth once daily.     CLEVER CHOICE BLOOD GLUC SYS [facility] [place]        DICLOFENAC SODIUM 1 % GEL    Apply 2 g topically 4 (four) times daily.    [employer] BLOOD GLUCOSE SYSTEM STRP        FLUAD [id number], 65 YR UP,,PF, 45 MCG (15 MCG X 3)/0.5 ML SYRG        FLUTICASONE FUROATE-VILANTEROL (BREO ELLIPTA) 200-25 MCG/DOSE DSDV DISKUS INHALER    Inhale 1 puff into the lungs once daily.    FLUTICASONE PROPIONATE (FLONASE) 50 MCG/ACTUATION NASAL SPRAY        FUROSEMIDE (LASIX) 40 MG TABLET    TAKE 1 TABLET BY MOUTH TWICE A DAY    GABAPENTIN (NEURONTIN) 300 MG CAPSULE    Take 300 mg by mouth 2 (two) times daily.     GLUCOSAMINE-D3-BOSWELLIA SERR (OSTEO BI-FLEX, 5-LOXIN,) 1,500-400-100 MG-UNIT-MG TAB    Take 1 tablet by mouth daily as needed.    HYDROCODONE-ACETAMINOPHEN (NORCO)  MG PER TABLET    Take 1 tablet by mouth daily as needed.    HYDROCODONE-ACETAMINOPHEN 10-325MG (NORCO)  MG TAB    Take 1 tablet by mouth every 6 (six) hours as needed.    LANCING DEVICE WITH LANCETS MISC        LIDOCAINE-PRILOCAINE (EMLA) CREAM    as needed.     LITE TOUCH LANCETS 30 GAUGE [facility] [place]        METFORMIN (GLUCOPHAGE) 500 MG TABLET    Take 500 mg by mouth 2 (two) times daily with meals.    METOLAZONE (ZAROXOLYN) 2.5 MG TABLET    Take 1 tablet by mouth once daily.    METOPROLOL TARTRATE (LOPRESSOR) 50 MG TABLET    Take 1 tablet by mouth 2 (two) times daily.    MONTELUKAST (SINGULAIR) 10 MG TABLET    Take 1 tablet (10 mg total) by mouth every evening.    MONTELUKAST (SINGULAIR) 10 MG TABLET    Take 10 mg by mouth.    NABUMETONE (RELAFEN) 500 MG TABLET    Take 1 tablet (500 mg total) by mouth once daily.    OMEGA-3 FATTY ACIDS 500 MG CAP    Take 1 capsule by mouth.    POTASSIUM CHLORIDE SA (K-DUR,KLOR-CON) 20 MEQ TABLET    Take 1 tablet by mouth once daily.    POTASSIUM CHLORIDE SA (K-DUR,KLOR-CON) 20 MEQ TABLET    Take 1 tablet by mouth.    RANITIDINE (ZANTAC) 150 MG TABLET    Take 150 mg by mouth.    TIOTROPIUM BROMIDE (SPIRIVA RESPIMAT) 2.5  MCG/ACTUATION MIST    Inhale 2 puffs into the lungs once daily. Controller    VITAMIN E 1000 UNIT CAPSULE    Take 1,000 Units by mouth once daily.     Review of patient's allergies indicates:   Allergen Reactions    Ace inhibitors Anaphylaxis    Ultram [tramadol] Anaphylaxis     Review of Systems   Constitutional: Negative for chills, fever and malaise/fatigue.   HENT: Negative for hearing loss.    Eyes: Negative for redness.   Cardiovascular: Negative for leg swelling.   Gastrointestinal: Negative for nausea and vomiting.   Musculoskeletal: Positive for joint pain and myalgias. Negative for back pain and falls.   Skin: Negative for rash.   Neurological: Negative for tingling, sensory change, focal weakness and weakness.        Objective:   Body mass index is 51.17 kg/m².  Vitals:    09/30/19 0904   BP: (!) 145/65   Pulse: 67   Weight: 118.8 kg (262 lb)   Height: 5' (1.524 m)   PainSc: 10-Worst pain ever   PainLoc: Shoulder           General    Nursing note and vitals reviewed.  Constitutional: She is oriented to person, place, and time. She appears well-developed and well-nourished. No distress.   HENT:   Head: Normocephalic and atraumatic.   Eyes: Conjunctivae are normal. No scleral icterus.   Pulmonary/Chest: Effort normal.   Neurological: She is alert and oriented to person, place, and time.   Psychiatric: She has a normal mood and affect. Her behavior is normal. Judgment and thought content normal.         Right Shoulder Exam     Inspection/Observation   Swelling: absent  Deformity: absent  Scapular Dyskinesia: negative    Tenderness   The patient is tender to palpation of the greater tuberosity.    Crepitus   The patient has crepitus of the supraspinatus.    Range of Motion   Active abduction: abnormal   Passive abduction: abnormal     Tests & Signs   Calhoun test: positive  Impingement: positive  Rotator Cuff Painful Arc/Range: severe  Active Compression Test (Ulen's Sign): positive    Other   Sensation:  normal    Left Shoulder Exam     Inspection/Observation   Swelling: absent  Deformity: absent  Scapular Dyskinesia: negative    Tenderness   The patient is tender to palpation of the greater tuberosity.    Crepitus   The patient has crepitus of the supraspinatus    Range of Motion   Active abduction: abnormal   Passive abduction: abnormal     Tests & Signs   Calhoun test: positive  Impingement: positive  Rotator Cuff Painful Arc/Range: severe  Active Compression Test (Harris's Sign): positive    Other   Sensation: normal       Muscle Strength   Right Upper Extremity   Shoulder Abduction: 4/5   Shoulder Internal Rotation: 4/5   Shoulder External Rotation: 4/5   Supraspinatus: 4/5/5   Subscapularis: 4/5/5   Biceps: 4/5/5   Left Upper Extremity  Shoulder Abduction: 4/5   Shoulder Internal Rotation: 4/5   Shoulder External Rotation: 4/5   Supraspinatus: 4/5/5   Subscapularis: 4/5/5   Biceps: 4/5/5       EXAMINATION:  MRI SHOULDER WITHOUT CONTRAST LEFT    CLINICAL HISTORY:  left shoulder pain;  Pain in left shoulder    TECHNIQUE:  Multiplanar multisequence images were performed through the left shoulder.  Contrast was not administered    COMPARISON:  Left shoulder radiographs from 07/26/2018    FINDINGS:  Exam detail limited by habitus.  There is no bone marrow edema.  No fracture or dislocation.  No lateral downsloping of the acromion is seen.    Postsurgical changes related to a prior acromioclavicular decompression are noted with cystic appearing changes noted in this region. There are degenerative changes at the glenohumeral join with osteophyte formation and subchondral cystic changes about the greater tuberosity.    No fluid in the subdeltoid bursa or glenohumeral joint.  No tear of the biceps tendon is seen.  The tendon demonstrates slightly increased signal intensity near the intra-articular portion suggestive of tendinopathy.    There is increased signal intensity at the insertion of the supraspinatus tendon  suggestive of tendinopathy.  No high-grade tear is seen.  Infraspinatus tendon is intact with very mild tendinopathy.  Subscapularis tendon also appears grossly intact    Evaluation for labral pathology is limited by lack of contrast within the joint.  However, within this limitation there does appear to be a complex tear involving the anterior superior labrum with posterior decentering of the humeral head noted.      Impression       Rotator cuff tendinopathy without evidence of high-grade tear.  Labral tear with posterior decentering of the humeral head on the glenoid.  Degenerative and postsurgical changes and other findings as discussed above      Electronically signed by: Ti Ellington MD  Date: 08/22/2018  Time: 17:21       EXAMINATION:  XR SHOULDER COMPLETE 2 OR MORE VIEWS BILATERAL    CLINICAL HISTORY:  Pain in right shoulder    TECHNIQUE:  Four views were obtained of the bilateral shoulders.    COMPARISON:  07/26/2018, 05/05/2017    FINDINGS:  The right humeral head is noted to be high riding suggesting possible rotator cuff tear.  Calcific density seen adjacent to the outer margins of the proximal femur may represent sequela of calcific tendinosis versus intra-articular body.  There is moderate to severe bilateral AC joint arthrosis.  Mild left and mild-to-moderate right osteoarthritis noted at the bilateral glenohumeral joints.  Prior sternotomy also noted.  Overall findings appear similar to prior.      Impression       As above      Electronically signed by: Etienne Chavarria MD  Date: 09/30/2019  Time: 09:21         Saira was seen today for pain.    Diagnoses and all orders for this visit:    Primary osteoarthritis of shoulders, bilateral    Impingement syndrome of right shoulder    Impingement syndrome of left shoulder    CRLD (chronic restrictive lung disease)    JACQUELINE on CPAP    Morbid obesity with BMI of 50.0-59.9, adult      -discuss clinical course and nature of bilateral osteoarthritis and rotator  cuff impingement syndrome with patient.  After discussing treatment options with patient, she would like to speak with Orthopedic surgeon about bilateral shoulder replacement.  Will prescribe topical compounding cream today.  Continue patient's gabapentin.  Discussed trying over-the-counter turmeric as well   -Bilateral Shoulder Xray images were independently viewed and read by me showing significant joint space loss and degenerative changes in bilateral shoulders.  Moderate osteophyte formation.  No acute fractures or dislocations.  -Formal read by radiologist is as described above  -Discussed findings with patient    -Chronic restrictive lung disease.  Managed by patient's pulmonologist.  -Chronic JACQUELINE on CPAP.  Managed by patient's pulmonologist.  -Chronic morbid obesity.  Managed by patient's PCP.  Counseled on importance of diet and weight loss.    -Treatment options and alternatives were discussed with the patient. Patient expressed understanding. Patient was given the opportunity to ask questions and be an active participant in their medical care. Patient had no further questions or concerns at this time.   -Patient is an overall high risk for health complications from their medical conditions.

## 2019-09-30 NOTE — LETTER
September 30, 2019      Christopher Jesus MD  [address] [address]           [facility]  [address] [place] [address]  Phone: [phone]  Fax: [phone]          Patient: Saira العراقي   MR Number: [id number]   YOB: 1945   Date of Visit: 9/30/2019       Dear Dr. Christopher Jesus:    Thank you for referring Saira العراقي to me for evaluation. Attached you will find relevant portions of my assessment and plan of care.    If you have questions, please do not hesitate to call me. I look forward to following Saira العراقي along with you.    Sincerely,    Etienne Latham MD    Enclosure  CC:  No Recipients    If you would like to receive this communication electronically, please contact [email or web address] or [phone] to request more information on [facility] access.    For providers and/or their staff who would like to refer a patient to [facility], please contact us through our one-stop-shop provider referral line, [facility], at [phone].    If you feel you have received this communication in error or would no longer like to receive these types of communications, please e-mail [email or web address]

## 2019-10-01 ENCOUNTER — TELEPHONE (OUTPATIENT)
Dept: ORTHOPEDICS | Facility: CLINIC | Age: 74
End: 2019-10-01

## 2019-10-01 NOTE — TELEPHONE ENCOUNTER
----- Message from Federica Patricio sent at 10/1/2019  8:43 AM CDT -----  Contact: Pt   Pt is calling regarding  Requesting  to have nurse call back. Pt states that call is concerning  the compound script pt states that she can not afford for $61. Pt  would like o speak to Doctor concerning the script. .[phone] (home)         .Thank You  Federica Patricio

## 2019-10-09 DIAGNOSIS — J45.40 MODERATE PERSISTENT ASTHMA WITHOUT COMPLICATION: Chronic | ICD-10-CM

## 2019-10-09 DIAGNOSIS — J45.40 MODERATE PERSISTENT ASTHMA WITHOUT COMPLICATION: ICD-10-CM

## 2019-10-09 RX ORDER — ALBUTEROL SULFATE 0.63 MG/3ML
SOLUTION RESPIRATORY (INHALATION)
Qty: 120 VIAL | Refills: 11 | Status: SHIPPED | OUTPATIENT
Start: 2019-10-09 | End: 2020-02-19 | Stop reason: SDUPTHER

## 2019-10-09 RX ORDER — FLUTICASONE FUROATE AND VILANTEROL 200; 25 UG/1; UG/1
1 POWDER RESPIRATORY (INHALATION) DAILY
Qty: 60 EACH | Refills: 11 | Status: SHIPPED | OUTPATIENT
Start: 2019-10-09 | End: 2020-02-19 | Stop reason: SDUPTHER

## 2019-10-09 NOTE — TELEPHONE ENCOUNTER
----- Message from Flor Martinez sent at 10/9/2019  8:42 AM CDT -----  Contact: Patient   Type:  RX Refill Request    Who Called: Patient   Refill or New Rx: Refill  RX Name and Strength: Albuterol solution  How is the patient currently taking it? (ex. 1XDay): 2x day   Is this a 30 day or 90 day RX: 30  Preferred Pharm[facility] with phone number:   [id number] [place] [facility] [address] [facility]  [address] [address]  Phone: [phone] Fax: [phone]  Local or Mail Order: Local  Ordering Provider: Dr. Mazariegos  Would the patient rather a call back or a response via [facility]? Call back   Best Call Back Number: Please call her at [phone]  Additional Information: n/a    Type:  RX Refill Request    Who Called: Patient  Refill or New Rx: Refill   RX Name and Strength: Breo  How is the patient currently taking it? (ex. 1XDay): n/a  Is this a 30 day or 90 day RX: 30  Preferred Pharmacy with phone number:   [id number] [place] [place] [facility] [address] [facility] [address] [id number] [address]  Phone: [phone] Fax: [phone]  Local or Mail Order: Local  Ordering Provider: Dr. Mazariegos  Would the patient rather a call back or a response via [facility]? Call back   Best Call Back Number: Please call her at [phone]  Additional Information: n/a

## 2019-10-15 ENCOUNTER — TELEPHONE (OUTPATIENT)
Dept: PODIATRY | Facility: CLINIC | Age: 74
End: 2019-10-15

## 2019-10-15 NOTE — TELEPHONE ENCOUNTER
Spoke with pt about diabetic shoes pt will come  order for shoes... please advise....    Nicole Carter MA  Podiatry Surgical Department  [facility]    ----- Message from Rocío Araiza sent at 10/15/2019 10:50 AM CDT -----  Type:  Needs Medical Advice    Who Called:  Pt  Saira  Symptoms (please be specific):   Diabetic shoes  How long has patient had these symptoms:     Pharmacy name and phone #:    Would the patient rather a call back or a response via [facility]?  Call back  Best Call Back Number:   [phone]  Additional Information:   Pt states  Dr had given her a prescription for diabetic shoes and she has misplaced it//would like to know if possible to get another one//she will be coming in on 10-21-19 and will pick it up then//please call//wayne/scot

## 2019-11-21 ENCOUNTER — OFFICE VISIT (OUTPATIENT)
Dept: ORTHOPEDICS | Facility: CLINIC | Age: 74
End: 2019-11-21
Payer: MEDICARE

## 2019-11-21 VITALS
HEIGHT: 60 IN | BODY MASS INDEX: 51.44 KG/M2 | SYSTOLIC BLOOD PRESSURE: 150 MMHG | DIASTOLIC BLOOD PRESSURE: 78 MMHG | WEIGHT: 262 LBS | HEART RATE: 67 BPM

## 2019-11-21 DIAGNOSIS — Z96.653 HISTORY OF TOTAL BILATERAL KNEE REPLACEMENT: ICD-10-CM

## 2019-11-21 DIAGNOSIS — M19.211 SECONDARY OSTEOARTHRITIS OF RIGHT SHOULDER DUE TO ROTATOR CUFF ARTHROPATHY: Primary | ICD-10-CM

## 2019-11-21 DIAGNOSIS — M77.8 LEFT SHOULDER TENDONITIS: ICD-10-CM

## 2019-11-21 PROCEDURE — 1125F PR PAIN SEVERITY QUANTIFIED, PAIN PRESENT: ICD-10-PCS | Mod: S$GLB,,, | Performed by: ORTHOPAEDIC SURGERY

## 2019-11-21 PROCEDURE — 3077F PR MOST RECENT SYSTOLIC BLOOD PRESSURE >= 140 MM HG: ICD-10-PCS | Mod: CPTII,S$GLB,, | Performed by: ORTHOPAEDIC SURGERY

## 2019-11-21 PROCEDURE — 1101F PR PT FALLS ASSESS DOC 0-1 FALLS W/OUT INJ PAST YR: ICD-10-PCS | Mod: CPTII,S$GLB,, | Performed by: ORTHOPAEDIC SURGERY

## 2019-11-21 PROCEDURE — 99214 OFFICE O/P EST MOD 30 MIN: CPT | Mod: S$GLB,,, | Performed by: ORTHOPAEDIC SURGERY

## 2019-11-21 PROCEDURE — 99999 PR PBB SHADOW E&M-EST. PATIENT-LVL III: ICD-10-PCS | Mod: PBBFAC,,, | Performed by: ORTHOPAEDIC SURGERY

## 2019-11-21 PROCEDURE — 1159F MED LIST DOCD IN RCRD: CPT | Mod: S$GLB,,, | Performed by: ORTHOPAEDIC SURGERY

## 2019-11-21 PROCEDURE — 1101F PT FALLS ASSESS-DOCD LE1/YR: CPT | Mod: CPTII,S$GLB,, | Performed by: ORTHOPAEDIC SURGERY

## 2019-11-21 PROCEDURE — 99999 PR PBB SHADOW E&M-EST. PATIENT-LVL III: CPT | Mod: PBBFAC,,, | Performed by: ORTHOPAEDIC SURGERY

## 2019-11-21 PROCEDURE — 1159F PR MEDICATION LIST DOCUMENTED IN MEDICAL RECORD: ICD-10-PCS | Mod: S$GLB,,, | Performed by: ORTHOPAEDIC SURGERY

## 2019-11-21 PROCEDURE — 3078F DIAST BP <80 MM HG: CPT | Mod: CPTII,S$GLB,, | Performed by: ORTHOPAEDIC SURGERY

## 2019-11-21 PROCEDURE — 3078F PR MOST RECENT DIASTOLIC BLOOD PRESSURE < 80 MM HG: ICD-10-PCS | Mod: CPTII,S$GLB,, | Performed by: ORTHOPAEDIC SURGERY

## 2019-11-21 PROCEDURE — 99214 PR OFFICE/OUTPT VISIT, EST, LEVL IV, 30-39 MIN: ICD-10-PCS | Mod: S$GLB,,, | Performed by: ORTHOPAEDIC SURGERY

## 2019-11-21 PROCEDURE — 3077F SYST BP >= 140 MM HG: CPT | Mod: CPTII,S$GLB,, | Performed by: ORTHOPAEDIC SURGERY

## 2019-11-21 PROCEDURE — 1125F AMNT PAIN NOTED PAIN PRSNT: CPT | Mod: S$GLB,,, | Performed by: ORTHOPAEDIC SURGERY

## 2019-11-21 NOTE — PATIENT INSTRUCTIONS
MRI right shoulder  May need reverse total shoulder replacement  Next visit needs xray both knee/last xray 2013  Return after MRI

## 2019-11-21 NOTE — PROGRESS NOTES
Subjective:     Patient ID: Saira العراقي is a 74 y.o. female.    Chief Complaint: Pain of the Left Shoulder and Pain of the Right Shoulder    HPI:  74-year-old  who had multiple surgical procedures to the right shoulder as well as left shoulder.  She was supposed to have right shoulder replacement by Dr. Teixeira.  The right shoulder had distal clavicle excisions and rotator cuffs repaired but she still unable to do much with it. The left shoulder is not as painful as the right side and can function better.  Having difficulty with combing her hair brushing her teeth in getting dressed due to the limitation of motion on the right shoulder.  She does had successful bilateral total knee arthroplasties years ago and not bothering her at all.  She does have numbness and tingling in her hands with diagnosis of carpal tunnel but did not go for surgery as of yet.  Her pain 7/10 right worse than left  Past Medical History:   Diagnosis Date    Arthritis     Asthma     COPD (chronic obstructive pulmonary disease)     Diabetes mellitus     Encounter for blood transfusion     Hypertension     Lung disease     Sleep apnea      Past Surgical History:   Procedure Laterality Date    APPENDECTOMY      CARDIAC SURGERY      valve replacement 12/2014    CATARACT EXTRACTION W/ INTRAOCULAR LENS  IMPLANT, BILATERAL      CHOLECYSTECTOMY      CYST REMOVAL      HERNIA REPAIR      left, 3/2015    HYSTERECTOMY      KNEE ARTHROPLASTY      shoulder       Family History   Problem Relation Age of Onset    Cancer Mother     Diabetes Mother     Hypertension Mother     Cancer Father     Hypertension Father     Heart failure Maternal Grandfather     Heart failure Son      Social History     Socioeconomic History    Marital status:      Spouse name: Not on file    Number of children: Not on file    Years of education: Not on file    Highest education level: Not on file   Occupational History    Not on  file   Social Needs    Financial resource strain: Not on file    Food insecurity:     Worry: Not on file     Inability: Not on file    Transportation needs:     Medical: Not on file     Non-medical: Not on file   Tobacco Use    Smoking status: Former Smoker     Packs/day: 1.00     Years: 50.00     Pack years: 50.00     Last attempt to quit: 2013     Years since quittin.9    Smokeless tobacco: Never Used   Substance and Sexual Activity    Alcohol use: No    Drug use: No    Sexual activity: Not on file   Lifestyle    Physical activity:     Days per week: Not on file     Minutes per session: Not on file    Stress: Not on file   Relationships    Social connections:     Talks on phone: Not on file     Gets together: Not on file     Attends Bahai service: Not on file     Active member of club or organization: Not on file     Attends meetings of clubs or organizations: Not on file     Relationship status: Not on file   Other Topics Concern    Not on file   Social History Narrative    Not on file     Medication List with Changes/Refills   Current Medications    ALBUTEROL (ACCUNEB) 0.63 MG/3 ML NEBU    INHALE 1 VIAL VIA NEBULIZER EVERY 6 HOURS AS NEEDED    ALBUTEROL (PROVENTIL/VENTOLIN HFA) 90 MCG/ACTUATION INHALER    Inhale 2 puffs into the lungs every 4 (four) hours as needed for Wheezing.    ALCOHOL PREP PADS PADM        ALENDRONATE (FOSAMAX) 70 MG TABLET    Take 70 mg by mouth once a week.    AMLODIPINE (NORVASC) 10 MG TABLET    Take 10 mg by mouth once daily.     AMMONIUM LACTATE 12 % CREA    Apply 1 Applicatorful topically.    ASPIRIN (ECOTRIN) 81 MG EC TABLET    Take 81 mg by mouth once daily.    ATORVASTATIN (LIPITOR) 40 MG TABLET    Take 40 mg by mouth once daily.     BENICAR 40 MG TABLET    Take 40 mg by mouth once daily.     CALCIUM CITRATE-VITAMIN D3 315-200 MG (CITRACAL+D) 315-200 MG-UNIT PER TABLET    Take 1 tablet by mouth once daily.     CLEVER CHOICE BLOOD GLUC SYS MIS         DICLOFENAC SODIUM 1 % GEL    Apply 2 g topically 4 (four) times daily.    [employer] BLOOD GLUCOSE SYSTEM STRP        FLUAD 7520-0903, 65 YR UP,,PF, 45 MCG (15 MCG X 3)/0.5 ML SYRG        FLUTICASONE FUROATE-VILANTEROL (BREO ELLIPTA) 200-25 MCG/DOSE DSDV DISKUS INHALER    Inhale 1 puff into the lungs once daily.    FLUTICASONE PROPIONATE (FLONASE) 50 MCG/ACTUATION NASAL SPRAY        FUROSEMIDE (LASIX) 40 MG TABLET    TAKE 1 TABLET BY MOUTH TWICE A DAY    GABAPENTIN (NEURONTIN) 300 MG CAPSULE    Take 300 mg by mouth 2 (two) times daily.     GLUCOSAMINE-D3-BOSWELLIA SERR (OSTEO BI-FLEX, 5-LOXIN,) 1,500-400-100 MG-UNIT-MG TAB    Take 1 tablet by mouth daily as needed.    HYDROCODONE-ACETAMINOPHEN (NORCO)  MG PER TABLET    Take 1 tablet by mouth daily as needed.    HYDROCODONE-ACETAMINOPHEN 10-325MG (NORCO)  MG TAB    Take 1 tablet by mouth every 6 (six) hours as needed.    LANCING DEVICE WITH LANCETS MISC        LIDOCAINE-PRILOCAINE (EMLA) CREAM    as needed.     LITE TOUCH LANCETS 30 GAUGE MISC        METFORMIN (GLUCOPHAGE) 500 MG TABLET    Take 500 mg by mouth 2 (two) times daily with meals.    METOLAZONE (ZAROXOLYN) 2.5 MG TABLET    Take 1 tablet by mouth once daily.    METOPROLOL TARTRATE (LOPRESSOR) 50 MG TABLET    Take 1 tablet by mouth 2 (two) times daily.    MONTELUKAST (SINGULAIR) 10 MG TABLET    Take 1 tablet (10 mg total) by mouth every evening.    MONTELUKAST (SINGULAIR) 10 MG TABLET    Take 10 mg by mouth.    NABUMETONE (RELAFEN) 500 MG TABLET    Take 1 tablet (500 mg total) by mouth once daily.    OMEGA-3 FATTY ACIDS 500 MG CAP    Take 1 capsule by mouth.    POTASSIUM CHLORIDE SA (K-DUR,KLOR-CON) 20 MEQ TABLET    Take 1 tablet by mouth once daily.    POTASSIUM CHLORIDE SA (K-DUR,KLOR-CON) 20 MEQ TABLET    Take 1 tablet by mouth.    RANITIDINE (ZANTAC) 150 MG TABLET    Take 150 mg by mouth.    TIOTROPIUM BROMIDE (SPIRIVA RESPIMAT) 2.5 MCG/ACTUATION MIST    Inhale 2 puffs into the lungs once daily.  Controller    VITAMIN E 1000 UNIT CAPSULE    Take 1,000 Units by mouth once daily.     Review of patient's allergies indicates:   Allergen Reactions    Ace inhibitors Anaphylaxis    Ultram [tramadol] Anaphylaxis     Review of Systems   Constitution: Negative for decreased appetite.   HENT: Negative for tinnitus.    Eyes: Negative for double vision.   Cardiovascular: Negative for chest pain.   Respiratory: Negative for wheezing.    Hematologic/Lymphatic: Negative for bleeding problem.   Skin: Negative for dry skin.   Musculoskeletal: Positive for arthritis, joint pain, myalgias and stiffness. Negative for back pain, gout and neck pain.   Gastrointestinal: Negative for abdominal pain.   Genitourinary: Negative for bladder incontinence.   Neurological: Positive for numbness and paresthesias. Negative for sensory change.   Psychiatric/Behavioral: Negative for altered mental status.       Objective:   Body mass index is 51.17 kg/m².  Vitals:    11/21/19 0923   BP: (!) 150/78   Pulse: 67          General    Constitutional: She is oriented to person, place, and time. She appears well-developed.   HENT:   Head: Normocephalic and atraumatic.   Eyes: EOM are normal.   Cardiovascular: Normal rate.    Pulmonary/Chest: Effort normal.   Abdominal: Soft.   Neurological: She is alert and oriented to person, place, and time.   Psychiatric: Judgment normal.            Cervical rotation is slightly limited.  Some paraspinal tenderness over the trapezius.  Lumbar with mild tenderness around L4-5 paraspinal.  No true deformity seen.  Right shoulder surgical scar is healed well. Flexion actively 45° abduction 20° external rotation 20°.  Passive flexion improved to 60 and abduction to 40.  Positive drop sign to resistance of abduction.  Severe pain and crepitus with motion. Biceps/triceps/wrist extension and flexion was 5/5.  Positive Tinel positive Phalen's test at the wrist.  Decrease in sensation to the tip of index and long finger  greater than 3 mm 2 point discrimination  Left shoulder was surgical scar is healed well. Active flexion 80° abduction 60° external rotation 30°.  Resistive abduction 4-/5  Biceps/triceps 5/5, mild positive Tinel sign and positive Phalen's test.  Greater than 2 mm 2 point discrimination of the index and long finger.  Radial and ulna pulses 1+  Pelvis is level  Bilateral hips full motion no pain.  Hip flexors abduct his adductors quads hamstrings ankle extensors and flexors inverters and everters all 5/5.  Bilateral knees midline surgical incision healed well no pain. No swelling.  Range of motion 0-130 degrees stable in extension and flexion  Calves are soft nontender negative Homans sign  DP 1+  Skin is warm to touch no obvious lesions  Patellar reflexes 1+    Relevant imaging results reviewed and interpreted by me, discussed with the patient and / or family today   X-ray and electronic records 09/30/2019 showing superior migration right shoulder with evidence lesser clavicle excision and acromioplasty.  There is some osteophytic changes. Left shoulder similar but no superior migration    Assessment:     Encounter Diagnoses   Name Primary?    Secondary osteoarthritis of right shoulder due to rotator cuff arthropathy Yes    Left shoulder tendonitis     History of total bilateral knee replacement         Plan:   Secondary osteoarthritis of right shoulder due to rotator cuff arthropathy  -     MRI Arthrogram Shoulder With Contrast Right; Future; Expected date: 11/21/2019  -     X-Ray Arthrogram Shoulder Right; Future; Expected date: 11/21/2019    Left shoulder tendonitis    History of total bilateral knee replacement         Patient Instructions   MRI right shoulder  May need reverse total shoulder replacement  Next visit needs xray both knee/last xray 2013  Return after MRI          Disclaimer: This note was prepared using a voice recognition system and is likely to have sound alike errors within the text.

## 2019-11-21 NOTE — H&P (VIEW-ONLY)
Subjective:     Patient ID: Saira العراقي is a 74 y.o. female.    Chief Complaint: Pain of the Left Shoulder and Pain of the Right Shoulder    HPI:  74-year-old  who had multiple surgical procedures to the right shoulder as well as left shoulder.  She was supposed to have right shoulder replacement by Dr. Teixeira.  The right shoulder had distal clavicle excisions and rotator cuffs repaired but she still unable to do much with it. The left shoulder is not as painful as the right side and can function better.  Having difficulty with combing her hair brushing her teeth in getting dressed due to the limitation of motion on the right shoulder.  She does had successful bilateral total knee arthroplasties years ago and not bothering her at all.  She does have numbness and tingling in her hands with diagnosis of carpal tunnel but did not go for surgery as of yet.  Her pain 7/10 right worse than left  Past Medical History:   Diagnosis Date    Arthritis     Asthma     COPD (chronic obstructive pulmonary disease)     Diabetes mellitus     Encounter for blood transfusion     Hypertension     Lung disease     Sleep apnea      Past Surgical History:   Procedure Laterality Date    APPENDECTOMY      CARDIAC SURGERY      valve replacement 12/2014    CATARACT EXTRACTION W/ INTRAOCULAR LENS  IMPLANT, BILATERAL      CHOLECYSTECTOMY      CYST REMOVAL      HERNIA REPAIR      left, 3/2015    HYSTERECTOMY      KNEE ARTHROPLASTY      shoulder       Family History   Problem Relation Age of Onset    Cancer Mother     Diabetes Mother     Hypertension Mother     Cancer Father     Hypertension Father     Heart failure Maternal Grandfather     Heart failure Son      Social History     Socioeconomic History    Marital status:      Spouse name: Not on file    Number of children: Not on file    Years of education: Not on file    Highest education level: Not on file   Occupational History    Not on  file   Social Needs    Financial resource strain: Not on file    Food insecurity:     Worry: Not on file     Inability: Not on file    Transportation needs:     Medical: Not on file     Non-medical: Not on file   Tobacco Use    Smoking status: Former Smoker     Packs/day: 1.00     Years: 50.00     Pack years: 50.00     Last attempt to quit: 2013     Years since quittin.9    Smokeless tobacco: Never Used   Substance and Sexual Activity    Alcohol use: No    Drug use: No    Sexual activity: Not on file   Lifestyle    Physical activity:     Days per week: Not on file     Minutes per session: Not on file    Stress: Not on file   Relationships    Social connections:     Talks on phone: Not on file     Gets together: Not on file     Attends Zoroastrianism service: Not on file     Active member of club or organization: Not on file     Attends meetings of clubs or organizations: Not on file     Relationship status: Not on file   Other Topics Concern    Not on file   Social History Narrative    Not on file     Medication List with Changes/Refills   Current Medications    ALBUTEROL (ACCUNEB) 0.63 MG/3 ML NEBU    INHALE 1 VIAL VIA NEBULIZER EVERY 6 HOURS AS NEEDED    ALBUTEROL (PROVENTIL/VENTOLIN HFA) 90 MCG/ACTUATION INHALER    Inhale 2 puffs into the lungs every 4 (four) hours as needed for Wheezing.    ALCOHOL PREP PADS PADM        ALENDRONATE (FOSAMAX) 70 MG TABLET    Take 70 mg by mouth once a week.    AMLODIPINE (NORVASC) 10 MG TABLET    Take 10 mg by mouth once daily.     AMMONIUM LACTATE 12 % CREA    Apply 1 Applicatorful topically.    ASPIRIN (ECOTRIN) 81 MG EC TABLET    Take 81 mg by mouth once daily.    ATORVASTATIN (LIPITOR) 40 MG TABLET    Take 40 mg by mouth once daily.     BENICAR 40 MG TABLET    Take 40 mg by mouth once daily.     CALCIUM CITRATE-VITAMIN D3 315-200 MG (CITRACAL+D) 315-200 MG-UNIT PER TABLET    Take 1 tablet by mouth once daily.     CLEVER CHOICE BLOOD GLUC SYS MIS         DICLOFENAC SODIUM 1 % GEL    Apply 2 g topically 4 (four) times daily.    [employer] BLOOD GLUCOSE SYSTEM STRP        FLUAD 5156-5455, 65 YR UP,,PF, 45 MCG (15 MCG X 3)/0.5 ML SYRG        FLUTICASONE FUROATE-VILANTEROL (BREO ELLIPTA) 200-25 MCG/DOSE DSDV DISKUS INHALER    Inhale 1 puff into the lungs once daily.    FLUTICASONE PROPIONATE (FLONASE) 50 MCG/ACTUATION NASAL SPRAY        FUROSEMIDE (LASIX) 40 MG TABLET    TAKE 1 TABLET BY MOUTH TWICE A DAY    GABAPENTIN (NEURONTIN) 300 MG CAPSULE    Take 300 mg by mouth 2 (two) times daily.     GLUCOSAMINE-D3-BOSWELLIA SERR (OSTEO BI-FLEX, 5-LOXIN,) 1,500-400-100 MG-UNIT-MG TAB    Take 1 tablet by mouth daily as needed.    HYDROCODONE-ACETAMINOPHEN (NORCO)  MG PER TABLET    Take 1 tablet by mouth daily as needed.    HYDROCODONE-ACETAMINOPHEN 10-325MG (NORCO)  MG TAB    Take 1 tablet by mouth every 6 (six) hours as needed.    LANCING DEVICE WITH LANCETS MISC        LIDOCAINE-PRILOCAINE (EMLA) CREAM    as needed.     LITE TOUCH LANCETS 30 GAUGE MISC        METFORMIN (GLUCOPHAGE) 500 MG TABLET    Take 500 mg by mouth 2 (two) times daily with meals.    METOLAZONE (ZAROXOLYN) 2.5 MG TABLET    Take 1 tablet by mouth once daily.    METOPROLOL TARTRATE (LOPRESSOR) 50 MG TABLET    Take 1 tablet by mouth 2 (two) times daily.    MONTELUKAST (SINGULAIR) 10 MG TABLET    Take 1 tablet (10 mg total) by mouth every evening.    MONTELUKAST (SINGULAIR) 10 MG TABLET    Take 10 mg by mouth.    NABUMETONE (RELAFEN) 500 MG TABLET    Take 1 tablet (500 mg total) by mouth once daily.    OMEGA-3 FATTY ACIDS 500 MG CAP    Take 1 capsule by mouth.    POTASSIUM CHLORIDE SA (K-DUR,KLOR-CON) 20 MEQ TABLET    Take 1 tablet by mouth once daily.    POTASSIUM CHLORIDE SA (K-DUR,KLOR-CON) 20 MEQ TABLET    Take 1 tablet by mouth.    RANITIDINE (ZANTAC) 150 MG TABLET    Take 150 mg by mouth.    TIOTROPIUM BROMIDE (SPIRIVA RESPIMAT) 2.5 MCG/ACTUATION MIST    Inhale 2 puffs into the lungs once daily.  Controller    VITAMIN E 1000 UNIT CAPSULE    Take 1,000 Units by mouth once daily.     Review of patient's allergies indicates:   Allergen Reactions    Ace inhibitors Anaphylaxis    Ultram [tramadol] Anaphylaxis     Review of Systems   Constitution: Negative for decreased appetite.   HENT: Negative for tinnitus.    Eyes: Negative for double vision.   Cardiovascular: Negative for chest pain.   Respiratory: Negative for wheezing.    Hematologic/Lymphatic: Negative for bleeding problem.   Skin: Negative for dry skin.   Musculoskeletal: Positive for arthritis, joint pain, myalgias and stiffness. Negative for back pain, gout and neck pain.   Gastrointestinal: Negative for abdominal pain.   Genitourinary: Negative for bladder incontinence.   Neurological: Positive for numbness and paresthesias. Negative for sensory change.   Psychiatric/Behavioral: Negative for altered mental status.       Objective:   Body mass index is 51.17 kg/m².  Vitals:    11/21/19 0923   BP: (!) 150/78   Pulse: 67          General    Constitutional: She is oriented to person, place, and time. She appears well-developed.   HENT:   Head: Normocephalic and atraumatic.   Eyes: EOM are normal.   Cardiovascular: Normal rate.    Pulmonary/Chest: Effort normal.   Abdominal: Soft.   Neurological: She is alert and oriented to person, place, and time.   Psychiatric: Judgment normal.            Cervical rotation is slightly limited.  Some paraspinal tenderness over the trapezius.  Lumbar with mild tenderness around L4-5 paraspinal.  No true deformity seen.  Right shoulder surgical scar is healed well. Flexion actively 45° abduction 20° external rotation 20°.  Passive flexion improved to 60 and abduction to 40.  Positive drop sign to resistance of abduction.  Severe pain and crepitus with motion. Biceps/triceps/wrist extension and flexion was 5/5.  Positive Tinel positive Phalen's test at the wrist.  Decrease in sensation to the tip of index and long finger  greater than 3 mm 2 point discrimination  Left shoulder was surgical scar is healed well. Active flexion 80° abduction 60° external rotation 30°.  Resistive abduction 4-/5  Biceps/triceps 5/5, mild positive Tinel sign and positive Phalen's test.  Greater than 2 mm 2 point discrimination of the index and long finger.  Radial and ulna pulses 1+  Pelvis is level  Bilateral hips full motion no pain.  Hip flexors abduct his adductors quads hamstrings ankle extensors and flexors inverters and everters all 5/5.  Bilateral knees midline surgical incision healed well no pain. No swelling.  Range of motion 0-130 degrees stable in extension and flexion  Calves are soft nontender negative Homans sign  DP 1+  Skin is warm to touch no obvious lesions  Patellar reflexes 1+    Relevant imaging results reviewed and interpreted by me, discussed with the patient and / or family today   X-ray and electronic records 09/30/2019 showing superior migration right shoulder with evidence lesser clavicle excision and acromioplasty.  There is some osteophytic changes. Left shoulder similar but no superior migration    Assessment:     Encounter Diagnoses   Name Primary?    Secondary osteoarthritis of right shoulder due to rotator cuff arthropathy Yes    Left shoulder tendonitis     History of total bilateral knee replacement         Plan:   Secondary osteoarthritis of right shoulder due to rotator cuff arthropathy  -     MRI Arthrogram Shoulder With Contrast Right; Future; Expected date: 11/21/2019  -     X-Ray Arthrogram Shoulder Right; Future; Expected date: 11/21/2019    Left shoulder tendonitis    History of total bilateral knee replacement         Patient Instructions   MRI right shoulder  May need reverse total shoulder replacement  Next visit needs xray both knee/last xray 2013  Return after MRI          Disclaimer: This note was prepared using a voice recognition system and is likely to have sound alike errors within the text.

## 2019-11-27 DIAGNOSIS — J45.40 MODERATE PERSISTENT ASTHMA WITHOUT COMPLICATION: Chronic | ICD-10-CM

## 2019-11-28 RX ORDER — MONTELUKAST SODIUM 10 MG/1
TABLET ORAL
Qty: 90 TABLET | Refills: 3 | Status: SHIPPED | OUTPATIENT
Start: 2019-11-28 | End: 2020-02-19 | Stop reason: SDUPTHER

## 2019-12-13 ENCOUNTER — HOSPITAL ENCOUNTER (OUTPATIENT)
Dept: RADIOLOGY | Facility: HOSPITAL | Age: 74
Discharge: HOME | End: 2019-12-13
Attending: ORTHOPAEDIC SURGERY
Payer: MEDICARE

## 2019-12-13 DIAGNOSIS — M19.211 SECONDARY OSTEOARTHRITIS OF RIGHT SHOULDER DUE TO ROTATOR CUFF ARTHROPATHY: ICD-10-CM

## 2019-12-13 PROCEDURE — 25500020 PHARM REV CODE 255: Performed by: ORTHOPAEDIC SURGERY

## 2019-12-13 PROCEDURE — 73222 MRI JOINT UPR EXTREM W/DYE: CPT | Mod: TC,RT

## 2019-12-13 PROCEDURE — 73040 CONTRAST X-RAY OF SHOULDER: CPT | Mod: TC,RT

## 2019-12-13 PROCEDURE — A9585 GADOBUTROL INJECTION: HCPCS | Performed by: ORTHOPAEDIC SURGERY

## 2019-12-13 RX ORDER — GADOBUTROL 604.72 MG/ML
0.2 INJECTION INTRAVENOUS
Status: COMPLETED | OUTPATIENT
Start: 2019-12-13 | End: 2019-12-13

## 2019-12-13 RX ADMIN — GADOBUTROL 0.2 ML: 604.72 INJECTION INTRAVENOUS at 12:12

## 2019-12-13 RX ADMIN — IOHEXOL 10 ML: 300 INJECTION, SOLUTION INTRAVENOUS at 12:12

## 2019-12-13 NOTE — DISCHARGE SUMMARY
Procedure was performed Vera SCHNEIDER.  Sterile technique was performed in the right shoulder, lidocaine was used as a local anesthetic.  10 cc Solution of omnipaque, saline, and gadavist was injected into the shoulder joint .  Pt tolerated the procedure well without immediate complications.  Please see radiologist report for details. F/u with PCP and/or ordering physician.

## 2020-02-07 ENCOUNTER — TELEPHONE (OUTPATIENT)
Dept: PULMONOLOGY | Facility: CLINIC | Age: 75
End: 2020-02-07

## 2020-02-07 NOTE — TELEPHONE ENCOUNTER
----- Message from Valencia Herring sent at 2/7/2020  1:57 PM CST -----  Contact: self [phone]  States that her cardiologist Dr Stinson recently did cardiac testing on her. States that she had a stress test, echo, and ekg. States that she is wanting to know if she needs to redo the Echo for Dr Mazariegos. Please call back at [phone]//thank you acc

## 2020-02-13 ENCOUNTER — HOSPITAL ENCOUNTER (OUTPATIENT)
Dept: CARDIOLOGY | Facility: HOSPITAL | Age: 75
Discharge: HOME | End: 2020-02-13
Attending: INTERNAL MEDICINE
Payer: MEDICARE

## 2020-02-13 VITALS
SYSTOLIC BLOOD PRESSURE: 150 MMHG | WEIGHT: 262 LBS | BODY MASS INDEX: 51.44 KG/M2 | DIASTOLIC BLOOD PRESSURE: 78 MMHG | HEIGHT: 60 IN | HEART RATE: 70 BPM

## 2020-02-13 DIAGNOSIS — I34.0 CARDIAC MURMUR DUE TO MITRAL VALVE DISORDER: ICD-10-CM

## 2020-02-13 DIAGNOSIS — R01.1 CARDIAC MURMUR, UNSPECIFIED: Chronic | ICD-10-CM

## 2020-02-13 DIAGNOSIS — R01.1 CARDIAC MURMUR, UNSPECIFIED: ICD-10-CM

## 2020-02-13 LAB
ASCENDING AORTA: 2.56 CM
AV INDEX (PROSTH): 0.45
AV MEAN GRADIENT: 29 MMHG
AV PEAK GRADIENT: 58 MMHG
AV VALVE AREA: 1.41 CM2
AV VELOCITY RATIO: 0.38
BSA FOR ECHO PROCEDURE: 2.24 M2
CV ECHO LV RWT: 0.64 CM
DOP CALC AO PEAK VEL: 3.81 M/S
DOP CALC AO VTI: 71.29 CM
DOP CALC LVOT AREA: 3.1 CM2
DOP CALC LVOT DIAMETER: 2 CM
DOP CALC LVOT PEAK VEL: 1.46 M/S
DOP CALC LVOT STROKE VOLUME: 100.32 CM3
DOP CALCLVOT PEAK VEL VTI: 31.95 CM
E WAVE DECELERATION TIME: 184.96 MSEC
E/A RATIO: 1.18
E/E' RATIO: 10.78 M/S
ECHO LV POSTERIOR WALL: 1.41 CM (ref 0.6–1.1)
FRACTIONAL SHORTENING: 38 % (ref 28–44)
INTERVENTRICULAR SEPTUM: 1.28 CM (ref 0.6–1.1)
IVRT: 0.08 MSEC
LA MAJOR: 5.4 CM
LA MINOR: 4.94 CM
LA WIDTH: 3.39 CM
LEFT ATRIUM SIZE: 4.3 CM
LEFT ATRIUM VOLUME INDEX: 30.5 ML/M2
LEFT ATRIUM VOLUME: 63.93 CM3
LEFT INTERNAL DIMENSION IN SYSTOLE: 2.76 CM (ref 2.1–4)
LEFT VENTRICLE DIASTOLIC VOLUME INDEX: 42.33 ML/M2
LEFT VENTRICLE DIASTOLIC VOLUME: 88.62 ML
LEFT VENTRICLE MASS INDEX: 109 G/M2
LEFT VENTRICLE SYSTOLIC VOLUME INDEX: 13.6 ML/M2
LEFT VENTRICLE SYSTOLIC VOLUME: 28.47 ML
LEFT VENTRICULAR INTERNAL DIMENSION IN DIASTOLE: 4.42 CM (ref 3.5–6)
LEFT VENTRICULAR MASS: 227.8 G
LV LATERAL E/E' RATIO: 9.7 M/S
LV SEPTAL E/E' RATIO: 12.13 M/S
MV PEAK A VEL: 0.82 M/S
MV PEAK E VEL: 0.97 M/S
PISA TR MAX VEL: 4.49 M/S
PULM VEIN S/D RATIO: 1.23
PV PEAK D VEL: 0.6 M/S
PV PEAK S VEL: 0.74 M/S
PV PEAK VELOCITY: 1.57 CM/S
RA MAJOR: 4.92 CM
RA PRESSURE: 3 MMHG
RA WIDTH: 3.96 CM
RIGHT VENTRICULAR END-DIASTOLIC DIMENSION: 3.83 CM
SINUS: 2.25 CM
STJ: 2.08 CM
TDI LATERAL: 0.1 M/S
TDI SEPTAL: 0.08 M/S
TDI: 0.09 M/S
TR MAX PG: 81 MMHG
TRICUSPID ANNULAR PLANE SYSTOLIC EXCURSION: 1.8 CM
TV REST PULMONARY ARTERY PRESSURE: 84 MMHG

## 2020-02-13 PROCEDURE — 93306 TTE W/DOPPLER COMPLETE: CPT

## 2020-02-13 PROCEDURE — 93306 ECHO (CUPID ONLY): ICD-10-PCS | Mod: 26,,, | Performed by: INTERNAL MEDICINE

## 2020-02-13 PROCEDURE — 93306 TTE W/DOPPLER COMPLETE: CPT | Mod: 26,,, | Performed by: INTERNAL MEDICINE

## 2020-02-13 RX ORDER — SODIUM CHLORIDE 9 MG/ML
5 INJECTION, SOLUTION INTRAMUSCULAR; INTRAVENOUS; SUBCUTANEOUS
Status: SHIPPED | OUTPATIENT
Start: 2020-02-13

## 2020-02-13 RX ADMIN — SODIUM CHLORIDE 5 ML: 9 INJECTION, SOLUTION INTRAMUSCULAR; INTRAVENOUS; SUBCUTANEOUS at 10:02

## 2020-02-19 ENCOUNTER — OFFICE VISIT (OUTPATIENT)
Dept: PULMONOLOGY | Facility: CLINIC | Age: 75
End: 2020-02-19
Payer: MEDICARE

## 2020-02-19 ENCOUNTER — CLINICAL SUPPORT (OUTPATIENT)
Dept: PULMONOLOGY | Facility: CLINIC | Age: 75
End: 2020-02-19
Payer: MEDICARE

## 2020-02-19 ENCOUNTER — HOSPITAL ENCOUNTER (OUTPATIENT)
Dept: RADIOLOGY | Facility: HOSPITAL | Age: 75
Discharge: HOME | End: 2020-02-19
Attending: INTERNAL MEDICINE
Payer: MEDICARE

## 2020-02-19 VITALS
WEIGHT: 259 LBS | DIASTOLIC BLOOD PRESSURE: 59 MMHG | OXYGEN SATURATION: 95 % | BODY MASS INDEX: 50.85 KG/M2 | HEART RATE: 71 BPM | HEIGHT: 60 IN | RESPIRATION RATE: 20 BRPM | SYSTOLIC BLOOD PRESSURE: 129 MMHG

## 2020-02-19 VITALS — BODY MASS INDEX: 50.95 KG/M2 | WEIGHT: 259.5 LBS | HEIGHT: 60 IN

## 2020-02-19 DIAGNOSIS — J45.40 MODERATE PERSISTENT ASTHMA WITHOUT COMPLICATION: Chronic | ICD-10-CM

## 2020-02-19 DIAGNOSIS — J98.4 CRLD (CHRONIC RESTRICTIVE LUNG DISEASE): ICD-10-CM

## 2020-02-19 DIAGNOSIS — I27.22 PULMONARY HYPERTENSION DUE TO LEFT HEART VALVULAR DISEASE: Chronic | ICD-10-CM

## 2020-02-19 DIAGNOSIS — I35.0 MODERATE AORTIC VALVE STENOSIS: Chronic | ICD-10-CM

## 2020-02-19 DIAGNOSIS — J98.4 CRLD (CHRONIC RESTRICTIVE LUNG DISEASE): Chronic | ICD-10-CM

## 2020-02-19 DIAGNOSIS — E66.01 MORBID OBESITY WITH BMI OF 50.0-59.9, ADULT: Chronic | ICD-10-CM

## 2020-02-19 DIAGNOSIS — G47.33 OSA ON CPAP: Primary | Chronic | ICD-10-CM

## 2020-02-19 DIAGNOSIS — I38 PULMONARY HYPERTENSION DUE TO LEFT HEART VALVULAR DISEASE: Chronic | ICD-10-CM

## 2020-02-19 PROBLEM — N17.9 AKI (ACUTE KIDNEY INJURY): Status: RESOLVED | Noted: 2017-08-10 | Resolved: 2020-02-19

## 2020-02-19 PROCEDURE — 99215 PR OFFICE/OUTPT VISIT, EST, LEVL V, 40-54 MIN: ICD-10-PCS | Mod: 25,S$GLB,, | Performed by: INTERNAL MEDICINE

## 2020-02-19 PROCEDURE — 71046 X-RAY EXAM CHEST 2 VIEWS: CPT | Mod: TC

## 2020-02-19 PROCEDURE — 71046 XR CHEST PA AND LATERAL: ICD-10-PCS | Mod: 26,,, | Performed by: RADIOLOGY

## 2020-02-19 PROCEDURE — 1159F MED LIST DOCD IN RCRD: CPT | Mod: S$GLB,,, | Performed by: INTERNAL MEDICINE

## 2020-02-19 PROCEDURE — 3074F PR MOST RECENT SYSTOLIC BLOOD PRESSURE < 130 MM HG: ICD-10-PCS | Mod: CPTII,S$GLB,, | Performed by: INTERNAL MEDICINE

## 2020-02-19 PROCEDURE — 94726 PLETHYSMOGRAPHY LUNG VOLUMES: CPT | Mod: S$GLB,,, | Performed by: INTERNAL MEDICINE

## 2020-02-19 PROCEDURE — 94618 PULMONARY STRESS TESTING: ICD-10-PCS | Mod: S$GLB,,, | Performed by: INTERNAL MEDICINE

## 2020-02-19 PROCEDURE — 1101F PT FALLS ASSESS-DOCD LE1/YR: CPT | Mod: CPTII,S$GLB,, | Performed by: INTERNAL MEDICINE

## 2020-02-19 PROCEDURE — 94010 BREATHING CAPACITY TEST: ICD-10-PCS | Mod: S$GLB,,, | Performed by: INTERNAL MEDICINE

## 2020-02-19 PROCEDURE — 1159F PR MEDICATION LIST DOCUMENTED IN MEDICAL RECORD: ICD-10-PCS | Mod: S$GLB,,, | Performed by: INTERNAL MEDICINE

## 2020-02-19 PROCEDURE — 99215 OFFICE O/P EST HI 40 MIN: CPT | Mod: 25,S$GLB,, | Performed by: INTERNAL MEDICINE

## 2020-02-19 PROCEDURE — 1101F PR PT FALLS ASSESS DOC 0-1 FALLS W/OUT INJ PAST YR: ICD-10-PCS | Mod: CPTII,S$GLB,, | Performed by: INTERNAL MEDICINE

## 2020-02-19 PROCEDURE — 94729 PR C02/MEMBANE DIFFUSE CAPACITY: ICD-10-PCS | Mod: S$GLB,,, | Performed by: INTERNAL MEDICINE

## 2020-02-19 PROCEDURE — 94729 DIFFUSING CAPACITY: CPT | Mod: S$GLB,,, | Performed by: INTERNAL MEDICINE

## 2020-02-19 PROCEDURE — 99999 PR PBB SHADOW E&M-EST. PATIENT-LVL III: ICD-10-PCS | Mod: PBBFAC,,, | Performed by: INTERNAL MEDICINE

## 2020-02-19 PROCEDURE — 3074F SYST BP LT 130 MM HG: CPT | Mod: CPTII,S$GLB,, | Performed by: INTERNAL MEDICINE

## 2020-02-19 PROCEDURE — 71046 X-RAY EXAM CHEST 2 VIEWS: CPT | Mod: 26,,, | Performed by: RADIOLOGY

## 2020-02-19 PROCEDURE — 94618 PULMONARY STRESS TESTING: CPT | Mod: S$GLB,,, | Performed by: INTERNAL MEDICINE

## 2020-02-19 PROCEDURE — 94726 PULM FUNCT TST PLETHYSMOGRAP: ICD-10-PCS | Mod: S$GLB,,, | Performed by: INTERNAL MEDICINE

## 2020-02-19 PROCEDURE — 3078F PR MOST RECENT DIASTOLIC BLOOD PRESSURE < 80 MM HG: ICD-10-PCS | Mod: CPTII,S$GLB,, | Performed by: INTERNAL MEDICINE

## 2020-02-19 PROCEDURE — 3078F DIAST BP <80 MM HG: CPT | Mod: CPTII,S$GLB,, | Performed by: INTERNAL MEDICINE

## 2020-02-19 PROCEDURE — 99999 PR PBB SHADOW E&M-EST. PATIENT-LVL III: CPT | Mod: PBBFAC,,, | Performed by: INTERNAL MEDICINE

## 2020-02-19 PROCEDURE — 94010 BREATHING CAPACITY TEST: CPT | Mod: S$GLB,,, | Performed by: INTERNAL MEDICINE

## 2020-02-19 RX ORDER — ALBUTEROL SULFATE 90 UG/1
2 AEROSOL, METERED RESPIRATORY (INHALATION) EVERY 6 HOURS PRN
COMMUNITY
End: 2020-02-19 | Stop reason: SDUPTHER

## 2020-02-19 RX ORDER — ALBUTEROL SULFATE 0.63 MG/3ML
SOLUTION RESPIRATORY (INHALATION)
Qty: 120 VIAL | Refills: 11 | Status: SHIPPED | OUTPATIENT
Start: 2020-02-19

## 2020-02-19 RX ORDER — FLUTICASONE FUROATE AND VILANTEROL 200; 25 UG/1; UG/1
1 POWDER RESPIRATORY (INHALATION) DAILY
Qty: 60 EACH | Refills: 11 | Status: SHIPPED | OUTPATIENT
Start: 2020-02-19 | End: 2021-06-02 | Stop reason: SDUPTHER

## 2020-02-19 RX ORDER — ALBUTEROL SULFATE 90 UG/1
2 AEROSOL, METERED RESPIRATORY (INHALATION) EVERY 6 HOURS PRN
Qty: 18 G | Refills: 11 | Status: SHIPPED | OUTPATIENT
Start: 2020-02-19 | End: 2020-03-04 | Stop reason: CLARIF

## 2020-02-19 RX ORDER — MONTELUKAST SODIUM 10 MG/1
10 TABLET ORAL NIGHTLY
Qty: 90 TABLET | Refills: 3 | Status: SHIPPED | OUTPATIENT
Start: 2020-02-19 | End: 2020-12-29 | Stop reason: SDUPTHER

## 2020-02-19 RX ORDER — BUDESONIDE AND FORMOTEROL FUMARATE DIHYDRATE 160; 4.5 UG/1; UG/1
AEROSOL RESPIRATORY (INHALATION)
COMMUNITY
Start: 2019-12-17 | End: 2020-02-19

## 2020-02-19 NOTE — ASSESSMENT & PLAN NOTE
Asthma ROS: Taking medications as instructed, no medication side effects noted  New concerns: None.   Exam: appears well, vitals normal, no respiratory distress, acyanotic, normal RR, chest clear, no wheezing, crepitations, rhonchi, normal symmetric air entry.   Assessment:  Asthma well controlled.   Plan:  BREO,SINGULAIR, VENTOLIN and PROVENTIL. Current treatment plan is effective. CXR in 6 months. MEDS refilled.

## 2020-02-19 NOTE — PATIENT INSTRUCTIONS
Lung Anatomy  Your lungs take air in to give your body oxygen, which the body needs to work. Your lungs, like all the tissues in your body, are made up of billions of tiny specialized cells. Old lung cells die and are replaced by new, identical lung cells. This natural process helps ensure healthy lungs.    Date Last Reviewed: 11/1/2016  © 4273-2358 [employer]. [address] [place] [address], [place] [address]. All rights reserved. This information is not intended as a substitute for professional medical care. Always follow your healthcare professional's instructions.

## 2020-02-19 NOTE — PROGRESS NOTES
Subjective:      Established patient    Patient ID: Saira العراقي is a 74 y.o. female.  Patient Active Problem List   Diagnosis    Right knee pain    Moderate persistent asthma without complication    JACQUELINE on CPAP    Arthritis of right shoulder region    Right shoulder tendonitis    Carpal tunnel syndrome    Right carpal tunnel syndrome    Unspecified disorder of synovium, tendon, and bursa    Acromioclavicular (joint) (ligament) sprain    Impingement syndrome of right shoulder    Rotator cuff tendinitis    Morbid obesity with BMI of 50.0-59.9, adult    Arthritis of left shoulder region    Impingement syndrome of left shoulder    Impaired physical mobility    Synovitis of left knee    Numbness and tingling    Left knee pain    Left shoulder pain    Carpal tunnel syndrome on both sides    Preop testing    CRLD (chronic restrictive lung disease)    Moderate aortic valve stenosis S/P AVR12/31/2014    Pulmonary hypertension due to left heart valvular disease       Problem list has been reviewed.    Chief Complaint: Asthma (REV WALK/YEIMI); RESTRICTIVE LUNG DISEASE; and Sleep Apnea (DL IN MEDIA)    PFT, 6MWT reviewed with patient who voiced understanding.      Patients reports NYHA II dyspnea    The patient does not have currently have symptoms / an exacerbation.      Her current respiratory therapy regimen is SINGULAIR, BREO, VENTOLIN and PROVENTIL which provides  relief. She is adherent with her regimen.       REGIMEN Morning Evening   NEBULIZER PROVENTIL NEBS PROVENTIL NEBS   Controller  BREO -   RESCUE - VENTOLIN PRN PRN       ASTHMA IS  CONTROLLED.     Asthma Control Test  In the past 4  weeks, how much of the time did your asthma keep you from getting as much done at work, school or at home?: None of the time  During the past 4 weeks, how often have you had shortness of breath?: More than once a day  During the past 4 weeks, how often did your asthma symptoms (wheezing, couging, shortness  of breath, chest tightness or pain) wake you up at night or earlier than usual in the morning?: Not at all  During the past 4 weeks, how often have you used your rescue inhaler or nebulizer medication (such as albuterol)?: 1 or 2 times per day  How would you rate your asthma control during the past 4 weeks?: Well controlled  If your score is 19 or less, your asthma may not be under control: 17     No recent change in breathing.     Current Disease Severity  frequent daytime asthma symptoms.   weekly nighttime asthma symptoms.   Frequency B-agonist use: daily.   Number of urgent/emergent visit in last year: 2 times.  Current limitations in activity from asthma: none.   Number of days of school or work missed in the last month: not applicable.      Asthma Classification (General Symptom Frequency):  Mild Intermittent (< 2 x wk)  Mild Persistent (> 2 x wk, < daily)  Moderate Persistent (daily; almost daily inhaler)  Severe Persistent (continual; limited activities)       Immunization status reviewed and up to date.     She is on CPAP of 13 cms. She did not bring her data card for compliance download.  Patient denies snoring, headaches, excessive daytime sleepiness. She definitely thinks PAP is beneficial to her health and she wants to continue with PAP therapy.     Compliance Summary  11/20/2019 - 2/17/2020 (90 days)  Days with Device Usage 70 days  Days without Device Usage 20 days  Percent Days with Device Usage 77.8%  Cumulative Usage 15 days 1 hrs. 28 mins. 31 secs.  Maximum Usage (1 Day) 8 hrs. 28 mins. 27 secs.  Average Usage (All Days) 4 hrs. 59 secs.  Average Usage (Days Used) 5 hrs. 9 mins. 50 secs.  Minimum Usage (1 Day) 57 mins. 34 secs.  Percent of Days with Usage >= 4 Hours 58.9%  Percent of Days with Usage < 4 Hours 41.1%  Date Range  Total Blower Time 15 days 13 hrs. 50 mins. 10 secs.  CPAP Summary  Average Time in Large Leak Per Day 1 hrs. 4 mins. 9 secs.  Average AHI 4.3  CPAP 13.0 cmH2O  Printed By:  [facility] - version: 2.43.2.0 Page 1 of 2  This report    [place] Sleepiness Scale   EPWORTH SLEEPINESS SCALE 2/19/2020 8/12/2019 1/18/2019 1/2/2019 2/2/2018 11/17/2015 9/17/2015   Sitting and reading 3 3 3 0 1 3 3   Watching TV 3 3 3 3 3 3 3   Sitting, inactive in a public place (e.g. a theatre or a meeting) 0 0 0 0 1 0 0   As a passenger in a car for an hour without a break 0 0 0 1 0 2 0   Lying down to rest in the afternoon when circumstances permit 3 3 3 3 3 3 2   Sitting and talking to someone 0 1 0 0 0 0 0   Sitting quietly after a lunch without alcohol 1 2 2 3 2 3 2   In a car, while stopped for a few minutes in traffic 0 0 0 0 0 0 0   Total score 10 12 11 10 10 14 10        A full  review of systems, past , family  and social histories was performed except as mentioned in the note above, these are non contributory to the main issues discussed today.       Previous Report Reviewed: lab reports, office notes and radiology reports     The following portions of the patient's history were reviewed and updated as appropriate: She  has a past medical history of Arthritis, Asthma, COPD (chronic obstructive pulmonary disease), Diabetes mellitus, Encounter for blood transfusion, Hypertension, Lung disease, and Sleep apnea.  She  has a past surgical history that includes Knee Arthroplasty; shoulder; Cyst Removal; Appendectomy; Hysterectomy; Cholecystectomy; Cardiac surgery; Hernia repair; and Cataract extraction w/ intraocular lens  implant, bilateral.  Her family history includes Cancer in her father and mother; Diabetes in her mother; Heart failure in her maternal grandfather and son; Hypertension in her father and mother.  She  reports that she quit smoking about 6 years ago. She has a 50.00 pack-year smoking history. She has never used smokeless tobacco. She reports that she does not drink alcohol or use drugs.  She has a current medication list which includes the following prescription(s): albuterol, alcohol  prep pads, amlodipine, ammonium lactate, aspirin, atorvastatin, benicar, calcium citrate-vitamin d3 315-200 mg, clever choice blood gluc sys, [employer] blood glucose system, fluad 5111-7651 (65 yr up)(pf), fluticasone propionate, furosemide, gabapentin, glucosamine-d3-boswellia serr, hydrocodone-acetaminophen, lancing device with lancets, lidocaine-prilocaine, lite touch lancets, metformin, metoprolol tartrate, potassium chloride sa, vitamin e, albuterol, alendronate, diclofenac sodium, fluticasone furoate-vilanterol, metolazone, montelukast, omega-3 fatty acids, and tiotropium bromide, and the following Facility-Administered Medications: sodium chloride 0.9%.  She is allergic to ace inhibitors and ultram [tramadol]..    Review of Systems   Constitutional: Negative for fever, chills and fatigue.   HENT: Positive for rhinorrhea, sinus pressure and congestion. Negative for postnasal drip.    Eyes: Positive for itching.   Respiratory: Positive for shortness of breath and dyspnea on extertion. Negative for cough, hemoptysis, sputum production and wheezing.    Cardiovascular: Positive for leg swelling. Negative for chest pain.   Genitourinary: Negative for difficulty urinating and hematuria.   Musculoskeletal: Positive for arthralgias and back pain.   Skin: Negative for rash.   Gastrointestinal: Negative for nausea, vomiting and abdominal pain.   Neurological: Negative for dizziness, syncope, light-headedness and headaches.   Hematological: Bleeds easily.   Psychiatric/Behavioral: The patient is not nervous/anxious.         Objective:     BP (!) 129/59   Pulse 71   Resp 20   Ht 5' (1.524 m)   Wt 117.5 kg (259 lb)   SpO2 95%   BMI 50.58 kg/m²   Body mass index is 50.58 kg/m².     Physical Exam   Constitutional: She is oriented to person, place, and time. She appears well-developed and well-nourished.   Obese   HENT:   Head: Normocephalic and atraumatic.   Neck: Normal range of motion. Neck supple.   Cardiovascular:  Normal rate and regular rhythm. Exam reveals no gallop.   Murmur heard.   Crescendo systolic murmur is present with a grade of 3/6.  Pulmonary/Chest: Effort normal and breath sounds normal.   Abdominal: Soft. Bowel sounds are normal.   Musculoskeletal: Normal range of motion. She exhibits edema.   Neurological: She is alert and oriented to person, place, and time.   Skin: Skin is warm and dry.   Psychiatric: She has a normal mood and affect.       Personal Diagnostic Review    OVERNIGHT OXIMETRY REPORT:    Dictated by: Cassius Langston M.D.  Test date: 19  Dictated on: 2019      Comment: This test was performed on CPAP 16 CMWP and room air     A desaturation event was defined as a decrease of saturation by   4 or more.    REPORT SUMMARY  Total recording time: 07:28:48  Excluded samplin:12:48  Total valid samplin:16:00  High pulse: 98 /min  Low pulse: 58 /min    Mean pulse: 71/min    High SpO2: 97%    Low SpO2: 78%    Mean SpO2  91.6 %  Cumulative time with oxygen saturation less than 88% (TC88):   00:21:04 ( 4.8%)      CLINICAL INTERPRETATION   There is  significant nocturnal oxygen desaturation and    Clinical correlation is advised    Medicare Criteria Comments:   Oximetry test results suggest the patient falls under Medicare   Group 1 Criteria. ( Arterial oxygen saturation at or below 88%   for at least 5 minutes taken during sleep)    Transthoracic echo (TTE) complete: 20:     · Concentric left ventricular hypertrophy.  · Normal left ventricular systolic function. The estimated ejection fraction is 55%.  · Normal LV diastolic function.  · Normal right ventricular systolic function.  · Mild to moderate tricuspid regurgitation.  · Normal central venous pressure (3 mmHg).  · The estimated PA systolic pressure is 84 mmHg.  · Pulmonary hypertension present.  · Moderate aortic valve stenosis.  · Aortic valve area is 1.41 cm2; peak velocity is 3.81 m/s; mean gradient is 29 mmHg.      Pulmonary  function tests: 02/19/20: FEV1:1.09  (70.1 % predicted), FVC:  1.32 (66.4 % predicted), FEV1/FVC:  82, TLC: 3.29 (77.5 % predicted), RV/TLVC: 57 (129.2 % predicted), DLCO: 10.83 (59.8 % predicted)  Normal airflow. Mild restriction. Diffusion capacity is moderately reduced but corrects for alveolar volume.    Six minute walk test: 02/19/20:   Six minute walk distance is 220.98 / 291.31 meters (75.86 % predicted) with very heavy dyspnea.   Peak VO2 during walking was 10.61  Ml/min/kg [ 3.03 METS].   During exercise, there was significant desaturation while breathing room air.  Lowest oxygen saturation during walking was 92 %.   Both blood pressure and heart rate increased significantly with walking.  Normotension was present prior to exercise.  This may represent an abnormal cardiovascular response to exercise.  The patient did not report non-pulmonary symptoms during exercise.  There was no significant exercise impairment during walking.  The patient did complete the study, walking 293 seconds of the 360 second test.  The patient did not require oxygen supplementation during walking.  Since the previous study in 01/18/19, exercise capacity is significantly improved ( 32%)  Based upon age and body mass index, exercise capacity is normal.    CXR: 02/19/20: Bilateral areas of scarring and/or atelectasis appear similar.  No focal consolidation.  No effusion.  Aortic atherosclerosis and tortuosity.  Cardiomediastinal silhouette is stable in size and configuration.  Degenerative changes of the spine and shoulders with stable postoperative changes of the chest.    Assessment / Plan:       Discussed diagnosis, its evaluation, treatment and usual course. All questions answered.    Problem List Items Addressed This Visit        Pulmonary    Pulmonary hypertension due to left heart valvular disease (Chronic)    Current Assessment & Plan       Plan of management per CARDIOLOGY (Dr. Daigle)  Preload and afterload reduction [  DUARTE NORVASC]  Dietary salt restriction.  Alcohol and tobacco avoidance.         Moderate persistent asthma without complication    Current Assessment & Plan     Asthma ROS: Taking medications as instructed, no medication side effects noted  New concerns: None.   Exam: appears well, vitals normal, no respiratory distress, acyanotic, normal RR, chest clear, no wheezing, crepitations, rhonchi, normal symmetric air entry.   Assessment:  Asthma well controlled.   Plan:  BREO,SINGULAIR, VENTOLIN and PROVENTIL. Current treatment plan is effective. CXR in 6 months. MEDS refilled.           Relevant Medications    albuterol (ACCUNEB) 0.63 mg/3 mL Nebu    fluticasone furoate-vilanterol (BREO ELLIPTA) 200-25 mcg/dose DsDv diskus inhaler    montelukast (SINGULAIR) 10 mg tablet    Other Relevant Orders    X-Ray Chest PA And Lateral    CRLD (chronic restrictive lung disease)    Current Assessment & Plan     Etiology unclear.  Multifactorial etiology suspected.  Likely contributors to etiology (checked)    [] Pulmonary airway disease    []  Pulmonary parenchymal disease ( sarcoidosis)  [] Pulmonary vascular disease   [] Pleural disease  [] Pulmonary vasculitis  [] Hypoventilation ( chest wall deformity, neuromuscular disease, obesity etc)  [] Anemia  [] Thyroid disease.  [x] Cardiac illess  []         Sleep disorder  [x]         Body Habitus (Obesity)    CRLD ROS: no significant ongoing wheezing or shortness of breath.   New concerns: None.   Exam: appears well, vitals normal, no respiratory distress, acyanotic, normal RR.   Assessment:  CRLD stable.   Plan: PROVENTIL, VENTOLIN. Current treatment plan is effective, no change in therapy.                Cardiac/Vascular    Moderate aortic valve stenosis S/P AVR12/31/2014 (Chronic)    Overview     TRIFECTA TISSUE HEART VALVE  Serail # [id number]  Model # TF - 21A  Implant Date: 12/31/14  Dr. Burak Orlando         Current Assessment & Plan     Plan of management per CARDIOLOGY (  ANNIE)            Endocrine    Morbid obesity with BMI of 50.0-59.9, adult (Chronic)    Current Assessment & Plan     General weight loss/lifestyle modification strategies discussed (elicit support from others; identify saboteurs; non-food rewards, etc).  Diet interventions: low calorie (1000 kCal/d) deficit diet.            Other    JACQUELINE on CPAP - Primary (Chronic)    Current Assessment & Plan     Continue CPAP of 13. (ALONZO SOLITARIO)     Discussed therapeutic goals for positive airway pressure therapy(CPAP or BiPAP): Ideal is usage 100% of nights for 6 - 8 hours per night. Minimum usage is 70% of night for at least 4 hours per night used Pateint expressed understanding. All Questions answered.    Complications of untreated sleep apnea discussed with pateint in detail including but not limited to  high blood pressure, heart attack, stroke, obesity,  diabetes and worsening heart failure. Patient voiced understanding.    CPAP supplies,    ONSAT on CPAP.                 TIME SPENT WITH PATIENT: Time spent: 40 minutes in face to face  discussion concerning diagnosis, prognosis, review of lab and test results, benefits of treatment as well as management of disease, counseling of patient and coordination of care between various health  care providers . Greater than half the time spent was used for coordination of care and counseling of patient.       Follow up in about 6 months (around 8/19/2020) for Asthma, Morbid Obesity, JACQUELINE.

## 2020-02-19 NOTE — PROCEDURES
[facility] - Pulm Function [facility]  Six Minute Walk     SUMMARY     Ordering Provider: Dr. Mazariegos   Interpreting Provider: Dr. Mazariegos  Performing nurse/tech/RT: ALEXEY Crane crt  Diagnosis: (asthma)  Height: 5' (152.4 cm)  Weight: 117.7 kg (259 lb 7.7 oz)  BMI (Calculated): 50.7   Patient Race:             Phase Oxygen Assessment Supplemental O2 Heart   Rate Blood Pressure Miguel Dyspnea Scale Rating   Resting 95 % Room Air 71 bpm 129/59 3   Exercise        Minute        1 94 % Room Air 107 bpm     2 92 % Room Air 103 bpm     3 94 % Room Air 86 bpm     4 95 % Room Air 94 bpm     5 92 % Room Air 107 bpm     6  92 % Room Air 105 bpm 175/75 7-8   Recovery        Minute        1 95 % Room Air 82 bpm     2 96 % Room Air 78 bpm     3 97 % Room Air 78 bpm     4 96 % Room Air 75 bpm 167/56 4     Six Minute Walk Summary  6MWT Status: completed with stops  Patient Reported: Dyspnea     Interpretation:  Did the patient stop or pause?: Yes  How many times did the patient stop or pause?: 1  Stop Time 1: 131  Restart Time 1: 198  Pause Time 1: 67 seconds                             Total Time Walked (Calculated): 293 seconds  Final Partial Lap Distance (feet): 125 feet  Total Distance Meters (Calculated): 220.98 meters  Predicted Distance Meters (Calculated): 291.31 meters  Percentage of Predicted (Calculated): 75.86  Peak VO2 (Calculated): 10.61  Mets: 3.03  Has The Patient Had a Previous Six Minute Walk Test?: Yes       Previous 6MWT Results  Has The Patient Had a Previous Six Minute Walk Test?: Yes  Date of Previous Test: 01/18/19  Total Time Walked: 251 seconds  Total Distance (meters): 167.64  Predicted Distance (meters): 297.32 meters  Percentage of Predicted: 56.38  Percent Change (Calculated): -0.32      PHYSICIAN INTERPRETATION    Six minute walk distance is 220.98 / 291.31 meters (75.86 % predicted) with very heavy dyspnea.   Peak VO2 during walking was 10.61  Ml/min/kg [ 3.03 METS].   During exercise, there was  significant desaturation while breathing room air.  Lowest oxygen saturation during walking was 92 %.   Both blood pressure and heart rate increased significantly with walking.  Normotension was present prior to exercise.  This may represent an abnormal cardiovascular response to exercise.  The patient did not report non-pulmonary symptoms during exercise.  There was no significant exercise impairment during walking.  The patient did complete the study, walking 293 seconds of the 360 second test.  The patient did not require oxygen supplementation during walking.  Since the previous study in 01/18/19, exercise capacity is significantly improved ( 32%)  Based upon age and body mass index, exercise capacity is normal.

## 2020-02-19 NOTE — ASSESSMENT & PLAN NOTE
Etiology unclear.  Multifactorial etiology suspected.  Likely contributors to etiology (checked)    [] Pulmonary airway disease    []  Pulmonary parenchymal disease ( sarcoidosis)  [] Pulmonary vascular disease   [] Pleural disease  [] Pulmonary vasculitis  [] Hypoventilation ( chest wall deformity, neuromuscular disease, obesity etc)  [] Anemia  [] Thyroid disease.  [x] Cardiac illess  []         Sleep disorder  [x]         Body Habitus (Obesity)    CRLD ROS: no significant ongoing wheezing or shortness of breath.   New concerns: None.   Exam: appears well, vitals normal, no respiratory distress, acyanotic, normal RR.   Assessment:  CRLD stable.   Plan: PROVENTIL, VENTOLIN. Current treatment plan is effective, no change in therapy.

## 2020-02-20 LAB
BRPFT: ABNORMAL
DLCO ADJ PRE: 10.83 ML/(MIN*MMHG) (ref 12.38–23.85)
DLCO SINGLE BREATH LLN: 12.38
DLCO SINGLE BREATH PRE REF: 59.8 %
DLCO SINGLE BREATH REF: 18.12
DLCOC SBVA LLN: 2.59
DLCOC SBVA PRE REF: 110.8 %
DLCOC SBVA REF: 4.27
DLCOC SINGLE BREATH LLN: 12.38
DLCOC SINGLE BREATH PRE REF: 59.8 %
DLCOC SINGLE BREATH REF: 18.12
DLCOVA LLN: 2.59
DLCOVA PRE REF: 110.8 %
DLCOVA PRE: 4.73 ML/(MIN*MMHG*L) (ref 2.59–5.95)
DLCOVA REF: 4.27
DLVAADJ PRE: 4.73 ML/(MIN*MMHG*L) (ref 2.59–5.95)
ERV LLN: 0.54
ERV PRE REF: 27.6 %
ERV REF: 0.54
FEF 25 75 LLN: 0.49
FEF 25 75 PRE REF: 89.3 %
FEF 25 75 REF: 1.38
FEV1 FVC LLN: 65
FEV1 FVC PRE REF: 104.9 %
FEV1 FVC REF: 79
FEV1 LLN: 1.06
FEV1 PRE REF: 70.1 %
FEV1 REF: 1.55
FRCPLETH LLN: 1.66
FRCPLETH PREREF: 83.4 %
FRCPLETH REF: 2.48
FVC LLN: 1.38
FVC PRE REF: 66.4 %
FVC REF: 1.99
IVC PRE: 1.26 L (ref 1.38–2.6)
IVC SINGLE BREATH LLN: 1.38
IVC SINGLE BREATH PRE REF: 63.5 %
IVC SINGLE BREATH REF: 1.99
MVV LLN: 53
MVV PRE REF: 77.2 %
MVV REF: 62
PEF LLN: 2.09
PEF PRE REF: 76.4 %
PEF REF: 3.84
PRE DLCO: 10.83 ML/(MIN*MMHG) (ref 12.38–23.85)
PRE ERV: 0.15 L (ref 0.54–0.54)
PRE FEF 25 75: 1.23 L/S (ref 0.49–2.27)
PRE FET 100: 7.26 SEC
PRE FEV1 FVC: 82.44 % (ref 64.97–92.2)
PRE FEV1: 1.09 L (ref 1.06–2.04)
PRE FRC PL: 2.07 L
PRE FVC: 1.32 L (ref 1.38–2.6)
PRE MVV: 48 L/MIN (ref 52.84–71.48)
PRE PEF: 2.94 L/S (ref 2.09–5.6)
PRE RV: 1.87 L (ref 1.36–2.51)
PRE TLC: 3.29 L (ref 3.25–5.23)
RAW LLN: 3.06
RAW PRE REF: 201.8 %
RAW PRE: 6.17 CMH2O*S/L (ref 3.06–3.06)
RAW REF: 3.06
RV LLN: 1.36
RV PRE REF: 96.8 %
RV REF: 1.94
RVTLC LLN: 35
RVTLC PRE REF: 129.2 %
RVTLC PRE: 57.01 % (ref 34.53–53.71)
RVTLC REF: 44
TLC LLN: 3.25
TLC PRE REF: 77.5 %
TLC REF: 4.24
VA PRE: 2.32 L (ref 4.09–4.09)
VA SINGLE BREATH LLN: 4.09
VA SINGLE BREATH PRE REF: 56.6 %
VA SINGLE BREATH REF: 4.09
VC LLN: 1.38
VC PRE REF: 71.1 %
VC PRE: 1.41 L (ref 1.38–2.6)
VC REF: 1.99
VTGRAWPRE: 2.24 L

## 2020-02-26 ENCOUNTER — OFFICE VISIT (OUTPATIENT)
Dept: ORTHOPEDICS | Facility: CLINIC | Age: 75
End: 2020-02-26
Payer: MEDICARE

## 2020-02-26 VITALS
HEART RATE: 77 BPM | SYSTOLIC BLOOD PRESSURE: 128 MMHG | BODY MASS INDEX: 50.85 KG/M2 | WEIGHT: 259 LBS | DIASTOLIC BLOOD PRESSURE: 67 MMHG | HEIGHT: 60 IN

## 2020-02-26 DIAGNOSIS — M19.011 LOCALIZED OSTEOARTHRITIS OF RIGHT SHOULDER: Primary | ICD-10-CM

## 2020-02-26 DIAGNOSIS — M19.011 ARTHRITIS OF RIGHT ACROMIOCLAVICULAR JOINT: ICD-10-CM

## 2020-02-26 PROCEDURE — 3078F PR MOST RECENT DIASTOLIC BLOOD PRESSURE < 80 MM HG: ICD-10-PCS | Mod: CPTII,S$GLB,, | Performed by: ORTHOPAEDIC SURGERY

## 2020-02-26 PROCEDURE — 1101F PR PT FALLS ASSESS DOC 0-1 FALLS W/OUT INJ PAST YR: ICD-10-PCS | Mod: CPTII,S$GLB,, | Performed by: ORTHOPAEDIC SURGERY

## 2020-02-26 PROCEDURE — 20610 DRAIN/INJ JOINT/BURSA W/O US: CPT | Mod: RT,S$GLB,, | Performed by: ORTHOPAEDIC SURGERY

## 2020-02-26 PROCEDURE — 1125F AMNT PAIN NOTED PAIN PRSNT: CPT | Mod: S$GLB,,, | Performed by: ORTHOPAEDIC SURGERY

## 2020-02-26 PROCEDURE — 99214 PR OFFICE/OUTPT VISIT, EST, LEVL IV, 30-39 MIN: ICD-10-PCS | Mod: 25,S$GLB,, | Performed by: ORTHOPAEDIC SURGERY

## 2020-02-26 PROCEDURE — 99999 PR PBB SHADOW E&M-EST. PATIENT-LVL III: ICD-10-PCS | Mod: PBBFAC,,, | Performed by: ORTHOPAEDIC SURGERY

## 2020-02-26 PROCEDURE — 1159F MED LIST DOCD IN RCRD: CPT | Mod: S$GLB,,, | Performed by: ORTHOPAEDIC SURGERY

## 2020-02-26 PROCEDURE — 20610 PR DRAIN/INJECT LARGE JOINT/BURSA: ICD-10-PCS | Mod: RT,S$GLB,, | Performed by: ORTHOPAEDIC SURGERY

## 2020-02-26 PROCEDURE — 99999 PR PBB SHADOW E&M-EST. PATIENT-LVL III: CPT | Mod: PBBFAC,,, | Performed by: ORTHOPAEDIC SURGERY

## 2020-02-26 PROCEDURE — 1101F PT FALLS ASSESS-DOCD LE1/YR: CPT | Mod: CPTII,S$GLB,, | Performed by: ORTHOPAEDIC SURGERY

## 2020-02-26 PROCEDURE — 3078F DIAST BP <80 MM HG: CPT | Mod: CPTII,S$GLB,, | Performed by: ORTHOPAEDIC SURGERY

## 2020-02-26 PROCEDURE — 3074F PR MOST RECENT SYSTOLIC BLOOD PRESSURE < 130 MM HG: ICD-10-PCS | Mod: CPTII,S$GLB,, | Performed by: ORTHOPAEDIC SURGERY

## 2020-02-26 PROCEDURE — 1159F PR MEDICATION LIST DOCUMENTED IN MEDICAL RECORD: ICD-10-PCS | Mod: S$GLB,,, | Performed by: ORTHOPAEDIC SURGERY

## 2020-02-26 PROCEDURE — 1125F PR PAIN SEVERITY QUANTIFIED, PAIN PRESENT: ICD-10-PCS | Mod: S$GLB,,, | Performed by: ORTHOPAEDIC SURGERY

## 2020-02-26 PROCEDURE — 99214 OFFICE O/P EST MOD 30 MIN: CPT | Mod: 25,S$GLB,, | Performed by: ORTHOPAEDIC SURGERY

## 2020-02-26 PROCEDURE — 3074F SYST BP LT 130 MM HG: CPT | Mod: CPTII,S$GLB,, | Performed by: ORTHOPAEDIC SURGERY

## 2020-02-26 RX ORDER — TRIAMCINOLONE ACETONIDE 40 MG/ML
40 INJECTION, SUSPENSION INTRA-ARTICULAR; INTRAMUSCULAR
Status: COMPLETED | OUTPATIENT
Start: 2020-02-26 | End: 2020-02-26

## 2020-02-26 RX ADMIN — TRIAMCINOLONE ACETONIDE 40 MG: 40 INJECTION, SUSPENSION INTRA-ARTICULAR; INTRAMUSCULAR at 05:02

## 2020-02-26 NOTE — LETTER
February 26, 2020      Shankar Campo MD  [address] [place] [address] [address] [place] [address]           [facility]  [address] [facility]  [place] [address]  Phone: [phone]  Fax: [phone]          Patient: Saira العراقي   MR Number: [id number]   YOB: 1945   Date of Visit: 2/26/2020       Dear Dr. Shankar Campo:    Thank you for referring Saira العراقي to me for evaluation. Attached you will find relevant portions of my assessment and plan of care.    If you have questions, please do not hesitate to call me. I look forward to following Saira العراقي along with you.    Sincerely,    Clayton Cowart MD    Enclosure  CC:  No Recipients    If you would like to receive this communication electronically, please contact [email or web address] or [phone] to request more information on [employer] access.    For providers and/or their staff who would like to refer a patient to [facility], please contact us through our one-stop-shop provider referral line, [facility], at [phone].    If you feel you have received this communication in error or would no longer like to receive these types of communications, please e-mail [email or web address]

## 2020-02-26 NOTE — PATIENT INSTRUCTIONS
Reverse Total Shoulder Replacement  Reverse total shoulder replacement is a type of surgery. Its done to repair an injury to the rotator cuff.  Understanding the shoulder joint  The shoulder joint is where the ball-shaped part of the upper arm bone (humerus) meets the cup-shaped socket of the shoulder blade (scapula). A group of muscles and tendons hold the joint together. These muscles and tendons are called the rotator cuff. The muscles let you move your arm and shoulder.  Why reverse total shoulder replacement is done  The surgery may be needed if you have a complete tear of your rotator cuff. The tear may cause long-term problems with your shoulder joint. This is called cuff tear arthropathy. You may need reverse total shoulder replacement surgery if you have any of these:  · A complete tear in your rotator cuff  · Joint problems from the cuff tear (cuff tear arthropathy)  · Previous total shoulder replacement surgery that didnt relieve symptoms  · Severe pain and trouble moving your shoulder  · No success relieving your symptoms with treatments such as rest, medicines, cortisone injections, or physical therapy  How reverse total shoulder replacement is done  The surgery is the opposite (reverse) of standard total shoulder replacement surgery:  · In the standard surgery, the ball of the humerus is replaced with an artificial ball. The socket of the scapula is replaced with an artificial socket. The new joint still uses the rotator cuff muscles to move the arm and shoulder.  · With the reverse surgery, the ball of the humerus is replaced with an artificial socket. The socket of the scapula is replaced with an artificial ball. Since the rotator cuff muscles are damaged, another muscle (deltoid) moves the arm and shoulder.  Risks of reverse total shoulder replacement  Every surgery has risks. Risks for this surgery include:  · Infection  · Blood loss  · Damage to nerves that may make it hard to move your  arm  · Damage to the humerus or scapula  · Artificial joint moving out of position (dislocation)  · Problems with general anesthesia  Some risks may be higher if you have had shoulder surgery in the past. Your risks may vary depending on your shoulder problem and your overall health. Talk with your doctor about which risks apply most to you.  Date Last Reviewed: 6/16/2015 © 2000-2017 [employer]. [address] [place] [address], [place] [address]. All rights reserved. This information is not intended as a substitute for professional medical care. Always follow your healthcare professional's instructions.        After Reverse Total Shoulder Replacement  Reverse total shoulder replacement is a type of surgery. Its done to repair an injury to the rotator cuff. The rotator cuff is a group of muscles and tendons that hold the shoulder joint together.  After your surgery  After the procedure, you will spend several hours in a recovery room. You may be sleepy and confused when you wake up. Your health care team will watch your vital signs, such as your heart rate and breathing. Your arm and shoulder will feel numb if you had regional anesthesia. This will begin to wear off over several hours. Youll be given pain medicine if you need it. You may also be given antibiotic medicine. This is to help prevent infection.  Youll likely be in the hospital for 2 to 3 days. To help lessen pain and swelling, a cooling device may be used on your shoulder. You will likely have follow-up X-rays. These are to make sure the artificial ball and socket are in place.  You may begin physical therapy in the hospital. This is to help you regain strength and movement.  Recovering at home  Follow all the instructions your health care provider gives you for medicines, exercise, diet, and wound care. Your arm will probably be in a sling for several weeks. You will continue to have some pain as you heal. But any pain you had from the rotator  cuff injury and joint damage should be gone.  Continue to use the cooling device on your shoulder. Do physical therapy as instructed. Limit the use of your arm and shoulder as instructed. Your doctor will tell you when you can return to normal activities.  Follow-up care  Make sure to keep all of your follow-up appointments with your surgeon and with your physical therapist. Most joints last for several years before they need to be replaced. Talk with your surgeon about what he or she expects for you.     When to call your health care provider  Call your health care provider right away if any of these occur:  · Fever of 100.4°F (38°C) or higher  · Redness, swelling, or fluid leaking from your incision that gets worse  · Pain that gets worse  · Symptoms that dont get better, or get worse  · New symptoms   Date Last Reviewed: 6/16/2015  © 2709-5269 [employer]. [address] [place] [address]. All rights reserved. This information is not intended as a substitute for professional medical care. Always follow your healthcare professional's instructions.        Having Reverse Total Shoulder Replacement  Reverse total shoulder replacement is a type of surgery. Its done to repair an injury to the rotator cuff. The rotator cuff is a group of muscles and tendons that hold the shoulder joint together.  What to tell your health care provider  Before your surgery, tell your health care provider:  · What medicines you take. This includes over-the-counter medicines such as ibuprofen. It also includes vitamins, herbs, and other supplements. You may need to stop taking some medicines before the procedure, such as blood thinners and aspirin.  · If you smoke. You may need to stop before your surgery. Smoking can delay healing. Talk with your health care provider if you need help to stop smoking.  · If youve had recent changes in your health. This includes an infection or fever.  · If you are sensitive or  allergic to anything. This includes medicines, latex, tape, and anesthetic medicines.  · If you are pregnant. Also tell your health care provider if you think you may be pregnant.  Getting ready for your surgery  Make sure to:  · Ask a family member or friend to take you home from the hospital  · Make plans for some help at home while you recover  · Follow all other instructions from your health care provider  · Read the consent form and ask questions if something is not clear  · Not eat or drink after midnight before your surgery  Tests before your surgery  Before your surgery, you may need tests such as:  · Shoulder X-rays. These are done to view the bones of your shoulder.  · CT scan. This test uses a series of X-rays and a computer to show your shoulder in more detail.  · MRI. This test uses large magnets, radio waves, and a computer to see the bones, muscles, and tendons in more detail.  On the day of your surgery  Your procedure will be done by an orthopedic surgeon. This is a doctor who specializes in treating bones and muscles. He or she will work with a team of specialized nurses. The surgery can be done in several ways. Ask your doctor about the details of your surgery. The whole procedure may take a few hours. In general, you can expect the following:  · You will have general anesthesia, a medicine that allows you to sleep through the surgery. You wont feel any pain during the surgery. Or you will be given medicine (sedation) to make you relaxed and sleepy during the procedure. You will be given medicine that blocks pain in the area (regional anesthesia).  · A health care provider will watch your heart rate, blood pressure, and other vital signs during the surgery.  · The surgeon will make a cut through the skin on the front or on the top of your shoulder. He or she will then cut through the layer of muscle beneath.  · The damaged sections of the humerus and the scapula will be removed.  · The artificial  ball will be attached to your scapula with special screws. The socket will be attached to the top of the upper arm bone.  · A tube may be placed in the joint to drain extra fluid. This tube will be taken out later.  · The muscles and skin will be closed with stitches or staples.  Date Last Reviewed: 6/16/2015  © 1355-8433 [employer]. [address] [place] [address]. All rights reserved. This information is not intended as a substitute for professional medical care. Always follow your healthcare professional's instructions.        Total Shoulder Replacement Surgery  During shoulder replacement surgery, all or part of your problem shoulder is replaced with an artificial joint, called a prosthesis. The prosthesis replaces the rough, worn parts of your shoulder with smooth metal and plastic parts.    Before your surgery  You will most likely arrive at the hospital on the morning of the surgery. Be sure to follow all of your doctors instructions on preparing for surgery.  · Follow any directions you are given for taking medicines or for not eating or drinking before surgery.  · At the hospital, your temperature, pulse, breathing, and blood pressure will be checked.  · An IV (intravenous) line will be started to provide fluids and medications needed during surgery.  The surgical procedure  When the surgical team is ready, youll be taken to the operating room. There youll be given anesthesia to help you sleep through surgery. Your surgeon may replace just the ball (partial replacement) or both the ball and the socket (total replacement). An incision about six inches long is made from your collarbone to your arm. Once the new joint is in place, your surgeon closes the incision with surgical staples or sutures (stitches).  After your surgery  After surgery, youll be sent to the PACU (postanesthesia care unit). When you are fully awake, youll be moved to your room. The nurses will give you  medications to ease your pain. Soon, health care providers will help you get up and moving. You may also have physical therapy after surgery.  You may need to wear an arm sling for a few weeks.  When to call your doctor  Once at home, call your doctor if you have any of the symptoms below:  · An increase in pain not relieved by your pain medicine  · Unusual redness, heat, or drainage at the incision site  · Fever over 100.4°F (38°C)   Date Last Reviewed: 9/10/2015  © 4331-2301 [employer]. [address] [place] [address]. All rights reserved. This information is not intended as a substitute for professional medical care. Always follow your healthcare professional's instructions.

## 2020-02-26 NOTE — PROGRESS NOTES
Subjective:     Patient ID: Saira العراقي is a 74 y.o. female.    Chief Complaint: Pain of the Right Shoulder    Saira العراقي is here today for right shoulder pain. She had two shoulder arthroscopies done by Dr. Teixeira. She denies any falls/injury in the last 6 months.  Has done well with shoulder steroid injections approximately 3-4 months of pain relief.  She is pain management at with Dr. Kai Ventura.    Shoulder Pain    The pain is present in the right shoulder. The current episode started more than 1 year ago. The problem occurs constantly. The problem has been gradually worsening. The quality of the pain is described as aching. The pain is at a severity of 5/10. Pertinent negatives include no fever or numbness. The symptoms are aggravated by activity, lifting and rotation. She has tried NSAIDs for the symptoms. The treatment provided mild relief.       Past Medical History:   Diagnosis Date    Arthritis     Asthma     COPD (chronic obstructive pulmonary disease)     Diabetes mellitus     Encounter for blood transfusion     Hypertension     Lung disease     Sleep apnea      Past Surgical History:   Procedure Laterality Date    APPENDECTOMY      CARDIAC SURGERY      valve replacement 12/2014    CATARACT EXTRACTION W/ INTRAOCULAR LENS  IMPLANT, BILATERAL      CHOLECYSTECTOMY      CYST REMOVAL      HERNIA REPAIR      left, 3/2015    HYSTERECTOMY      KNEE ARTHROPLASTY      shoulder       Family History   Problem Relation Age of Onset    Cancer Mother     Diabetes Mother     Hypertension Mother     Cancer Father     Hypertension Father     Heart failure Maternal Grandfather     Heart failure Son      Social History     Socioeconomic History    Marital status:      Spouse name: Not on file    Number of children: Not on file    Years of education: Not on file    Highest education level: Not on file   Occupational History    Not on file   Social Needs    Financial  resource strain: Not on file    Food insecurity:     Worry: Not on file     Inability: Not on file    Transportation needs:     Medical: Not on file     Non-medical: Not on file   Tobacco Use    Smoking status: Former Smoker     Packs/day: 1.00     Years: 50.00     Pack years: 50.00     Last attempt to quit: 2013     Years since quittin.2    Smokeless tobacco: Never Used   Substance and Sexual Activity    Alcohol use: No    Drug use: No    Sexual activity: Not on file   Lifestyle    Physical activity:     Days per week: Not on file     Minutes per session: Not on file    Stress: Not on file   Relationships    Social connections:     Talks on phone: Not on file     Gets together: Not on file     Attends Anabaptist service: Not on file     Active member of club or organization: Not on file     Attends meetings of clubs or organizations: Not on file     Relationship status: Not on file   Other Topics Concern    Not on file   Social History Narrative    Not on file     Medication List with Changes/Refills   Current Medications    ALBUTEROL (ACCUNEB) 0.63 MG/3 ML NEBU    INHALE 1 VIAL VIA NEBULIZER EVERY 6 HOURS AS NEEDED    ALBUTEROL (VENTOLIN HFA) 90 MCG/ACTUATION INHALER    Inhale 2 puffs into the lungs every 6 (six) hours as needed for Wheezing. Rescue    ALCOHOL PREP PADS PADM        ALENDRONATE (FOSAMAX) 70 MG TABLET    Take 70 mg by mouth once a week.    AMLODIPINE (NORVASC) 10 MG TABLET    Take 10 mg by mouth once daily.     AMMONIUM LACTATE 12 % CREA    Apply 1 Applicatorful topically.    ASPIRIN (ECOTRIN) 81 MG EC TABLET    Take 81 mg by mouth once daily.    ATORVASTATIN (LIPITOR) 40 MG TABLET    Take 40 mg by mouth once daily.     BENICAR 40 MG TABLET    Take 40 mg by mouth once daily.     CALCIUM CITRATE-VITAMIN D3 315-200 MG (CITRACAL+D) 315-200 MG-UNIT PER TABLET    Take 1 tablet by mouth once daily.     CLEVER CHOICE BLOOD GLUC SYS MISC        DICLOFENAC SODIUM 1 % GEL    Apply 2 g  topically 4 (four) times daily.    [employer] BLOOD GLUCOSE SYSTEM STRP        FLUAD 1040-4013, 65 YR UP,,PF, 45 MCG (15 MCG X 3)/0.5 ML SYRG        FLUTICASONE FUROATE-VILANTEROL (BREO ELLIPTA) 200-25 MCG/DOSE DSDV DISKUS INHALER    Inhale 1 puff into the lungs once daily.    FLUTICASONE PROPIONATE (FLONASE) 50 MCG/ACTUATION NASAL SPRAY        FUROSEMIDE (LASIX) 40 MG TABLET    TAKE 1 TABLET BY MOUTH TWICE A DAY    GABAPENTIN (NEURONTIN) 300 MG CAPSULE    Take 300 mg by mouth 2 (two) times daily.     GLUCOSAMINE-D3-BOSWELLIA SERR (OSTEO BI-FLEX, 5-LOXIN,) 1,500-400-100 MG-UNIT-MG TAB    Take 1 tablet by mouth daily as needed.    HYDROCODONE-ACETAMINOPHEN (NORCO)  MG PER TABLET    Take 1 tablet by mouth daily as needed.    LANCING DEVICE WITH LANCETS MISC        LIDOCAINE-PRILOCAINE (EMLA) CREAM    as needed.     LITE TOUCH LANCETS 30 GAUGE MISC        METFORMIN (GLUCOPHAGE) 500 MG TABLET    Take 500 mg by mouth 2 (two) times daily with meals.    METOLAZONE (ZAROXOLYN) 2.5 MG TABLET    Take 1 tablet by mouth once daily.    METOPROLOL TARTRATE (LOPRESSOR) 50 MG TABLET    Take 1 tablet by mouth 2 (two) times daily.    MONTELUKAST (SINGULAIR) 10 MG TABLET    Take 1 tablet (10 mg total) by mouth every evening.    OMEGA-3 FATTY ACIDS 500 MG CAP    Take 1 capsule by mouth.    POTASSIUM CHLORIDE SA (K-DUR,KLOR-CON) 20 MEQ TABLET    Take 1 tablet by mouth once daily.    TIOTROPIUM BROMIDE (SPIRIVA RESPIMAT) 2.5 MCG/ACTUATION MIST    Inhale 2 puffs into the lungs once daily. Controller    VITAMIN E 1000 UNIT CAPSULE    Take 1,000 Units by mouth once daily.     Review of patient's allergies indicates:   Allergen Reactions    Ace inhibitors Anaphylaxis    Ultram [tramadol] Anaphylaxis     Review of Systems   Constitution: Negative for chills and fever.   HENT: Negative for ear discharge and hearing loss.    Eyes: Negative for blurred vision and visual disturbance.   Cardiovascular: Negative for chest pain and leg  swelling.   Respiratory: Negative for cough and shortness of breath.    Endocrine: Negative for polyuria.   Hematologic/Lymphatic: Negative for bleeding problem.   Skin: Negative for rash.   Musculoskeletal: Positive for joint pain. Negative for back pain, joint swelling, muscle cramps and muscle weakness.   Gastrointestinal: Negative for nausea and vomiting.   Genitourinary: Negative for hematuria.   Neurological: Negative for loss of balance, numbness and paresthesias.   Psychiatric/Behavioral: Negative for altered mental status.       Objective:   Body mass index is 50.58 kg/m².  Vitals:    02/26/20 0946   BP: 128/67   Pulse: 77                General    Vitals reviewed.  Constitutional: She is oriented to person, place, and time. She appears well-developed and well-nourished. No distress.   HENT:   Head: Atraumatic.   Nose: Nose normal.   Eyes: EOM are normal. Right eye exhibits no discharge. Left eye exhibits no discharge.   Neck: Neck supple.   Cardiovascular: Normal rate and intact distal pulses.    Pulmonary/Chest: Effort normal. No respiratory distress.   Neurological: She is alert and oriented to person, place, and time. Coordination normal.   Psychiatric: She has a normal mood and affect. Her behavior is normal. Judgment and thought content normal.         Right Shoulder Exam     Inspection/Observation   Swelling: absent  Bruising: absent  Scars: present  Deformity: absent  Scapular Winging: absent  Scapular Dyskinesia: negative  Atrophy: absent    Tenderness   The patient is tender to palpation of the biceps tendon and acromioclavicular joint.    Range of Motion   Active abduction: 90   Passive abduction: 100   Extension: 0   Forward Flexion: 120   Forward Elevation: 130Adduction: 40   External Rotation 0 degrees: 30   Internal rotation 0 degrees: Sacrum     Tests & Signs   Drop arm: negative  Calhoun test: positive  Impingement: positive  Lift Off Sign: negative  Active Compression Test (Conway's  Sign): negative  Speed's Test: positive    Other   Sensation: normal    Left Shoulder Exam     Inspection/Observation   Swelling: absent  Bruising: absent  Scars: present  Deformity: absent  Scapular Winging: absent  Scapular Dyskinesia: negative  Atrophy: absent    Tenderness   The patient is experiencing no tenderness.     Range of Motion   Active abduction: 90   Passive abduction: 100   Extension: 0   Forward Flexion: 160   Forward Elevation: 160Adduction: 40   External Rotation 0 degrees: 40   Internal rotation 0 degrees: T8   Internal rotation 90 degrees: 30     Tests & Signs   Drop arm: negative  Calhoun test: negative  Impingement: negative  Lift Off Sign: negative  Active Compression test (Cleveland's Sign): negative  Speed's Test: negative  Bear Hug: negative    Other   Sensation: normal       Muscle Strength   Right Upper Extremity   Shoulder Abduction: 4/5   Shoulder Internal Rotation: 4/5   Shoulder External Rotation: 4/5   Supraspinatus: 4/5/5   Subscapularis: 4/5/5   Biceps: 4/5/5   Left Upper Extremity  Shoulder Abduction: 5/5   Shoulder Internal Rotation: 5/5   Shoulder External Rotation: 5/5   Supraspinatus: 5/5/5   Subscapularis: 5/5/5   Biceps: 5/5/5     Reflexes     Left Side  Biceps:  2+  Triceps:  2+    Right Side   Biceps:  2+  Triceps:  2+    Vascular Exam     Right Pulses      Radial:                    2+      Left Pulses      Radial:                    2+      Capillary Refill  Right Hand: normal capillary refill  Left Hand: normal capillary refill      Relevant imaging results reviewed and interpreted by me, discussed with the patient and / or family today   Robi Conner MD 12/13/2019 Routine      Narrative     EXAMINATION:  MRI ARTHROGRAM SHOULDER WITH CONTRAST RIGHT    CLINICAL HISTORY:  Pain in right shoulder.    TECHNIQUE:  MR arthrogram right shoulder performed multiplanar T1 and T2 fat-suppressed sequences.    COMPARISON:  Right shoulder x-ray 05/05/2017.    FINDINGS:  The  glenohumeral joint is well distended with contrast.    There is extensive rotator cuff tear with complete, full-thickness tears of the supraspinatus and infraspinatus tendons, with up to 2 cm tendon retraction.  There is no muscle belly atrophy.  As seen on the remote x-ray, there are bursal sided calcium deposits overlying the greater tuberosity at the level the torn supraspinatus.    There is also subscapularis tendinosis with high-grade partial thickness tearing, possible focal full-thickness tear, involving the upper subscapularis tendon.    Long head biceps tendon is intact with moderate tendinosis.    There is a SLAP tear extending into the posterosuperior quadrant with degenerative signal.  Labrum is otherwise unremarkable.    There is advanced glenohumeral osteoarthritis with prominent inferior humeral head spurring.  There is advanced chondromalacia with areas of full-thickness chondral loss humeral head and glenoid with associated subarticular cyst formation.    There is superior migration of the humeral head relative to the rotator cuff tear.  Normal IGHL.    Type 2 acromion status post subacromial decompression/distal acromioplasty.  Contrast fills the subacromial subdeltoid bursal space via the rotator cuff tear.    The bone marrow signal intensity is otherwise unremarkable.  The signal intensity of the musculature is groups is normal.      Impression       1. Extensive rotator cuff tearing with complete, full-thickness tears supraspinatus and infraspinatus, up to 2 cm tendon retraction, without muscle belly atrophy.  As seen on the remote x-ray, there are bursal sided calcium deposits overlying the greater tuberosity at the level the torn supraspinatus.  2. Subscapularis tendinosis with high-grade partial-thickness tearing, possible focal full-thickness tear, upper tendon.  3. Moderate long head biceps tendinosis without disruption.  4. SLAP tear extends into the posterosuperior quadrant.  5. Advanced  glenohumeral osteoarthritis/chondromalacia sequela, detailed above.  Superior migration humeral head.  6. Status post subacromial decompression/distal acromioplasty.      Electronically signed by: Robi Conner MD  Date: 12/13/2019  Time: 16:06          Assessment:     Encounter Diagnoses   Name Primary?    Localized osteoarthritis of right shoulder Yes    Arthritis of right acromioclavicular joint         Plan:     We reviewed with Saira today, the pathology and natural history of her diagnosis. We had an extensive discussion as to the conservative treatment and management of their condition. We also discussed the variety of treatment options to include medication, physical therapy, diagnostic testing as well as other treatments.The decision was made to go forward with:    Discussed the option of surgery with a reverse total shoulder replacement.  At this time patient declined any interest in having additional surgery and desired for repeat steroid injection.    -PT/OT  -Injection today  -F/up 6-8wk    PROCEDURE NOTE:  After time out was performed, including verification of patient ID, procedure, site and side, availability of information and equipment, review of safety issues, and agreement with consent, the procedure site was marked and the patient was prepped aseptically.  The shoulder was prepped with betadyne and alcohol. The right subacromial space was injected with 1 cc 40 mg kenalog and 2 cc lidocaine and 2 cc .5% marcaine.   Bandaid was applied. Patient was reminded to rest with RICE for 48 hours post injection and to call clinic immediately for any adverse side effects as explained in clinic today.            Disclaimer: This note was prepared using a voice recognition system and is likely to have sound alike errors within the text.

## 2020-03-04 DIAGNOSIS — J45.40 MODERATE PERSISTENT ASTHMA WITHOUT COMPLICATION: Primary | ICD-10-CM

## 2020-03-04 RX ORDER — ALBUTEROL SULFATE 90 UG/1
2 AEROSOL, METERED RESPIRATORY (INHALATION) EVERY 6 HOURS PRN
Qty: 18 G | Refills: 11 | Status: SHIPPED | OUTPATIENT
Start: 2020-03-04 | End: 2021-06-02 | Stop reason: SDUPTHER

## 2020-03-06 ENCOUNTER — CLINICAL SUPPORT (OUTPATIENT)
Dept: REHABILITATION | Facility: HOSPITAL | Age: 75
End: 2020-03-06
Attending: ORTHOPAEDIC SURGERY
Payer: MEDICARE

## 2020-03-06 DIAGNOSIS — M19.011 ARTHRITIS OF RIGHT ACROMIOCLAVICULAR JOINT: ICD-10-CM

## 2020-03-06 DIAGNOSIS — M19.011 LOCALIZED OSTEOARTHRITIS OF RIGHT SHOULDER: ICD-10-CM

## 2020-03-06 PROCEDURE — 97161 PT EVAL LOW COMPLEX 20 MIN: CPT

## 2020-03-06 PROCEDURE — 97140 MANUAL THERAPY 1/> REGIONS: CPT | Mod: 59

## 2020-03-06 NOTE — PLAN OF CARE
[facility]  Physical Therapy Initial Evaluation    Name: Saira العراقي  Clinic Number: [id number]    Therapy Diagnosis:   Encounter Diagnoses   Name Primary?    Localized osteoarthritis of right shoulder     Arthritis of right acromioclavicular joint      Physician: Clayton Cowart, *    Physician Orders: PT Eval and Treat   Medical Diagnosis from Referral: Localized osteoarthritis of right shoulder  Evaluation Date: 3/6/2020  Authorization Period Expiration: 4/3/20  Plan of Care Expiration: 4/6/20  Visit # / Visits authorized: 1/ 6    Time In: 11:30  Time Out: 12:30  Total Billable Time: 15 minutes    Precautions: Standard    Subjective   Date of onset: years with her R shoulder, recent exacerbation in the last couple of months   History of current condition - Saira reports: that she has arthritis pretty bad on the R shoulder and that the R side has been repaired with multiple tears in it in the last 5 years. Says she is going to get a total shoulder replacement in the future. Wants to try and therapy and injections first before she wants to do the total shoulder replacement. Anything over shoulder height or behind the back she has a lot of difficulty with. It feels okay when it is at her side and at rest.      Pain:  Current 0/10, worst 8/10, best 0/10   Location: right shoulder   Description: Aching, Throbbing and Sharp  Aggravating Factors: Flexing and Lifting  Easing Factors: heating pad and rest    Prior Therapy: yes for her R shoulder after her surgeries  Social History:  lives with their spouse  Occupation: retired  Prior Level of Function: MI with increased pain  Current Level of Function: MI with increased pain    Imaging, MRI studies: extensive RC tear with complete full thickness tears of supraspinatus and infraspinatus tendons    Medical History:   Past Medical History:   Diagnosis Date    Arthritis     Asthma     COPD (chronic obstructive pulmonary disease)      Diabetes mellitus     Encounter for blood transfusion     Hypertension     Lung disease     Sleep apnea        Surgical History:   Saira العراقي  has a past surgical history that includes Knee Arthroplasty; shoulder; Cyst Removal; Appendectomy; Hysterectomy; Cholecystectomy; Cardiac surgery; Hernia repair; and Cataract extraction w/ intraocular lens  implant, bilateral.    Medications:   Saira has a current medication list which includes the following prescription(s): albuterol, albuterol, alcohol prep pads, alendronate, amlodipine, ammonium lactate, aspirin, atorvastatin, benicar, calcium citrate-vitamin d3 315-200 mg, clever choice blood gluc sys, diclofenac sodium, embrace blood glucose system, fluad [id number] (65 yr up)(pf), fluticasone furoate-vilanterol, fluticasone propionate, furosemide, gabapentin, glucosamine-d3-boswellia serr, hydrocodone-acetaminophen, lancing device with lancets, lidocaine-prilocaine, lite touch lancets, metformin, metolazone, metoprolol tartrate, montelukast, omega-3 fatty acids, potassium chloride sa, tiotropium bromide, and vitamin e, and the following Facility-Administered Medications: sodium chloride 0.9%.    Allergies:   Review of patient's allergies indicates:   Allergen Reactions    Ace inhibitors Anaphylaxis    Ultram [tramadol] Anaphylaxis        Pts goals: avoid surgery as long as possible, get more function back to R shoulder, decrease pain    Objective       CMS Impairment/Limitation/Restriction for FOTO Shoulder Survey    Therapist reviewed FOTO scores for Saira العراقي on 3/6/2020.   FOTO documents entered into [employer] - see Media section.    Limitation Score: 55%  Category: Other    Current : CK = at least 40% but < 60% impaired, limited or restricted  Goal: CK = at least 40% but < 60% impaired, limited or restricted  Discharge: NA       Posture: Pt noted to present with forward head/rounded shoulder posture.    Scapular AROM standing: abducted to both sides,  difficulty with retracting     Shoulder ROM:   Active/Passive Joint Range Right Left   Flexion 50%/60% p! WNL   ABDuction 50%/60% p! WNL   External Rotation 50%/90%  WNL   Internal Rotation 50%/60% p! WNL   Extension WNL WNL     Cervical Spine AROM:   % Pain   % N   BB 75% N   RSB 75% N   LSB 75% Stretching   RR 80% N   LR 80% N     Strength:  Muscle  Right Left   Shoulder Flex 3+/5 p! 4/5   Shoulder Abduction 3+/5 p! 4/5   Elbow Flexors (C5) 4/5 4/5   Wrist Extensors (C6) 4/5 4/5   Elbow Extensors (C7) 4/5 4/5     Sensation: Intact  Reflexes: Intact    Special Test:  [place] pos  Neers  pos     Empty Can neg  Drop Arm neg     Speeds neg      Upper Limb Tension Test: negative    Tenderness to palpation:  Patient tender to palpate along R supraspinatus muscle belly and tendon     TREATMENT   Treatment Time In: 12:15  Treatment Time Out: 12:30  Total Treatment time separate from Evaluation: 15 minutes    Saira received therapeutic exercises to develop strength and posture for 5 minutes including:  Wall slide  Seated B ER against TB  Shoulder shrug holds    Saira received the following manual therapy techniques: Joint mobilizations and Soft tissue Mobilization were applied to the: R shoulder for 10 minutes, including:  Posterior and inferior mobilizations grade 3  STM to R UT    Home Exercises and Patient Education Provided    Education provided:   -Education on condition, HEP, and progression of rehab    Written Home Exercises Provided: yes.  Exercises were reviewed and Saira was able to demonstrate them prior to the end of the session.  Saira demonstrated good  understanding of the education provided.     See paper chart under Patient Instructions for exercises provided 3/6/2020.    Assessment   Saira is a 74 y.o. female referred to outpatient Physical Therapy with a medical diagnosis of localized osteoarthritis of right shoulder. Pt presents with decreased shoulder ROM, increased pain/adverse symptoms,  decreased strength, decreased tolerance to activities, and difficulty performing performing daily tasks. Patient needs skilled therapy in order to improve UE ROM, improve pain/adverse symptoms, improve tolerance to activity, and improve strength in order to improve quality of life.    Pt prognosis is Good.   Pt will benefit from skilled outpatient Physical Therapy to address the deficits stated above and in the chart below, provide pt/family education, and to maximize pt's level of independence.     Plan of care discussed with patient: Yes  Pt's spiritual, cultural and educational needs considered and patient is agreeable to the plan of care and goals as stated below:     Anticipated Barriers for therapy: chronicity of symptoms    Medical Necessity is demonstrated by the following  History  Co-morbidities and personal factors that may impact the plan of care Co-morbidities:   COPD/asthma    Personal Factors:   no deficits     low   Examination  Body Structures and Functions, activity limitations and participation restrictions that may impact the plan of care Body Regions:   upper extremities    Body Systems:    ROM  strength  motor control  motor learning    Participation Restrictions:   ADLs, wanted activities    Activity limitations:   Learning and applying knowledge  no deficits    General Tasks and Commands  no deficits    Communication  no deficits    Mobility  lifting and carrying objects  fine hand use (grasping/picking up)    Self care  no deficits    Domestic Life  shopping  cooking  doing house work (cleaning house, washing dishes, laundry)  assisting others    Interactions/Relationships  no deficits    Life Areas  no deficits    Community and Social Life  community life  recreation and leisure         low   Clinical Presentation stable and uncomplicated low   Decision Making/ Complexity Score: low     Goals:  Short Term Goals: In 4 weeks   1.I with HEP  2.Patient to increase GH ROM to 75% pain  free  3.Patient to increase MMT strength by 1/2 grade  4.Patient to have pain less than 4/10 at all times.    Long Term Goals: In 8 weeks  1. Patient to score less than 20% impaired on the foto  2. Patient to demo increase in UE strength to 4+/5  3. Patient to have decreased pain to 2/10 at all times.  4. Patient to demo increase GH ROM to 90% pain free  5. Patient to perform daily activities including lifting arm without limitation.      Plan   Plan of care Certification: 3/6/2020 to 4/6/20.    Outpatient Physical Therapy 2 times weekly for 8 weeks to include the following interventions: Electrical Stimulation TENS, Manual Therapy, Moist Heat/ Ice, Neuromuscular Re-ed, Patient Education, Therapeutic Activites and Therapeutic Exercise.     Jerri Granado, PT    Thank you for this referral.    These services are reasonable and necessary for the conditions set forth above while under my care.

## 2020-03-12 ENCOUNTER — CLINICAL SUPPORT (OUTPATIENT)
Dept: REHABILITATION | Facility: HOSPITAL | Age: 75
End: 2020-03-12
Payer: MEDICARE

## 2020-03-12 DIAGNOSIS — M19.011 ARTHRITIS OF RIGHT ACROMIOCLAVICULAR JOINT: ICD-10-CM

## 2020-03-12 DIAGNOSIS — M19.011 LOCALIZED OSTEOARTHRITIS OF RIGHT SHOULDER: Primary | ICD-10-CM

## 2020-03-12 PROCEDURE — 97110 THERAPEUTIC EXERCISES: CPT | Mod: CQ

## 2020-03-12 PROCEDURE — 97140 MANUAL THERAPY 1/> REGIONS: CPT | Mod: CQ

## 2020-03-12 NOTE — PROGRESS NOTES
Physical Therapy Assistant Daily Treatment Note     Name: Saira العراقي  Clinic Number: [id number]    Therapy Diagnosis:   Encounter Diagnoses   Name Primary?    Localized osteoarthritis of right shoulder Yes    Arthritis of right acromioclavicular joint      Physician: Clayton Cowart, *    Visit Date: 3/12/2020    Physician Orders: PT Eval and Treat   Medical Diagnosis from Referral: Localized osteoarthritis of right shoulder  Evaluation Date: 3/6/2020  Authorization Period Expiration: 4/3/20  Plan of Care Expiration: 4/6/20  Visit # / Visits authorized: 2/ 6    Time In: 11:15  Time Out: 12:15  Total Billable Time: 55 minutes    Precautions: Standard    Subjective     Pt reports: increased shoulder stiffness today.  She was compliant with home exercise program.  Response to previous treatment: decreased pain  Functional change: none yet    Pain: 0/10  Location: right shoulder      Objective     Saira received therapeutic exercises to develop strength, endurance and ROM for 40 minutes including:  UBE 3/3  Shoulder abduction slide on mat 2'  Pulleys 3/3 flexion and scaption  Open books 2'  Standing rows with GTB 3x10  Lat Pulldowns with GTB 3x10     Saira received the following manual therapy techniques: Joint mobilizations and Soft tissue Mobilization were applied to the: R shoulder for 15 minutes, including:  Posterior and inferior mobilizations grade 3  STM to R UT  Gentle PROM to R shoulder in all planes      Home Exercises Provided and Patient Education Provided     Education provided:   - proper exercise technique, HEP     Written Home Exercises Provided: yes.  Exercises were reviewed and Saira was able to demonstrate them prior to the end of the session.  Saira demonstrated good  understanding of the education provided.      See paper chart under Patient Instructions for exercises provided 3/6/2020.      Assessment     Pt. Had minimal complaints of pain during pulleys secondary to decreased  shoulder ROM. Decreased tenderness in shoulder after manual. She was able to complete all scapular stability and shoulder strengthening exercises well. She did require verbal cues for controlled movement.  Saira is progressing well towards her goals.   Pt prognosis is Good.     Pt will continue to benefit from skilled outpatient physical therapy to address the deficits listed in the problem list box on initial evaluation, provide pt/family education and to maximize pt's level of independence in the home and community environment.     Pt's spiritual, cultural and educational needs considered and pt agreeable to plan of care and goals.     Anticipated Barriers for therapy: chronicity of symptoms  Goals:  Short Term Goals: In 4 weeks   1.I with HEP progressing  2.Patient to increase GH ROM to 75% pain free progressing  3.Patient to increase MMT strength by 1/2 grade progressing  4.Patient to have pain less than 4/10 at all times. progressing     Long Term Goals: In 8 weeks  1. Patient to score less than 20% impaired on the foto  2. Patient to demo increase in UE strength to 4+/5  3. Patient to have decreased pain to 2/10 at all times.  4. Patient to demo increase GH ROM to 90% pain free  5. Patient to perform daily activities including lifting arm without limitation.        Plan   Plan of care Certification: 3/6/2020 to 4/6/20.     Outpatient Physical Therapy 2 times weekly for 8 weeks to include the following interventions: Electrical Stimulation TENS, Manual Therapy, Moist Heat/ Ice, Neuromuscular Re-ed, Patient Education, Therapeutic Activites and Therapeutic Exercise.       Harriet Calvillo, PTA

## 2020-03-13 ENCOUNTER — DOCUMENTATION ONLY (OUTPATIENT)
Dept: REHABILITATION | Facility: HOSPITAL | Age: 75
End: 2020-03-13

## 2020-03-13 NOTE — PROGRESS NOTES
PT/PTA met face to face to discuss pt's treatment plan and progress towards established goals. Pt will be seen by a physical therapist minimally every 6th visit or every 30 days.      Harriet Calvillo PTA

## 2020-03-17 ENCOUNTER — CLINICAL SUPPORT (OUTPATIENT)
Dept: REHABILITATION | Facility: HOSPITAL | Age: 75
End: 2020-03-17
Attending: ORTHOPAEDIC SURGERY
Payer: MEDICARE

## 2020-03-17 DIAGNOSIS — M19.011 LOCALIZED OSTEOARTHRITIS OF RIGHT SHOULDER: Primary | ICD-10-CM

## 2020-03-17 PROCEDURE — 97140 MANUAL THERAPY 1/> REGIONS: CPT

## 2020-03-17 PROCEDURE — 97110 THERAPEUTIC EXERCISES: CPT

## 2020-03-17 NOTE — PROGRESS NOTES
PHYSICAL THERAPY TREATMENT NOTE    Name: Saira العراقي  Clinic Number: [id number]    Therapy Diagnosis:   Encounter Diagnosis   Name Primary?    Localized osteoarthritis of right shoulder Yes     Physician: Clayton Cowart, *    Visit Date: 3/17/2020    Physician Orders: PT Eval and Treat   Medical Diagnosis from Referral: Localized osteoarthritis of right shoulder  Evaluation Date: 3/6/2020  Authorization Period Expiration: 4/3/20  Plan of Care Expiration: 4/6/20  Visit # / Visits authorized: 3/ 6    Time In: 11:50  Time Out: 12:50  Total Billable Time: 30 minutes    Precautions: Standard    Subjective     Pt reports: reports that her shoulder is moving a little better and is having less pain.  She was compliant with home exercise program.  Response to previous treatment: decreased pain  Functional change: none yet    Pain: 0/10  Location: right shoulder      Objective     Saira received therapeutic exercises to develop strength, endurance and ROM for 40 minutes including:  UBE 3/3  Shoulder abduction slide on mat 2'  Pulleys 3/3 flexion and scaption  Open books 2'  Standing rows with B TB 3x10  Lat Pulldowns with 40# 2 x 15  Serratus punch 2 x 15  Standing ER against TB     Saira received the following manual therapy techniques: Joint mobilizations and Soft tissue Mobilization were applied to the: R shoulder for 15 minutes, including:  Posterior and inferior mobilizations grade 3  STM to R UT  Gentle PROM to R shoulder in all planes    Home Exercises Provided and Patient Education Provided     Education provided:   - proper exercise technique, HEP     Written Home Exercises Provided: yes.  Exercises were reviewed and Saira was able to demonstrate them prior to the end of the session.  Saira demonstrated good  understanding of the education provided.      See paper chart under Patient Instructions for exercises provided 3/6/2020.      Assessment     Patient demonstrated better tolerance to  strengthening this session and improvement in mobility noted. Due to patient's age and other comorbidities, advised patient skilled therapy is not essential at this time and she could come back in 2-3 weeks with an updated. Patient reports she still wants to come, but went ahead and updated her HEP just in case.    Saira is progressing well towards her goals.   Pt prognosis is Good.     Pt will continue to benefit from skilled outpatient physical therapy to address the deficits listed in the problem list box on initial evaluation, provide pt/family education and to maximize pt's level of independence in the home and community environment.     Pt's spiritual, cultural and educational needs considered and pt agreeable to plan of care and goals.     Anticipated Barriers for therapy: chronicity of symptoms  Goals:  Short Term Goals: In 4 weeks   1.I with HEP progressing  2.Patient to increase GH ROM to 75% pain free progressing  3.Patient to increase MMT strength by 1/2 grade progressing  4.Patient to have pain less than 4/10 at all times. progressing     Long Term Goals: In 8 weeks  1. Patient to score less than 20% impaired on the foto  2. Patient to demo increase in UE strength to 4+/5  3. Patient to have decreased pain to 2/10 at all times.  4. Patient to demo increase GH ROM to 90% pain free  5. Patient to perform daily activities including lifting arm without limitation.        Plan   Plan of care Certification: 3/6/2020 to 4/6/20.     Outpatient Physical Therapy 2 times weekly for 8 weeks to include the following interventions: Electrical Stimulation TENS, Manual Therapy, Moist Heat/ Ice, Neuromuscular Re-ed, Patient Education, Therapeutic Activites and Therapeutic Exercise.       Jerri Granado, PT

## 2020-03-17 NOTE — PROGRESS NOTES
Physical Therapy Assistant Daily Treatment Note     Name: Saira العراقي  Clinic Number: [id number]    Therapy Diagnosis:   Encounter Diagnoses   Name Primary?    Localized osteoarthritis of right shoulder Yes    Arthritis of right acromioclavicular joint      Physician: Clayton Cowart, *    Visit Date: 3/19/2020  Physician Orders: PT Eval and Treat   Medical Diagnosis from Referral: Localized osteoarthritis of right shoulder  Evaluation Date: 3/6/2020  Authorization Period Expiration: 4/3/20  Plan of Care Expiration: 4/6/20  Visit # / Visits authorized: 4/ 6    Time In: 10:05  Time Out: 11:00  Total Billable Time: 55 minutes    Precautions: Standard    Subjective     Pt reports: continued right shoulder stiffness. She said she is sore from cooking, cleaning, and doing laundry yesterday  She was compliant with home exercise program.  Response to previous treatment: minimal soreness  Functional change: improved active range of motion    Pain: 0/10  Location: right shoulder      Objective     Saira received therapeutic exercises to develop strength, endurance and ROM for 45 minutes including:  UBE 3/3  Shoulder abduction slide on mat 2'  Pulleys 3/3 flexion and scaption  Open books 2'  Standing rows with GTB 3x10  Lat Pulldowns with GTB 3x10  Shoulder flexion with YTB 10x  Shoulder scaption with YTB 10x  Serratus punches supine     Saira received the following manual therapy techniques: Joint mobilizations and Soft tissue Mobilization were applied to the: R shoulder for 10 minutes, including:  Posterior and inferior mobilizations grade 3  STM to R UT  Gentle PROM to R shoulder in all planes      Home Exercises Provided and Patient Education Provided     Education provided:   - proper exercise technique, HEP updated     Written Home Exercises Provided: yes.  Exercises were reviewed and Saira was able to demonstrate them prior to the end of the session.  Saira demonstrated good  understanding of the  education provided.      See paper chart under Patient Instructions for exercises provided 3/19/2020.      Assessment     Pt. Continues to tolerate exercises for scapular stability and shoulder ROM without pain. Fatigued noted with increased resistance. Increased shoulder abduction AROM noted. Pt. Was provided additional exercises on HEP for postural stability and shoulder strengthening. Pt. Verbalized understanding of HEP. Upcoming therapy visits were put on hold secondary to covid-19 concerns.  Saira is progressing well towards her goals.   Pt prognosis is Good.     Pt will continue to benefit from skilled outpatient physical therapy to address the deficits listed in the problem list box on initial evaluation, provide pt/family education and to maximize pt's level of independence in the home and community environment.     Pt's spiritual, cultural and educational needs considered and pt agreeable to plan of care and goals.     Anticipated Barriers for therapy: chronicity of symptoms  Goals:  Short Term Goals: In 4 weeks   1.I with HEP progressing  2.Patient to increase GH ROM to 75% pain free progressing  3.Patient to increase MMT strength by 1/2 grade progressing  4.Patient to have pain less than 4/10 at all times. progressing     Long Term Goals: In 8 weeks  1. Patient to score less than 20% impaired on the foto  2. Patient to demo increase in UE strength to 4+/5  3. Patient to have decreased pain to 2/10 at all times.  4. Patient to demo increase GH ROM to 90% pain free  5. Patient to perform daily activities including lifting arm without limitation.        Plan   Plan of care Certification: 3/6/2020 to 4/6/20.     Outpatient Physical Therapy 2 times weekly for 8 weeks to include the following interventions: Electrical Stimulation TENS, Manual Therapy, Moist Heat/ Ice, Neuromuscular Re-ed, Patient Education, Therapeutic Activites and Therapeutic Exercise.       Harriet Calvillo, PTA

## 2020-03-18 ENCOUNTER — TELEPHONE (OUTPATIENT)
Dept: REHABILITATION | Facility: HOSPITAL | Age: 75
End: 2020-03-18

## 2020-03-19 ENCOUNTER — CLINICAL SUPPORT (OUTPATIENT)
Dept: REHABILITATION | Facility: HOSPITAL | Age: 75
End: 2020-03-19
Attending: ORTHOPAEDIC SURGERY
Payer: MEDICARE

## 2020-03-19 DIAGNOSIS — M19.011 ARTHRITIS OF RIGHT ACROMIOCLAVICULAR JOINT: ICD-10-CM

## 2020-03-19 DIAGNOSIS — M19.011 LOCALIZED OSTEOARTHRITIS OF RIGHT SHOULDER: Primary | ICD-10-CM

## 2020-03-19 PROCEDURE — 97140 MANUAL THERAPY 1/> REGIONS: CPT | Mod: CQ

## 2020-03-19 PROCEDURE — 97110 THERAPEUTIC EXERCISES: CPT | Mod: CQ

## 2020-03-25 ENCOUNTER — TELEPHONE (OUTPATIENT)
Dept: REHABILITATION | Facility: HOSPITAL | Age: 75
End: 2020-03-25

## 2020-03-25 ENCOUNTER — TELEPHONE (OUTPATIENT)
Dept: FAMILY MEDICINE | Facility: CLINIC | Age: 75
End: 2020-03-25

## 2020-03-25 NOTE — TELEPHONE ENCOUNTER
----- Message from Kiera Brito sent at 3/25/2020 11:04 AM CDT -----  Contact: self/[phone]  Would like to consult with nurse regarding face mask, please call back at [phone]. Thanks/ar

## 2020-03-30 ENCOUNTER — TELEPHONE (OUTPATIENT)
Dept: REHABILITATION | Facility: HOSPITAL | Age: 75
End: 2020-03-30

## 2020-04-13 ENCOUNTER — TELEPHONE (OUTPATIENT)
Dept: REHABILITATION | Facility: HOSPITAL | Age: 75
End: 2020-04-13

## 2020-05-11 ENCOUNTER — TELEPHONE (OUTPATIENT)
Dept: ORTHOPEDICS | Facility: CLINIC | Age: 75
End: 2020-05-11

## 2020-05-11 NOTE — TELEPHONE ENCOUNTER
Called pt to reschedule her apt with another provider, pt states she would like to wait until the COVID-19 is a little bit more clearly. Pt  States she would like to cancel her apt for now and she will call back to reschedule.

## 2020-06-12 ENCOUNTER — TELEPHONE (OUTPATIENT)
Dept: ORTHOPEDICS | Facility: CLINIC | Age: 75
End: 2020-06-12

## 2020-06-22 ENCOUNTER — TELEPHONE (OUTPATIENT)
Dept: PULMONOLOGY | Facility: CLINIC | Age: 75
End: 2020-06-22

## 2020-06-22 NOTE — TELEPHONE ENCOUNTER
----- Message from Kiera Brito sent at 6/22/2020  1:25 PM CDT -----  Contact: self/[phone]  Would like to consult with nurse regarding a order for a new Nebulizer. Please call back at [phone]. Thanks/ar

## 2020-08-04 ENCOUNTER — TELEPHONE (OUTPATIENT)
Dept: ORTHOPEDICS | Facility: CLINIC | Age: 75
End: 2020-08-04

## 2020-08-26 DIAGNOSIS — M25.511 RIGHT SHOULDER PAIN, UNSPECIFIED CHRONICITY: Primary | ICD-10-CM

## 2020-09-04 ENCOUNTER — OFFICE VISIT (OUTPATIENT)
Dept: ORTHOPEDICS | Facility: CLINIC | Age: 75
End: 2020-09-04
Payer: MEDICARE

## 2020-09-04 ENCOUNTER — HOSPITAL ENCOUNTER (OUTPATIENT)
Dept: RADIOLOGY | Facility: HOSPITAL | Age: 75
Discharge: HOME | End: 2020-09-04
Attending: PHYSICIAN ASSISTANT
Payer: MEDICARE

## 2020-09-04 VITALS
HEIGHT: 60 IN | WEIGHT: 261 LBS | SYSTOLIC BLOOD PRESSURE: 132 MMHG | BODY MASS INDEX: 51.24 KG/M2 | HEART RATE: 65 BPM | DIASTOLIC BLOOD PRESSURE: 67 MMHG

## 2020-09-04 DIAGNOSIS — M25.511 RIGHT SHOULDER PAIN, UNSPECIFIED CHRONICITY: ICD-10-CM

## 2020-09-04 DIAGNOSIS — G89.29 CHRONIC RIGHT SHOULDER PAIN: ICD-10-CM

## 2020-09-04 DIAGNOSIS — M19.011 ARTHRITIS OF RIGHT ACROMIOCLAVICULAR JOINT: ICD-10-CM

## 2020-09-04 DIAGNOSIS — M12.811 ROTATOR CUFF ARTHROPATHY OF RIGHT SHOULDER: Primary | ICD-10-CM

## 2020-09-04 DIAGNOSIS — M25.511 CHRONIC RIGHT SHOULDER PAIN: ICD-10-CM

## 2020-09-04 PROCEDURE — 99999 PR PBB SHADOW E&M-EST. PATIENT-LVL V: CPT | Mod: PBBFAC,,, | Performed by: PHYSICIAN ASSISTANT

## 2020-09-04 PROCEDURE — 3075F SYST BP GE 130 - 139MM HG: CPT | Mod: CPTII,S$GLB,, | Performed by: PHYSICIAN ASSISTANT

## 2020-09-04 PROCEDURE — 1101F PT FALLS ASSESS-DOCD LE1/YR: CPT | Mod: CPTII,S$GLB,, | Performed by: PHYSICIAN ASSISTANT

## 2020-09-04 PROCEDURE — 1125F AMNT PAIN NOTED PAIN PRSNT: CPT | Mod: S$GLB,,, | Performed by: PHYSICIAN ASSISTANT

## 2020-09-04 PROCEDURE — 73030 XR SHOULDER COMPLETE 2 OR MORE VIEWS RIGHT: ICD-10-PCS | Mod: 26,RT,, | Performed by: RADIOLOGY

## 2020-09-04 PROCEDURE — 73030 X-RAY EXAM OF SHOULDER: CPT | Mod: TC,RT

## 2020-09-04 PROCEDURE — 3078F PR MOST RECENT DIASTOLIC BLOOD PRESSURE < 80 MM HG: ICD-10-PCS | Mod: CPTII,S$GLB,, | Performed by: PHYSICIAN ASSISTANT

## 2020-09-04 PROCEDURE — 3075F PR MOST RECENT SYSTOLIC BLOOD PRESS GE 130-139MM HG: ICD-10-PCS | Mod: CPTII,S$GLB,, | Performed by: PHYSICIAN ASSISTANT

## 2020-09-04 PROCEDURE — 1125F PR PAIN SEVERITY QUANTIFIED, PAIN PRESENT: ICD-10-PCS | Mod: S$GLB,,, | Performed by: PHYSICIAN ASSISTANT

## 2020-09-04 PROCEDURE — 99999 PR PBB SHADOW E&M-EST. PATIENT-LVL V: ICD-10-PCS | Mod: PBBFAC,,, | Performed by: PHYSICIAN ASSISTANT

## 2020-09-04 PROCEDURE — 99214 OFFICE O/P EST MOD 30 MIN: CPT | Mod: 25,S$GLB,, | Performed by: PHYSICIAN ASSISTANT

## 2020-09-04 PROCEDURE — 99214 PR OFFICE/OUTPT VISIT, EST, LEVL IV, 30-39 MIN: ICD-10-PCS | Mod: 25,S$GLB,, | Performed by: PHYSICIAN ASSISTANT

## 2020-09-04 PROCEDURE — 20610 LARGE JOINT ASPIRATION/INJECTION: R SUBACROMIAL BURSA: ICD-10-PCS | Mod: RT,S$GLB,, | Performed by: PHYSICIAN ASSISTANT

## 2020-09-04 PROCEDURE — 1101F PR PT FALLS ASSESS DOC 0-1 FALLS W/OUT INJ PAST YR: ICD-10-PCS | Mod: CPTII,S$GLB,, | Performed by: PHYSICIAN ASSISTANT

## 2020-09-04 PROCEDURE — 20610 DRAIN/INJ JOINT/BURSA W/O US: CPT | Mod: RT,S$GLB,, | Performed by: PHYSICIAN ASSISTANT

## 2020-09-04 PROCEDURE — 73030 X-RAY EXAM OF SHOULDER: CPT | Mod: 26,RT,, | Performed by: RADIOLOGY

## 2020-09-04 PROCEDURE — 1159F MED LIST DOCD IN RCRD: CPT | Mod: S$GLB,,, | Performed by: PHYSICIAN ASSISTANT

## 2020-09-04 PROCEDURE — 1159F PR MEDICATION LIST DOCUMENTED IN MEDICAL RECORD: ICD-10-PCS | Mod: S$GLB,,, | Performed by: PHYSICIAN ASSISTANT

## 2020-09-04 PROCEDURE — 3078F DIAST BP <80 MM HG: CPT | Mod: CPTII,S$GLB,, | Performed by: PHYSICIAN ASSISTANT

## 2020-09-04 RX ORDER — METHYLPREDNISOLONE ACETATE 80 MG/ML
80 INJECTION, SUSPENSION INTRA-ARTICULAR; INTRALESIONAL; INTRAMUSCULAR; SOFT TISSUE
Status: DISCONTINUED | OUTPATIENT
Start: 2020-09-04 | End: 2020-09-04 | Stop reason: HOSPADM

## 2020-09-04 RX ADMIN — METHYLPREDNISOLONE ACETATE 80 MG: 80 INJECTION, SUSPENSION INTRA-ARTICULAR; INTRALESIONAL; INTRAMUSCULAR; SOFT TISSUE at 08:09

## 2020-09-04 NOTE — PROCEDURES
Large Joint Aspiration/Injection: R subacromial bursa    Date/Time: 9/4/2020 8:40 AM  Performed by: Paulette Espinal PA-C  Authorized by: Paulette Espinal PA-C     Consent Done?:  Yes (Verbal)  Indications:  Pain  Site marked: the procedure site was marked    Timeout: prior to procedure the correct patient, procedure, and site was verified    Prep: patient was prepped and draped in usual sterile fashion      Local anesthesia used?: Yes    Local anesthetic:  Lidocaine 1% without epinephrine  Anesthetic total (ml):  2      Details:  Needle Size:  22 G  Approach:  Posterior  Location:  Shoulder  Site:  R subacromial bursa  Medications:  80 mg methylPREDNISolone acetate 80 mg/mL  Patient tolerance:  Patient tolerated the procedure well with no immediate complications     After verbal consent was obtained for right shoulder injection, patient ID, site, and side were verified.  The  right  Shoulder was sterilly prepped in the standard fashion.  A 22-gauge needle was introduced into right subacromial space from the posterior portal approach without complication. The right shoulder was then injected with 20 mg lidocaine plain and 80 mg depomedrol.  A sterile bandaid was applied.  The patient was informed to apply an ice pack approximately 10min once arriving home and not to do anything strenuous for 24hours.  Elevation of glucose in diabetic patients was discussed.  She was instructed to call if there were any problems. The patient was discharged in stable condition.

## 2020-09-04 NOTE — PROGRESS NOTES
Patient ID: Saira العراقي is a 75 y.o. female.    Chief Complaint: Pain of the Right Shoulder      HPI: Saira العراقي  is a 75 y.o. female who c/o Pain of the Right Shoulder   for duration of years, but worse over the last 3 months or so.  She is a patient of the department.  She has previously seen Dr. Clayton Cowart.  Most recently, she saw him before the pandemic started back in February.  I have reviewed her medical record in regards to her prior orthopedic appointments.  At his last office visit, he discussed with her the possibility of a reverse total shoulder replacement.  She was not interested in doing any sort of surgical intervention at that time in opted for continued conservative management in the form of a corticosteroid injection.  She tells me the injection lasted about 2 and half to 3 months.  She has history of a right shoulder rotator cuff repair by Dr. Teixeira years ago.    Pain level is 9/10.  Quality is aching and intermittent.  Alleviating factors include prior corticosteroid injection as well as therapy.  Aggravating factors include trying to raise her arm particularly above shoulder level.  She also has worsening pain with internal rotation and external rotation.  She complains of associated weakness but denies radicular symptoms.  She was doing therapy prior to the pandemic but stopped once pandemic really to hold in the [place] area.    Past Medical History:   Diagnosis Date    Arthritis     Asthma     COPD (chronic obstructive pulmonary disease)     Diabetes mellitus     Encounter for blood transfusion     Hypertension     Lung disease     Sleep apnea      Past Surgical History:   Procedure Laterality Date    APPENDECTOMY      CARDIAC SURGERY      valve replacement 12/2014    CATARACT EXTRACTION W/ INTRAOCULAR LENS  IMPLANT, BILATERAL      CHOLECYSTECTOMY      CYST REMOVAL      HERNIA REPAIR      left, 3/2015    HYSTERECTOMY      KNEE ARTHROPLASTY      shoulder        Family History   Problem Relation Age of Onset    Cancer Mother     Diabetes Mother     Hypertension Mother     Cancer Father     Hypertension Father     Heart failure Maternal Grandfather     Heart failure Son      Social History     Socioeconomic History    Marital status:      Spouse name: Not on file    Number of children: Not on file    Years of education: Not on file    Highest education level: Not on file   Occupational History    Not on file   Social Needs    Financial resource strain: Not on file    Food insecurity     Worry: Not on file     Inability: Not on file    Transportation needs     Medical: Not on file     Non-medical: Not on file   Tobacco Use    Smoking status: Former Smoker     Packs/day: 1.00     Years: 50.00     Pack years: 50.00     Quit date: 2013     Years since quittin.7    Smokeless tobacco: Never Used   Substance and Sexual Activity    Alcohol use: No    Drug use: No    Sexual activity: Not on file   Lifestyle    Physical activity     Days per week: Not on file     Minutes per session: Not on file    Stress: Not on file   Relationships    Social connections     Talks on phone: Not on file     Gets together: Not on file     Attends [employer] service: Not on file     Active member of club or organization: Not on file     Attends meetings of clubs or organizations: Not on file     Relationship status: Not on file   Other Topics Concern    Not on file   Social History Narrative    Not on file     Medication List with Changes/Refills   Current Medications    ALBUTEROL (ACCUNEB) 0.63 MG/3 ML NEBU    INHALE 1 VIAL VIA NEBULIZER EVERY 6 HOURS AS NEEDED    ALBUTEROL (VENTOLIN HFA) 90 MCG/ACTUATION INHALER    Inhale 2 puffs into the lungs every 6 (six) hours as needed for Wheezing. Rescue    ALCOHOL PREP PADS PADM        ALENDRONATE (FOSAMAX) 70 MG TABLET    Take 70 mg by mouth once a week.    AMLODIPINE (NORVASC) 10 MG TABLET    Take 10 mg by mouth  once daily.     AMMONIUM LACTATE 12 % CREA    Apply 1 Applicatorful topically.    ASPIRIN (ECOTRIN) 81 MG EC TABLET    Take 81 mg by mouth once daily.    ATORVASTATIN (LIPITOR) 40 MG TABLET    Take 40 mg by mouth once daily.     BENICAR 40 MG TABLET    Take 40 mg by mouth 2 (two) times a day.     CALCIUM CITRATE-VITAMIN D3 315-200 MG (CITRACAL+D) 315-200 MG-UNIT PER TABLET    Take 1 tablet by mouth once daily.     CLEVER CHOICE BLOOD GLUC SYS [facility]        DICLOFENAC SODIUM 1 % GEL    Apply 2 g topically 4 (four) times daily.    TranquilMed BLOOD GLUCOSE SYSTEM STRP        FLUAD 7979-6181, 65 YR UP,,PF, 45 MCG (15 MCG X 3)/0.5 ML SYRG        FLUTICASONE FUROATE-VILANTEROL (BREO ELLIPTA) 200-25 MCG/DOSE DSDV DISKUS INHALER    Inhale 1 puff into the lungs once daily.    FLUTICASONE PROPIONATE (FLONASE) 50 MCG/ACTUATION NASAL SPRAY    1 spray daily as needed.     FUROSEMIDE (LASIX) 40 MG TABLET    TAKE 1 TABLET BY MOUTH TWICE A DAY    GABAPENTIN (NEURONTIN) 300 MG CAPSULE    Take 300 mg by mouth 2 (two) times daily.     GLUCOSAMINE-D3-BOSWELLIA SERR (OSTEO BI-FLEX, 5-LOXIN,) 1,500-400-100 MG-UNIT-MG TAB    Take 1 tablet by mouth daily as needed.    HYDROCODONE-ACETAMINOPHEN (NORCO)  MG PER TABLET    Take 1 tablet by mouth daily as needed.    LANCING DEVICE WITH LANCETS MISC        LIDOCAINE-PRILOCAINE (EMLA) CREAM    as needed.     LITE TOUCH LANCETS 30 GAUGE [facility]        METFORMIN (GLUCOPHAGE) 500 MG TABLET    Take 500 mg by mouth 2 (two) times daily with meals.    METOLAZONE (ZAROXOLYN) 2.5 MG TABLET    Take 1 tablet by mouth once daily.    METOPROLOL TARTRATE (LOPRESSOR) 50 MG TABLET    Take 1 tablet by mouth 2 (two) times daily.    MONTELUKAST (SINGULAIR) 10 MG TABLET    Take 1 tablet (10 mg total) by mouth every evening.    OMEGA-3 FATTY ACIDS 500 MG CAP    Take 1 capsule by mouth.    POTASSIUM CHLORIDE SA (K-DUR,KLOR-CON) 20 MEQ TABLET    Take 1 tablet by mouth once daily.    TIOTROPIUM BROMIDE (SPIRIVA RESPIMAT)  2.5 MCG/ACTUATION MIST    Inhale 2 puffs into the lungs once daily. Controller    VITAMIN E 1000 UNIT CAPSULE    Take 1,000 Units by mouth once daily.     Review of patient's allergies indicates:   Allergen Reactions    Ace inhibitors Anaphylaxis    Ultram [tramadol] Anaphylaxis       Objective:        General    Vitals reviewed.  Constitutional: She is oriented to person, place, and time. She appears well-developed and well-nourished. No distress.   HENT:   Head: Atraumatic.   Nose: Nose normal.   Eyes: EOM are normal. Right eye exhibits no discharge. Left eye exhibits no discharge.   Neck: Neck supple.   Cardiovascular: Normal rate and intact distal pulses.    Pulmonary/Chest: Effort normal. No respiratory distress.   Neurological: She is alert and oriented to person, place, and time. Coordination normal.   Psychiatric: She has a normal mood and affect. Her behavior is normal. Judgment and thought content normal.         Right Shoulder Exam     Inspection/Observation   Swelling: absent  Bruising: absent  Scars: present (healed; no SOI)  Deformity: absent  Scapular Winging: absent  Scapular Dyskinesia: negative  Atrophy: absent    Tenderness   The patient is tender to palpation of the biceps tendon, acromioclavicular joint and greater tuberosity.    Range of Motion   Active abduction: 70   Passive abduction: 100   Extension: 0   Forward Flexion: 80   Forward Elevation: 90Adduction: 40   External Rotation 0 degrees: 30   Internal rotation 0 degrees: Sacrum     Tests & Signs   Drop arm: negative  Calhoun test: positive  Impingement: positive  Active Compression Test ([place]'s Sign): negative  Speed's Test: positive    Other   Sensation: normal    Left Shoulder Exam     Tenderness   The patient is experiencing no tenderness.     Range of Motion   Active abduction: 90   Passive abduction: 100   Extension: 0   Forward Flexion: 160   Forward Elevation: 160Adduction: 40   External Rotation 0 degrees: 40   Internal  rotation 0 degrees: T8   Internal rotation 90 degrees: 30     Tests & Signs   Drop arm: negative  Calhoun test: negative  Impingement: negative  Active Compression test ([place]'s Sign): negative  Speed's Test: negative    Other   Sensation: normal       Muscle Strength   Right Upper Extremity   Shoulder Abduction: 4/5   Shoulder Internal Rotation: 4/5   Shoulder External Rotation: 4/5   Supraspinatus: 4/5   Subscapularis: 4/5   Biceps: 4/5   Left Upper Extremity  Shoulder Abduction: 5/5   Shoulder Internal Rotation: 5/5   Shoulder External Rotation: 5/5   Supraspinatus: 5/5   Subscapularis: 5/5   Biceps: 5/5     Reflexes     Left Side  Biceps:  2+  Triceps:  2+    Right Side   Biceps:  2+  Triceps:  2+    Vascular Exam     Right Pulses      Radial:                    2+      Left Pulses      Radial:                    2+      Capillary Refill  Right Hand: normal capillary refill  Left Hand: normal capillary refill      Xray Images and report were reviewed today.  I agree with the radiologist's interpretation.    X-ray Shoulder 2 or More Views Right  Narrative: EXAMINATION:  XR SHOULDER COMPLETE 2 OR MORE VIEWS RIGHT    CLINICAL HISTORY:  Pain in right shoulder    TECHNIQUE:  Two or three views of the right shoulder were performed.    COMPARISON:  September 30, 2019    FINDINGS:  Degenerative changes again noted at the AC and glenohumeral joints.  There is narrowing subacromial space with slight superior subluxation humeral head at the glenohumeral joint.  Inferior spurring at the glenohumeral joint.  Calcifications along greater tuberosity and in the subacromial space laterally.  Remaining osseous structures demineralized without grossly evident fracture.  Degenerative change partially visualized spine.  Postsurgical changes sternotomy.  Impression: As above.  Or or    Electronically signed by: Wally Dacosta MD  Date:    09/04/2020  Time:    08:44    MRI Arthrogram Shoulder With Contrast Right  Order:  [id number]  Status:  Final result   Visible to patient:  No (not released)   Next appt:  10/26/2020 at 01:15 PM in Radiology (ON XR1-DR)   Dx:  Secondary osteoarthritis of right donnell...  Details    Reading Physician Reading Date Result Priority   Robi Conner MD  [phone] 12/13/2019 Routine      Narrative & Impression     EXAMINATION:  MRI ARTHROGRAM SHOULDER WITH CONTRAST RIGHT     CLINICAL HISTORY:  Pain in right shoulder.     TECHNIQUE:  MR arthrogram right shoulder performed multiplanar T1 and T2 fat-suppressed sequences.     COMPARISON:  Right shoulder x-ray 05/05/2017.     FINDINGS:  The glenohumeral joint is well distended with contrast.     There is extensive rotator cuff tear with complete, full-thickness tears of the supraspinatus and infraspinatus tendons, with up to 2 cm tendon retraction.  There is no muscle belly atrophy.  As seen on the remote x-ray, there are bursal sided calcium deposits overlying the greater tuberosity at the level the torn supraspinatus.     There is also subscapularis tendinosis with high-grade partial thickness tearing, possible focal full-thickness tear, involving the upper subscapularis tendon.     Long head biceps tendon is intact with moderate tendinosis.     There is a SLAP tear extending into the posterosuperior quadrant with degenerative signal.  Labrum is otherwise unremarkable.     There is advanced glenohumeral osteoarthritis with prominent inferior humeral head spurring.  There is advanced chondromalacia with areas of full-thickness chondral loss humeral head and glenoid with associated subarticular cyst formation.     There is superior migration of the humeral head relative to the rotator cuff tear.  Normal IGHL.     Type 2 acromion status post subacromial decompression/distal acromioplasty.  Contrast fills the subacromial subdeltoid bursal space via the rotator cuff tear.     The bone marrow signal intensity is otherwise unremarkable.  The signal intensity of  the musculature is groups is normal.     Impression:     1. Extensive rotator cuff tearing with complete, full-thickness tears supraspinatus and infraspinatus, up to 2 cm tendon retraction, without muscle belly atrophy.  As seen on the remote x-ray, there are bursal sided calcium deposits overlying the greater tuberosity at the level the torn supraspinatus.  2. Subscapularis tendinosis with high-grade partial-thickness tearing, possible focal full-thickness tear, upper tendon.  3. Moderate long head biceps tendinosis without disruption.  4. SLAP tear extends into the posterosuperior quadrant.  5. Advanced glenohumeral osteoarthritis/chondromalacia sequela, detailed above.  Superior migration humeral head.  6. Status post subacromial decompression/distal acromioplasty.        Electronically signed by: Robi Conner MD  Date:                                            12/13/2019  Time:                                           16:06               Assessment:       Encounter Diagnoses   Name Primary?    Rotator cuff arthropathy of right shoulder Yes    Chronic right shoulder pain     Arthritis of right acromioclavicular joint           Plan:       Saira was seen today for pain.    Diagnoses and all orders for this visit:    Rotator cuff arthropathy of right shoulder  -     Large Joint Aspiration/Injection: R subacromial bursa    Chronic right shoulder pain  -     Large Joint Aspiration/Injection: R subacromial bursa    Arthritis of right acromioclavicular joint  -     Large Joint Aspiration/Injection: R subacromial bursa    Other orders  -     Cancel: X-ray Shoulder 2 or More Views Right; Future        Saira العراقي is an established pt who comes in today for she has rotator cuff arthropathy.  This is a new problem to my clinic.  She has already been counseled by a surgeon regarding appropriate operative interventions including a reverse total shoulder.  She saw Dr. Cowart in February and declined that and will  of more conservative treatment.  She is not interested in undergoing a reverse total shoulder replacement during the COVID pandemic.  She is interested in continuing conservative management.  I have given her a home exercise program.  We have also discussed risks and benefits of moving forward with another steroid injection.  She wishes to proceed.  She is interested in moving forward with surgery once the pandemic subsides.  I will be happy to see her back in the office in 3 to 4 months for continued injection if needed.  Once she is ready to consider surgical management, I will definitely get her referred over to Dr. Julian vieira.  She verbalizes understanding and agrees.    F/u 3-4 months prn    The patient understands, chooses and consents to this plan and accepts all   the risks which include but are not limited to the risks mentioned above.     Disclaimer: This note was prepared using a voice recognition system and is likely to have sound alike errors within the text.

## 2020-10-21 NOTE — TELEPHONE ENCOUNTER
----- Message from Gladys Castillo sent at 5/5/2017  8:23 AM CDT -----  Contact: patient  Calling [facility] needing an order to get w/c and PT. States it has been approved by insurance just need orders. Please call patient @ [phone]. Send fax to [phone]. Thanks, lazaro   
Patient notified that referral and last note will be faxed to [facility] again  
Statement Selected

## 2020-10-22 ENCOUNTER — DOCUMENTATION ONLY (OUTPATIENT)
Dept: REHABILITATION | Facility: HOSPITAL | Age: 75
End: 2020-10-22

## 2020-10-22 NOTE — PROGRESS NOTES
Outpatient Therapy Discharge Summary     Name: Saira العراقي  Clinic Number: [id number]    Therapy Diagnosis: Localsed OA of R shoulder  Physician: Clayton Cowart    Physician Orders: PT Eval and Treat  Medical Diagnosis: Localized OA of R shoulder  Evaluation Date: 3/6/30      Date of Last visit: 3/19/20  Total Visits Received: 3  Cancelled Visits: 2  No Show Visits: 0    Assessment    Goals:     Short Term Goals: In 4 weeks   1.I with HEP progressing  2.Patient to increase GH ROM to 75% pain free progressing  3.Patient to increase MMT strength by 1/2 grade progressing  4.Patient to have pain less than 4/10 at all times. progressing     Long Term Goals: In 8 weeks  1. Patient to score less than 20% impaired on the foto not met  2. Patient to demo increase in UE strength to 4+/5 not met  3. Patient to have decreased pain to 2/10 at all times. Not met  4. Patient to demo increase GH ROM to 90% pain free not met  5. Patient to perform daily activities including lifting arm without limitation. Not met    Discharge reason: Patient has not attended therapy since 3/19/20    Plan   This patient is discharged from Physical Therapy

## 2020-10-26 ENCOUNTER — HOSPITAL ENCOUNTER (OUTPATIENT)
Dept: RADIOLOGY | Facility: HOSPITAL | Age: 75
Discharge: HOME | End: 2020-10-26
Attending: INTERNAL MEDICINE
Payer: MEDICARE

## 2020-10-26 ENCOUNTER — OFFICE VISIT (OUTPATIENT)
Dept: PULMONOLOGY | Facility: CLINIC | Age: 75
End: 2020-10-26
Payer: MEDICARE

## 2020-10-26 VITALS
WEIGHT: 263.88 LBS | HEIGHT: 60 IN | HEART RATE: 81 BPM | OXYGEN SATURATION: 94 % | DIASTOLIC BLOOD PRESSURE: 72 MMHG | RESPIRATION RATE: 20 BRPM | BODY MASS INDEX: 51.8 KG/M2 | SYSTOLIC BLOOD PRESSURE: 120 MMHG

## 2020-10-26 DIAGNOSIS — I27.22 PULMONARY HYPERTENSION DUE TO LEFT HEART VALVULAR DISEASE: Chronic | ICD-10-CM

## 2020-10-26 DIAGNOSIS — I38 PULMONARY HYPERTENSION DUE TO LEFT HEART VALVULAR DISEASE: Chronic | ICD-10-CM

## 2020-10-26 DIAGNOSIS — I35.0 MODERATE AORTIC VALVE STENOSIS: Chronic | ICD-10-CM

## 2020-10-26 DIAGNOSIS — G47.33 OSA ON CPAP: Chronic | ICD-10-CM

## 2020-10-26 DIAGNOSIS — E66.01 MORBID OBESITY WITH BMI OF 50.0-59.9, ADULT: Chronic | ICD-10-CM

## 2020-10-26 DIAGNOSIS — J45.40 MODERATE PERSISTENT ASTHMA WITHOUT COMPLICATION: Chronic | ICD-10-CM

## 2020-10-26 DIAGNOSIS — J45.40 MODERATE PERSISTENT ASTHMA WITHOUT COMPLICATION: ICD-10-CM

## 2020-10-26 DIAGNOSIS — J98.4 CRLD (CHRONIC RESTRICTIVE LUNG DISEASE): Chronic | ICD-10-CM

## 2020-10-26 DIAGNOSIS — J45.40 MODERATE PERSISTENT ASTHMA WITHOUT COMPLICATION: Primary | Chronic | ICD-10-CM

## 2020-10-26 PROCEDURE — 3078F DIAST BP <80 MM HG: CPT | Mod: CPTII,S$GLB,, | Performed by: INTERNAL MEDICINE

## 2020-10-26 PROCEDURE — 3074F SYST BP LT 130 MM HG: CPT | Mod: CPTII,S$GLB,, | Performed by: INTERNAL MEDICINE

## 2020-10-26 PROCEDURE — 71046 XR CHEST PA AND LATERAL: ICD-10-PCS | Mod: 26,,, | Performed by: RADIOLOGY

## 2020-10-26 PROCEDURE — 1159F PR MEDICATION LIST DOCUMENTED IN MEDICAL RECORD: ICD-10-PCS | Mod: S$GLB,,, | Performed by: INTERNAL MEDICINE

## 2020-10-26 PROCEDURE — 99214 PR OFFICE/OUTPT VISIT, EST, LEVL IV, 30-39 MIN: ICD-10-PCS | Mod: 25,S$GLB,, | Performed by: INTERNAL MEDICINE

## 2020-10-26 PROCEDURE — 1101F PR PT FALLS ASSESS DOC 0-1 FALLS W/OUT INJ PAST YR: ICD-10-PCS | Mod: CPTII,S$GLB,, | Performed by: INTERNAL MEDICINE

## 2020-10-26 PROCEDURE — 71046 X-RAY EXAM CHEST 2 VIEWS: CPT | Mod: 26,,, | Performed by: RADIOLOGY

## 2020-10-26 PROCEDURE — 99214 OFFICE O/P EST MOD 30 MIN: CPT | Mod: 25,S$GLB,, | Performed by: INTERNAL MEDICINE

## 2020-10-26 PROCEDURE — G0008 ADMIN INFLUENZA VIRUS VAC: HCPCS | Mod: S$GLB,,, | Performed by: INTERNAL MEDICINE

## 2020-10-26 PROCEDURE — 99999 PR PBB SHADOW E&M-EST. PATIENT-LVL V: CPT | Mod: PBBFAC,,, | Performed by: INTERNAL MEDICINE

## 2020-10-26 PROCEDURE — 1101F PT FALLS ASSESS-DOCD LE1/YR: CPT | Mod: CPTII,S$GLB,, | Performed by: INTERNAL MEDICINE

## 2020-10-26 PROCEDURE — 3078F PR MOST RECENT DIASTOLIC BLOOD PRESSURE < 80 MM HG: ICD-10-PCS | Mod: CPTII,S$GLB,, | Performed by: INTERNAL MEDICINE

## 2020-10-26 PROCEDURE — 1159F MED LIST DOCD IN RCRD: CPT | Mod: S$GLB,,, | Performed by: INTERNAL MEDICINE

## 2020-10-26 PROCEDURE — 71046 X-RAY EXAM CHEST 2 VIEWS: CPT | Mod: TC

## 2020-10-26 PROCEDURE — 3074F PR MOST RECENT SYSTOLIC BLOOD PRESSURE < 130 MM HG: ICD-10-PCS | Mod: CPTII,S$GLB,, | Performed by: INTERNAL MEDICINE

## 2020-10-26 PROCEDURE — 99999 PR PBB SHADOW E&M-EST. PATIENT-LVL V: ICD-10-PCS | Mod: PBBFAC,,, | Performed by: INTERNAL MEDICINE

## 2020-10-26 PROCEDURE — 90694 FLU VACCINE - QUADRIVALENT - ADJUVANTED: ICD-10-PCS | Mod: S$GLB,,, | Performed by: INTERNAL MEDICINE

## 2020-10-26 PROCEDURE — 90694 VACC AIIV4 NO PRSRV 0.5ML IM: CPT | Mod: S$GLB,,, | Performed by: INTERNAL MEDICINE

## 2020-10-26 PROCEDURE — G0008 FLU VACCINE - QUADRIVALENT - ADJUVANTED: ICD-10-PCS | Mod: S$GLB,,, | Performed by: INTERNAL MEDICINE

## 2020-10-26 NOTE — ASSESSMENT & PLAN NOTE
Continue CPAP of 13. (ALONZO Davis TASHA)     Discussed therapeutic goals for positive airway pressure therapy(CPAP or BiPAP): Ideal is usage 100% of nights for 6 - 8 hours per night. Minimum usage is 70% of night for at least 4 hours per night used Pateint expressed understanding. All Questions answered.    Complications of untreated sleep apnea discussed with pateint in detail including but not limited to  high blood pressure, heart attack, stroke, obesity,  diabetes and worsening heart failure. Patient voiced understanding.    CPAP supplies.    ONSAT on CPAP.

## 2020-10-26 NOTE — ASSESSMENT & PLAN NOTE
Plan of management per CARDIOLOGY (Dr. Daigle)  Preload and afterload reduction [ LASIX, NORVASC]  Dietary salt restriction.  Alcohol and tobacco avoidance.

## 2020-10-26 NOTE — PROGRESS NOTES
Subjective:      Established patient    Patient ID: Saira العراقي is a 75 y.o. female.  Patient Active Problem List   Diagnosis    Right knee pain    Moderate persistent asthma without complication    JACQUELINE on CPAP    Arthritis of right shoulder region    Right shoulder tendonitis    Carpal tunnel syndrome    Right carpal tunnel syndrome    Unspecified disorder of synovium, tendon, and bursa    Acromioclavicular (joint) (ligament) sprain    Impingement syndrome of right shoulder    Rotator cuff tendinitis    Morbid obesity with BMI of 50.0-59.9, adult    Arthritis of left shoulder region    Impingement syndrome of left shoulder    Impaired physical mobility    Synovitis of left knee    Numbness and tingling    Left knee pain    Left shoulder pain    Carpal tunnel syndrome on both sides    Preop testing    CRLD (chronic restrictive lung disease)    Moderate aortic valve stenosis S/P AVR12/31/2014    Pulmonary hypertension due to left heart valvular disease    Localized osteoarthritis of right shoulder    Arthritis of right acromioclavicular joint       Problem list has been reviewed.    Chief Complaint: Sleep Apnea and CRLD    CXR reviewed with patient who voiced understanding.         Patients reports NYHA II dyspnea    The patient does not have currently have symptoms / an exacerbation.      Her current respiratory therapy regimen is SINGULAIR, BREO, VENTOLIN and PROVENTIL which provides  relief. She is adherent with her regimen.       REGIMEN Morning Evening   NEBULIZER PROVENTIL NEBS PROVENTIL NEBS   Controller  BREO -   RESCUE - VENTOLIN PRN PRN       ASTHMA IS  SOMEWHAT CONTROLLED.    Asthma Control Test  In the past 4  weeks, how much of the time did your asthma keep you from getting as much done at work, school or at home?: A little of the time  During the past 4 weeks, how often have you had shortness of breath?: More than once a day  During the past 4 weeks, how often did your  asthma symptoms (wheezing, couging, shortness of breath, chest tightness or pain) wake you up at night or earlier than usual in the morning?: Once or twice  During the past 4 weeks, how often have you used your rescue inhaler or nebulizer medication (such as albuterol)?: 1 or 2 times per day  How would you rate your asthma control during the past 4 weeks?: Well controlled  If your score is 19 or less, your asthma may not be under control: 15      No recent change in breathing.     Current Disease Severity  frequent daytime asthma symptoms.   weekly nighttime asthma symptoms.   Frequency B-agonist use: daily.   Number of urgent/emergent visit in last year: 2 times.  Current limitations in activity from asthma: none.   Number of days of school or work missed in the last month: not applicable.      Asthma Classification (General Symptom Frequency):  Mild Intermittent (< 2 x wk)  Mild Persistent (> 2 x wk, < daily)  Moderate Persistent (daily; almost daily inhaler)  Severe Persistent (continual; limited activities)       Immunization status reviewed and up to date.     She is on CPAP of 13 cms.   She did not bring her data card for compliance download.    Patient denies snoring, headaches, excessive daytime sleepiness.   She definitely thinks PAP is beneficial to her health and she wants to continue with PAP therapy.     Compliance Summary  7/25/2020 - 10/22/2020 (90 days)  Days with Device Usage 73 days  Days without Device Usage 17 days  Percent Days with Device Usage 81.1%  Cumulative Usage 14 days 18 hrs. 32 mins. 11 secs.  Maximum Usage (1 Day) 9 hrs. 27 mins. 13 secs.  Average Usage (All Days) 3 hrs. 56 mins. 21 secs.  Average Usage (Days Used) 4 hrs. 51 mins. 23 secs.  Minimum Usage (1 Day) 1 mins. 10 secs.  Percent of Days with Usage >= 4 Hours 51.1%  Percent of Days with Usage < 4 Hours 48.9%  Total Blower Time 15 days 2 hrs. 10 mins. 17 secs.  CPAP Summary  Average Time in Large Leak Per Day 15 mins. 16  secs.  Average AHI 2.7  CPAP 13.0 cmH2O      [place] Sleepiness Scale   EPWORTH SLEEPINESS SCALE 10/26/2020 2/19/2020 8/12/2019 1/18/2019 1/2/2019 2/2/2018 11/17/2015   Sitting and reading 1 3 3 3 0 1 3   Watching TV 3 3 3 3 3 3 3   Sitting, inactive in a public place (e.g. a theatre or a meeting) 0 0 0 0 0 1 0   As a passenger in a car for an hour without a break 0 0 0 0 1 0 2   Lying down to rest in the afternoon when circumstances permit 3 3 3 3 3 3 3   Sitting and talking to someone 0 0 1 0 0 0 0   Sitting quietly after a lunch without alcohol 1 1 2 2 3 2 3   In a car, while stopped for a few minutes in traffic 0 0 0 0 0 0 0   Total score 8 10 12 11 10 10 14         S/P AVR 12/31/2014: (TRIFECTA TISSUE HEART VALVE)     Transthoracic echo (TTE) complete: 02/13/20:    · Concentric left ventricular hypertrophy.  · Normal left ventricular systolic function. The estimated ejection fraction is 55%.  · Normal LV diastolic function.  · Normal right ventricular systolic function.  · Mild to moderate tricuspid regurgitation.  · Normal central venous pressure (3 mmHg).  · The estimated PA systolic pressure is 84 mmHg.  · Pulmonary hypertension present.  · Moderate aortic valve stenosis.  · Aortic valve area is 1.41 cm2; peak velocity is 3.81 m/s; mean gradient is 29 mmHg.    CARDIOLOGIST is Dr. VALENCIA.          A full  review of systems, past , family  and social histories was performed except as mentioned in the note above, these are non contributory to the main issues discussed today.       Previous Report Reviewed: lab reports, office notes and radiology reports     The following portions of the patient's history were reviewed and updated as appropriate: She  has a past medical history of Arthritis, Asthma, COPD (chronic obstructive pulmonary disease), Diabetes mellitus, Encounter for blood transfusion, Hypertension, Lung disease, and Sleep apnea.  She  has a past surgical history that includes Knee Arthroplasty;  shoulder; Cyst Removal; Appendectomy; Hysterectomy; Cholecystectomy; Cardiac surgery; Hernia repair; and Cataract extraction w/ intraocular lens  implant, bilateral.  Her family history includes Cancer in her father and mother; Diabetes in her mother; Heart failure in her maternal grandfather and son; Hypertension in her father and mother.  She  reports that she quit smoking about 6 years ago. She has a 50.00 pack-year smoking history. She has never used smokeless tobacco. She reports that she does not drink alcohol or use drugs.  She has a current medication list which includes the following prescription(s): albuterol, albuterol, alcohol prep pads, alendronate, amlodipine, ammonium lactate, aspirin, atorvastatin, benicar, calcium citrate-vitamin d3 315-200 mg, clever choice blood gluc sys, Spockly blood glucose system, fluad 8440-4956 (65 yr up)(pf), fluticasone furoate-vilanterol, fluticasone propionate, furosemide, gabapentin, glucosamine-d3-boswellia serr, hydrocodone-acetaminophen, lancing device with lancets, lidocaine-prilocaine, lite touch lancets, metolazone, metoprolol tartrate, montelukast, omega-3 fatty acids, potassium chloride sa, vitamin e, diclofenac sodium, and metformin, and the following Facility-Administered Medications: sodium chloride 0.9%.  She is allergic to ace inhibitors and ultram [tramadol]..    Review of Systems   Constitutional: Negative for fever, chills and fatigue.   HENT: Positive for rhinorrhea, sinus pressure and congestion. Negative for postnasal drip.    Eyes: Positive for itching.   Respiratory: Positive for shortness of breath and dyspnea on extertion. Negative for cough, hemoptysis, sputum production and wheezing.    Cardiovascular: Positive for leg swelling. Negative for chest pain.   Genitourinary: Negative for difficulty urinating and hematuria.   Musculoskeletal: Positive for arthralgias and back pain.   Skin: Negative for rash.   Gastrointestinal: Negative for nausea,  vomiting and abdominal pain.   Neurological: Negative for dizziness, syncope, light-headedness and headaches.   Hematological: Bleeds easily.   Psychiatric/Behavioral: The patient is not nervous/anxious.         Objective:     /72   Pulse 81   Resp 20   Ht 5' (1.524 m)   Wt 119.7 kg (263 lb 14.3 oz)   SpO2 (!) 94%   BMI 51.54 kg/m²   Body mass index is 51.54 kg/m².     Physical Exam  Constitutional:       Appearance: She is well-developed.      Comments: Obese   HENT:      Head: Normocephalic and atraumatic.   Neck:      Musculoskeletal: Normal range of motion and neck supple.   Cardiovascular:      Rate and Rhythm: Normal rate and regular rhythm.      Heart sounds: Murmur present. Crescendo  systolic murmur present with a grade of 3/6. No gallop.    Pulmonary:      Effort: Pulmonary effort is normal.      Breath sounds: Normal breath sounds.   Abdominal:      General: Bowel sounds are normal.      Palpations: Abdomen is soft.   Musculoskeletal: Normal range of motion.   Skin:     General: Skin is warm and dry.   Neurological:      Mental Status: She is alert and oriented to person, place, and time.         Personal Diagnostic Review    CXR: 10/26/20: Borderline cardiomegaly with mild prominence central pulmonary vasculature.  Bibasilar and midlung field linear areas of scarring and or subsegmental atelectasis, greater on the left.  Patchy coalescent scattered infiltrates throughout the bases with equivocal coalescence posteriorly on the lateral view.  Mild indistinctness of the right heart border suggest middle and lower lobe involvement.  Probable left epicardial fat pad.  CP angles clear on the left with minimal blunting on the right.  Upper lung fields free of consolidation.  Trachea midline.  Osteopenia and spondylosis.  Status post CABG.  Prominent degenerative change bilateral shoulders right greater than left.      Pulmonary function tests: 02/19/20: FEV1:1.09  (70.1 % predicted), FVC:  1.32  (66.4 % predicted), FEV1/FVC:  82, TLC: 3.29 (77.5 % predicted), RV/TLVC: 57 (129.2 % predicted), DLCO: 10.83 (59.8 % predicted)  Normal airflow. Mild restriction. Diffusion capacity is moderately reduced but corrects for alveolar volume.    Six minute walk test: 02/19/20:   Six minute walk distance is 220.98 / 291.31 meters (75.86 % predicted) with very heavy dyspnea.   Peak VO2 during walking was 10.61  Ml/min/kg [ 3.03 METS].   During exercise, there was significant desaturation while breathing room air.  Lowest oxygen saturation during walking was 92 %.   Both blood pressure and heart rate increased significantly with walking.  Normotension was present prior to exercise.  This may represent an abnormal cardiovascular response to exercise.  The patient did not report non-pulmonary symptoms during exercise.  There was no significant exercise impairment during walking.  The patient did complete the study, walking 293 seconds of the 360 second test.  The patient did not require oxygen supplementation during walking.  Since the previous study in 01/18/19, exercise capacity is significantly improved ( 32%)  Based upon age and body mass index, exercise capacity is normal.    CXR: 02/19/20: Bilateral areas of scarring and/or atelectasis appear similar.  No focal consolidation.  No effusion.  Aortic atherosclerosis and tortuosity.  Cardiomediastinal silhouette is stable in size and configuration.  Degenerative changes of the spine and shoulders with stable postoperative changes of the chest.    Assessment / Plan:       Discussed diagnosis, its evaluation, treatment and usual course. All questions answered.    Problem List Items Addressed This Visit        Pulmonary    Pulmonary hypertension due to left heart valvular disease (Chronic)    Current Assessment & Plan       Plan of management per CARDIOLOGY (Dr. Daigle)  Preload and afterload reduction [ LASIX, NORVASC]  Dietary salt restriction.  Alcohol and tobacco  avoidance.         Moderate persistent asthma without complication - Primary    Current Assessment & Plan     Asthma ROS: Taking medications as instructed, no medication side effects noted  New concerns: None.   Exam: appears well, vitals normal, no respiratory distress, acyanotic, normal RR, chest clear, no wheezing, crepitations, rhonchi, normal symmetric air entry.   Assessment:  Asthma well controlled.   Plan:  BREO,SINGULAIR, VENTOLIN and PROVENTIL. Current treatment plan is effective. PFT, 6MWT in 4 months.    Immunization:    []        Pneumococcal Polysaccharide  Vaccine (23 Valent) (IM)  []        Pneumococcal Conjugate Vaccine (13 Valent) (IM)  [x]        Influenza - High Dose (65+) (PF) (IM)           Relevant Orders    Influenza - Quadrivalent (Adjuvanted) (Completed)    Complete PFT without bronchodilator - Clinic    Pulmonary stress test    CRLD (chronic restrictive lung disease)    Current Assessment & Plan     CRLD ROS: Restrictive lung disease secondary to body habitus / interstitial disease. taking medications as instructed, no medication side effects noted, no significant ongoing wheezing , using bronchodilator MDI less than twice a week.   New concerns: NONE.   Exam: appears well, vitals normal, no respiratory distress, acyanotic, normal RR.   Assessment:  CRLD stable.   Plan: Current treatment plan is effective, no change in therapy.Weight loss encouraged.            Cardiac/Vascular    Moderate aortic valve stenosis S/P AVR12/31/2014 (Chronic)    Overview     TRIFECTA TISSUE HEART VALVE  Serail # [id number]  Model # TF - 21A  Implant Date: 12/31/14  Dr. Burak Orlando         Current Assessment & Plan     Plan of management per CARDIOLOGY (Dr. VALENCIA)            Endocrine    Morbid obesity with BMI of 50.0-59.9, adult (Chronic)    Current Assessment & Plan     General weight loss/lifestyle modification strategies discussed (elicit support from others; identify saboteurs; non-food rewards,  etc).  Diet interventions: low calorie (1000 kCal/d) deficit diet.            Other    JACQUELINE on CPAP (Chronic)    Current Assessment & Plan     Continue CPAP of 13. (ALONZO SOLITARIO)     Discussed therapeutic goals for positive airway pressure therapy(CPAP or BiPAP): Ideal is usage 100% of nights for 6 - 8 hours per night. Minimum usage is 70% of night for at least 4 hours per night used Pateint expressed understanding. All Questions answered.    Complications of untreated sleep apnea discussed with pateint in detail including but not limited to  high blood pressure, heart attack, stroke, obesity,  diabetes and worsening heart failure. Patient voiced understanding.    CPAP supplies.    ONSAT on CPAP.         Relevant Orders    CPAP/BIPAP SUPPLIES            TIME SPENT WITH PATIENT: Time spent: 40 minutes in face to face  discussion concerning diagnosis, prognosis, review of lab and test results, benefits of treatment as well as management of disease, counseling of patient and coordination of care between various health  care providers . Greater than half the time spent was used for coordination of care and counseling of patient.       Follow up in about 4 months (around 2/26/2021) for JACQUELINE, Morbid Obesity, Restrictive Lung Disease.

## 2020-10-26 NOTE — ASSESSMENT & PLAN NOTE
Asthma ROS: Taking medications as instructed, no medication side effects noted  New concerns: None.   Exam: appears well, vitals normal, no respiratory distress, acyanotic, normal RR, chest clear, no wheezing, crepitations, rhonchi, normal symmetric air entry.   Assessment:  Asthma well controlled.   Plan:  BREO,SINGULAIR, VENTOLIN and PROVENTIL. Current treatment plan is effective. PFT, 6MWT in 4 months.    Immunization:    []        Pneumococcal Polysaccharide  Vaccine (23 Valent) (IM)  []        Pneumococcal Conjugate Vaccine (13 Valent) (IM)  [x]        Influenza - High Dose (65+) (PF) (IM)

## 2020-10-26 NOTE — PATIENT INSTRUCTIONS
Lung Anatomy  Your lungs take air in to give your body oxygen, which the body needs to work. Your lungs, like all the tissues in your body, are made up of billions of tiny specialized cells. Old lung cells die and are replaced by new, identical lung cells. This natural process helps ensure healthy lungs.    Date Last Reviewed: 11/1/2016  © 6086-3570 [employer]. [address] [place] [address], [place] [address]. All rights reserved. This information is not intended as a substitute for professional medical care. Always follow your healthcare professional's instructions.

## 2020-11-04 ENCOUNTER — TELEPHONE (OUTPATIENT)
Dept: PULMONOLOGY | Facility: CLINIC | Age: 75
End: 2020-11-04

## 2020-11-04 NOTE — TELEPHONE ENCOUNTER
----- Message from Roxana Torres sent at 11/4/2020 12:29 PM CST -----  Please call pt @ [phone], pt states she came in to get flu shot on 10/26, pt states  it hurt to move arm, very painful

## 2020-11-04 NOTE — TELEPHONE ENCOUNTER
Patient complains that her arm is sore and she is unable to move. Denies redness at the injection site. Received flu vaccine on 10/26/20. Instructed to go to urgent care for further eval.

## 2020-12-18 ENCOUNTER — TELEPHONE (OUTPATIENT)
Dept: ORTHOPEDICS | Facility: CLINIC | Age: 75
End: 2020-12-18

## 2020-12-18 NOTE — TELEPHONE ENCOUNTER
----- Message from Mai Hudson sent at 12/18/2020  9:14 AM CST -----  Regarding: running late  Contact: pt  Caller is requesting a call back regarding riding medical transportation an they just picked her up and she will be about 20 mins. Late.  Please call back at [phone].  Thanks.

## 2020-12-29 DIAGNOSIS — J45.40 MODERATE PERSISTENT ASTHMA WITHOUT COMPLICATION: Chronic | ICD-10-CM

## 2020-12-29 RX ORDER — MONTELUKAST SODIUM 10 MG/1
10 TABLET ORAL NIGHTLY
Qty: 90 TABLET | Refills: 3 | Status: SHIPPED | OUTPATIENT
Start: 2020-12-29

## 2021-01-11 ENCOUNTER — OFFICE VISIT (OUTPATIENT)
Dept: ORTHOPEDICS | Facility: CLINIC | Age: 76
End: 2021-01-11
Payer: MEDICARE

## 2021-01-11 VITALS
BODY MASS INDEX: 51.8 KG/M2 | WEIGHT: 263.88 LBS | HEART RATE: 72 BPM | DIASTOLIC BLOOD PRESSURE: 70 MMHG | HEIGHT: 60 IN | SYSTOLIC BLOOD PRESSURE: 151 MMHG

## 2021-01-11 DIAGNOSIS — M25.511 CHRONIC RIGHT SHOULDER PAIN: ICD-10-CM

## 2021-01-11 DIAGNOSIS — M19.011 ARTHRITIS OF RIGHT ACROMIOCLAVICULAR JOINT: ICD-10-CM

## 2021-01-11 DIAGNOSIS — G89.29 CHRONIC RIGHT SHOULDER PAIN: ICD-10-CM

## 2021-01-11 DIAGNOSIS — M12.811 ROTATOR CUFF ARTHROPATHY OF RIGHT SHOULDER: Primary | ICD-10-CM

## 2021-01-11 PROCEDURE — 1159F MED LIST DOCD IN RCRD: CPT | Mod: S$GLB,,, | Performed by: PHYSICIAN ASSISTANT

## 2021-01-11 PROCEDURE — 99999 PR PBB SHADOW E&M-EST. PATIENT-LVL V: CPT | Mod: PBBFAC,,, | Performed by: PHYSICIAN ASSISTANT

## 2021-01-11 PROCEDURE — 99213 PR OFFICE/OUTPT VISIT, EST, LEVL III, 20-29 MIN: ICD-10-PCS | Mod: 25,S$GLB,, | Performed by: PHYSICIAN ASSISTANT

## 2021-01-11 PROCEDURE — 1159F PR MEDICATION LIST DOCUMENTED IN MEDICAL RECORD: ICD-10-PCS | Mod: S$GLB,,, | Performed by: PHYSICIAN ASSISTANT

## 2021-01-11 PROCEDURE — 3078F PR MOST RECENT DIASTOLIC BLOOD PRESSURE < 80 MM HG: ICD-10-PCS | Mod: CPTII,S$GLB,, | Performed by: PHYSICIAN ASSISTANT

## 2021-01-11 PROCEDURE — 1125F AMNT PAIN NOTED PAIN PRSNT: CPT | Mod: S$GLB,,, | Performed by: PHYSICIAN ASSISTANT

## 2021-01-11 PROCEDURE — 99999 PR PBB SHADOW E&M-EST. PATIENT-LVL V: ICD-10-PCS | Mod: PBBFAC,,, | Performed by: PHYSICIAN ASSISTANT

## 2021-01-11 PROCEDURE — 1125F PR PAIN SEVERITY QUANTIFIED, PAIN PRESENT: ICD-10-PCS | Mod: S$GLB,,, | Performed by: PHYSICIAN ASSISTANT

## 2021-01-11 PROCEDURE — 3288F PR FALLS RISK ASSESSMENT DOCUMENTED: ICD-10-PCS | Mod: CPTII,S$GLB,, | Performed by: PHYSICIAN ASSISTANT

## 2021-01-11 PROCEDURE — 1101F PR PT FALLS ASSESS DOC 0-1 FALLS W/OUT INJ PAST YR: ICD-10-PCS | Mod: CPTII,S$GLB,, | Performed by: PHYSICIAN ASSISTANT

## 2021-01-11 PROCEDURE — 1101F PT FALLS ASSESS-DOCD LE1/YR: CPT | Mod: CPTII,S$GLB,, | Performed by: PHYSICIAN ASSISTANT

## 2021-01-11 PROCEDURE — 3078F DIAST BP <80 MM HG: CPT | Mod: CPTII,S$GLB,, | Performed by: PHYSICIAN ASSISTANT

## 2021-01-11 PROCEDURE — 3077F SYST BP >= 140 MM HG: CPT | Mod: CPTII,S$GLB,, | Performed by: PHYSICIAN ASSISTANT

## 2021-01-11 PROCEDURE — 3288F FALL RISK ASSESSMENT DOCD: CPT | Mod: CPTII,S$GLB,, | Performed by: PHYSICIAN ASSISTANT

## 2021-01-11 PROCEDURE — 3077F PR MOST RECENT SYSTOLIC BLOOD PRESSURE >= 140 MM HG: ICD-10-PCS | Mod: CPTII,S$GLB,, | Performed by: PHYSICIAN ASSISTANT

## 2021-01-11 PROCEDURE — 99213 OFFICE O/P EST LOW 20 MIN: CPT | Mod: 25,S$GLB,, | Performed by: PHYSICIAN ASSISTANT

## 2021-01-11 RX ORDER — CARVEDILOL 25 MG/1
25 TABLET ORAL DAILY
COMMUNITY
Start: 2020-12-29

## 2021-01-20 ENCOUNTER — IMMUNIZATION (OUTPATIENT)
Dept: INTERNAL MEDICINE | Facility: CLINIC | Age: 76
End: 2021-01-20
Payer: MEDICARE

## 2021-01-20 DIAGNOSIS — Z23 NEED FOR VACCINATION: Primary | ICD-10-CM

## 2021-01-20 PROCEDURE — 91300 COVID-19, MRNA, LNP-S, PF, 30 MCG/0.3 ML DOSE VACCINE: CPT | Mod: PBBFAC | Performed by: FAMILY MEDICINE

## 2021-02-10 ENCOUNTER — IMMUNIZATION (OUTPATIENT)
Dept: INTERNAL MEDICINE | Facility: CLINIC | Age: 76
End: 2021-02-10
Payer: MEDICARE

## 2021-02-10 DIAGNOSIS — Z23 NEED FOR VACCINATION: Primary | ICD-10-CM

## 2021-02-10 PROCEDURE — 91300 COVID-19, MRNA, LNP-S, PF, 30 MCG/0.3 ML DOSE VACCINE: CPT | Mod: PBBFAC | Performed by: FAMILY MEDICINE

## 2021-02-10 PROCEDURE — 0002A COVID-19, MRNA, LNP-S, PF, 30 MCG/0.3 ML DOSE VACCINE: CPT | Mod: PBBFAC | Performed by: FAMILY MEDICINE

## 2021-04-06 ENCOUNTER — OFFICE VISIT (OUTPATIENT)
Dept: PULMONOLOGY | Facility: CLINIC | Age: 76
End: 2021-04-06
Payer: MEDICARE

## 2021-04-06 ENCOUNTER — CLINICAL SUPPORT (OUTPATIENT)
Dept: PULMONOLOGY | Facility: CLINIC | Age: 76
End: 2021-04-06
Payer: MEDICARE

## 2021-04-06 VITALS
DIASTOLIC BLOOD PRESSURE: 84 MMHG | RESPIRATION RATE: 20 BRPM | SYSTOLIC BLOOD PRESSURE: 130 MMHG | HEART RATE: 84 BPM | BODY MASS INDEX: 52.33 KG/M2 | OXYGEN SATURATION: 94 % | WEIGHT: 266.56 LBS | HEIGHT: 60 IN

## 2021-04-06 DIAGNOSIS — R06.02 SOB (SHORTNESS OF BREATH) ON EXERTION: ICD-10-CM

## 2021-04-06 DIAGNOSIS — J45.40 MODERATE PERSISTENT ASTHMA WITHOUT COMPLICATION: ICD-10-CM

## 2021-04-06 DIAGNOSIS — G47.33 OSA ON CPAP: Primary | ICD-10-CM

## 2021-04-06 DIAGNOSIS — J98.4 CRLD (CHRONIC RESTRICTIVE LUNG DISEASE): ICD-10-CM

## 2021-04-06 DIAGNOSIS — I38 PULMONARY HYPERTENSION DUE TO LEFT HEART VALVULAR DISEASE: ICD-10-CM

## 2021-04-06 DIAGNOSIS — I27.22 PULMONARY HYPERTENSION DUE TO LEFT HEART VALVULAR DISEASE: ICD-10-CM

## 2021-04-06 DIAGNOSIS — G47.33 OSA ON CPAP: ICD-10-CM

## 2021-04-06 PROCEDURE — 1159F PR MEDICATION LIST DOCUMENTED IN MEDICAL RECORD: ICD-10-PCS | Mod: S$GLB,,, | Performed by: NURSE PRACTITIONER

## 2021-04-06 PROCEDURE — 99499 UNLISTED E&M SERVICE: CPT | Mod: S$GLB,,, | Performed by: NURSE PRACTITIONER

## 2021-04-06 PROCEDURE — 99999 PR PBB SHADOW E&M-EST. PATIENT-LVL V: ICD-10-PCS | Mod: PBBFAC,,, | Performed by: NURSE PRACTITIONER

## 2021-04-06 PROCEDURE — 94762 N-INVAS EAR/PLS OXIMTRY CONT: CPT | Mod: S$GLB,,, | Performed by: INTERNAL MEDICINE

## 2021-04-06 PROCEDURE — 3079F DIAST BP 80-89 MM HG: CPT | Mod: CPTII,S$GLB,, | Performed by: NURSE PRACTITIONER

## 2021-04-06 PROCEDURE — 94762 PR NONINVASV OXYGEN SATUR, CONT: ICD-10-PCS | Mod: S$GLB,,, | Performed by: INTERNAL MEDICINE

## 2021-04-06 PROCEDURE — 1159F MED LIST DOCD IN RCRD: CPT | Mod: S$GLB,,, | Performed by: NURSE PRACTITIONER

## 2021-04-06 PROCEDURE — 99214 PR OFFICE/OUTPT VISIT, EST, LEVL IV, 30-39 MIN: ICD-10-PCS | Mod: S$GLB,,, | Performed by: NURSE PRACTITIONER

## 2021-04-06 PROCEDURE — 3075F PR MOST RECENT SYSTOLIC BLOOD PRESS GE 130-139MM HG: ICD-10-PCS | Mod: CPTII,S$GLB,, | Performed by: NURSE PRACTITIONER

## 2021-04-06 PROCEDURE — 99214 OFFICE O/P EST MOD 30 MIN: CPT | Mod: S$GLB,,, | Performed by: NURSE PRACTITIONER

## 2021-04-06 PROCEDURE — 99999 PR PBB SHADOW E&M-EST. PATIENT-LVL V: CPT | Mod: PBBFAC,,, | Performed by: NURSE PRACTITIONER

## 2021-04-06 PROCEDURE — 3079F PR MOST RECENT DIASTOLIC BLOOD PRESSURE 80-89 MM HG: ICD-10-PCS | Mod: CPTII,S$GLB,, | Performed by: NURSE PRACTITIONER

## 2021-04-06 PROCEDURE — 99499 RISK ADDL DX/OHS AUDIT: ICD-10-PCS | Mod: S$GLB,,, | Performed by: NURSE PRACTITIONER

## 2021-04-06 PROCEDURE — 3075F SYST BP GE 130 - 139MM HG: CPT | Mod: CPTII,S$GLB,, | Performed by: NURSE PRACTITIONER

## 2021-04-09 ENCOUNTER — TELEPHONE (OUTPATIENT)
Dept: PULMONOLOGY | Facility: CLINIC | Age: 76
End: 2021-04-09

## 2021-04-13 ENCOUNTER — OFFICE VISIT (OUTPATIENT)
Dept: ORTHOPEDICS | Facility: CLINIC | Age: 76
End: 2021-04-13
Payer: MEDICARE

## 2021-04-13 ENCOUNTER — TELEPHONE (OUTPATIENT)
Dept: PULMONOLOGY | Facility: CLINIC | Age: 76
End: 2021-04-13

## 2021-04-13 ENCOUNTER — CLINICAL SUPPORT (OUTPATIENT)
Dept: PULMONOLOGY | Facility: CLINIC | Age: 76
End: 2021-04-13
Payer: MEDICARE

## 2021-04-13 VITALS — BODY MASS INDEX: 51.57 KG/M2 | HEIGHT: 60 IN | WEIGHT: 262.69 LBS

## 2021-04-13 VITALS
BODY MASS INDEX: 52.22 KG/M2 | HEART RATE: 72 BPM | WEIGHT: 266 LBS | HEIGHT: 60 IN | SYSTOLIC BLOOD PRESSURE: 153 MMHG | DIASTOLIC BLOOD PRESSURE: 75 MMHG

## 2021-04-13 DIAGNOSIS — M25.511 CHRONIC RIGHT SHOULDER PAIN: ICD-10-CM

## 2021-04-13 DIAGNOSIS — M12.811 ROTATOR CUFF ARTHROPATHY OF RIGHT SHOULDER: Primary | ICD-10-CM

## 2021-04-13 DIAGNOSIS — R06.02 SOB (SHORTNESS OF BREATH) ON EXERTION: ICD-10-CM

## 2021-04-13 DIAGNOSIS — M19.011 ARTHRITIS OF RIGHT ACROMIOCLAVICULAR JOINT: ICD-10-CM

## 2021-04-13 DIAGNOSIS — G89.29 CHRONIC RIGHT SHOULDER PAIN: ICD-10-CM

## 2021-04-13 PROCEDURE — 99999 PR PBB SHADOW E&M-EST. PATIENT-LVL V: ICD-10-PCS | Mod: PBBFAC,,, | Performed by: PHYSICIAN ASSISTANT

## 2021-04-13 PROCEDURE — 99999 PR PBB SHADOW E&M-EST. PATIENT-LVL I: ICD-10-PCS | Mod: PBBFAC,,,

## 2021-04-13 PROCEDURE — 20610 LARGE JOINT ASPIRATION/INJECTION: R SUBACROMIAL BURSA: ICD-10-PCS | Mod: RT,S$GLB,, | Performed by: PHYSICIAN ASSISTANT

## 2021-04-13 PROCEDURE — 99999 PR PBB SHADOW E&M-EST. PATIENT-LVL I: CPT | Mod: PBBFAC,,,

## 2021-04-13 PROCEDURE — 99214 OFFICE O/P EST MOD 30 MIN: CPT | Mod: 25,S$GLB,, | Performed by: PHYSICIAN ASSISTANT

## 2021-04-13 PROCEDURE — 94618 PULMONARY STRESS TESTING: ICD-10-PCS | Mod: S$GLB,,, | Performed by: INTERNAL MEDICINE

## 2021-04-13 PROCEDURE — 3288F PR FALLS RISK ASSESSMENT DOCUMENTED: ICD-10-PCS | Mod: CPTII,S$GLB,, | Performed by: PHYSICIAN ASSISTANT

## 2021-04-13 PROCEDURE — 1101F PT FALLS ASSESS-DOCD LE1/YR: CPT | Mod: CPTII,S$GLB,, | Performed by: PHYSICIAN ASSISTANT

## 2021-04-13 PROCEDURE — 1159F PR MEDICATION LIST DOCUMENTED IN MEDICAL RECORD: ICD-10-PCS | Mod: S$GLB,,, | Performed by: PHYSICIAN ASSISTANT

## 2021-04-13 PROCEDURE — 20610 DRAIN/INJ JOINT/BURSA W/O US: CPT | Mod: RT,S$GLB,, | Performed by: PHYSICIAN ASSISTANT

## 2021-04-13 PROCEDURE — 1101F PR PT FALLS ASSESS DOC 0-1 FALLS W/OUT INJ PAST YR: ICD-10-PCS | Mod: CPTII,S$GLB,, | Performed by: PHYSICIAN ASSISTANT

## 2021-04-13 PROCEDURE — 94618 PULMONARY STRESS TESTING: CPT | Mod: S$GLB,,, | Performed by: INTERNAL MEDICINE

## 2021-04-13 PROCEDURE — 99999 PR PBB SHADOW E&M-EST. PATIENT-LVL V: CPT | Mod: PBBFAC,,, | Performed by: PHYSICIAN ASSISTANT

## 2021-04-13 PROCEDURE — 3288F FALL RISK ASSESSMENT DOCD: CPT | Mod: CPTII,S$GLB,, | Performed by: PHYSICIAN ASSISTANT

## 2021-04-13 PROCEDURE — 1159F MED LIST DOCD IN RCRD: CPT | Mod: S$GLB,,, | Performed by: PHYSICIAN ASSISTANT

## 2021-04-13 PROCEDURE — 99214 PR OFFICE/OUTPT VISIT, EST, LEVL IV, 30-39 MIN: ICD-10-PCS | Mod: 25,S$GLB,, | Performed by: PHYSICIAN ASSISTANT

## 2021-04-13 PROCEDURE — 1125F AMNT PAIN NOTED PAIN PRSNT: CPT | Mod: S$GLB,,, | Performed by: PHYSICIAN ASSISTANT

## 2021-04-13 PROCEDURE — 1125F PR PAIN SEVERITY QUANTIFIED, PAIN PRESENT: ICD-10-PCS | Mod: S$GLB,,, | Performed by: PHYSICIAN ASSISTANT

## 2021-04-13 RX ORDER — METHYLPREDNISOLONE ACETATE 80 MG/ML
80 INJECTION, SUSPENSION INTRA-ARTICULAR; INTRALESIONAL; INTRAMUSCULAR; SOFT TISSUE
Status: DISCONTINUED | OUTPATIENT
Start: 2021-04-13 | End: 2021-04-13 | Stop reason: HOSPADM

## 2021-04-13 RX ADMIN — METHYLPREDNISOLONE ACETATE 80 MG: 80 INJECTION, SUSPENSION INTRA-ARTICULAR; INTRALESIONAL; INTRAMUSCULAR; SOFT TISSUE at 10:04

## 2021-04-20 ENCOUNTER — TELEPHONE (OUTPATIENT)
Dept: PULMONOLOGY | Facility: CLINIC | Age: 76
End: 2021-04-20

## 2021-05-05 ENCOUNTER — TELEPHONE (OUTPATIENT)
Dept: ORTHOPEDICS | Facility: CLINIC | Age: 76
End: 2021-05-05

## 2021-05-12 ENCOUNTER — TELEPHONE (OUTPATIENT)
Dept: ORTHOPEDICS | Facility: CLINIC | Age: 76
End: 2021-05-12

## 2021-05-12 DIAGNOSIS — G89.29 CHRONIC PAIN OF BOTH SHOULDERS: ICD-10-CM

## 2021-05-12 DIAGNOSIS — M12.811 ROTATOR CUFF ARTHROPATHY OF RIGHT SHOULDER: Primary | ICD-10-CM

## 2021-05-12 DIAGNOSIS — M25.511 CHRONIC PAIN OF BOTH SHOULDERS: ICD-10-CM

## 2021-05-12 DIAGNOSIS — M25.512 CHRONIC PAIN OF BOTH SHOULDERS: ICD-10-CM

## 2021-05-12 DIAGNOSIS — M75.42 IMPINGEMENT SYNDROME OF LEFT SHOULDER: ICD-10-CM

## 2021-05-13 NOTE — TELEPHONE ENCOUNTER
Ethel states that patient has continues to use both symbicort and breo. Instructed that per last office visit patient was instructed to stop symbicort and start Breo   Yes

## 2021-05-19 ENCOUNTER — TELEPHONE (OUTPATIENT)
Dept: ORTHOPEDICS | Facility: CLINIC | Age: 76
End: 2021-05-19

## 2021-06-02 DIAGNOSIS — J45.40 MODERATE PERSISTENT ASTHMA WITHOUT COMPLICATION: Chronic | ICD-10-CM

## 2021-06-02 DIAGNOSIS — J45.40 MODERATE PERSISTENT ASTHMA WITHOUT COMPLICATION: ICD-10-CM

## 2021-06-02 RX ORDER — ALBUTEROL SULFATE 90 UG/1
2 AEROSOL, METERED RESPIRATORY (INHALATION) EVERY 6 HOURS PRN
Qty: 18 G | Refills: 11 | Status: SHIPPED | OUTPATIENT
Start: 2021-06-02

## 2021-06-02 RX ORDER — FLUTICASONE FUROATE AND VILANTEROL TRIFENATATE 200; 25 UG/1; UG/1
1 POWDER RESPIRATORY (INHALATION) DAILY
Qty: 60 EACH | Refills: 11 | Status: SHIPPED | OUTPATIENT
Start: 2021-06-02

## 2021-06-22 ENCOUNTER — TELEPHONE (OUTPATIENT)
Dept: PULMONOLOGY | Facility: CLINIC | Age: 76
End: 2021-06-22

## 2021-07-13 ENCOUNTER — TELEPHONE (OUTPATIENT)
Dept: PULMONOLOGY | Facility: CLINIC | Age: 76
End: 2021-07-13

## 2021-07-13 NOTE — TELEPHONE ENCOUNTER
----- Message from Anselmo Bridges sent at 7/13/2021  8:41 AM CDT -----  Contact: pt  Patient called in to speak with the nurse in regards to the munoz recall letter. She wants to know hat are her next steps, what can she do for supplement until she is able to get a new machine. Please give her a call back at [phone].        Thanks,  Ll

## 2021-07-13 NOTE — TELEPHONE ENCOUNTER
Spoke with pt and pt was given steps on what to do being that the cpap machine are on recall. Pt states she will call [employer] and [employer] to start a claims on he cpap machine.

## 2021-07-19 DIAGNOSIS — M25.511 RIGHT SHOULDER PAIN, UNSPECIFIED CHRONICITY: Primary | ICD-10-CM

## 2021-07-20 ENCOUNTER — HOSPITAL ENCOUNTER (OUTPATIENT)
Dept: RADIOLOGY | Facility: HOSPITAL | Age: 76
Discharge: HOME | End: 2021-07-20
Attending: PHYSICIAN ASSISTANT
Payer: MEDICARE

## 2021-07-20 ENCOUNTER — OFFICE VISIT (OUTPATIENT)
Dept: ORTHOPEDICS | Facility: CLINIC | Age: 76
End: 2021-07-20
Payer: MEDICARE

## 2021-07-20 VITALS
WEIGHT: 262.81 LBS | SYSTOLIC BLOOD PRESSURE: 145 MMHG | HEART RATE: 73 BPM | BODY MASS INDEX: 51.6 KG/M2 | DIASTOLIC BLOOD PRESSURE: 77 MMHG | HEIGHT: 60 IN

## 2021-07-20 DIAGNOSIS — M25.511 RIGHT SHOULDER PAIN, UNSPECIFIED CHRONICITY: ICD-10-CM

## 2021-07-20 DIAGNOSIS — M25.511 CHRONIC RIGHT SHOULDER PAIN: Primary | ICD-10-CM

## 2021-07-20 DIAGNOSIS — G89.29 CHRONIC RIGHT SHOULDER PAIN: Primary | ICD-10-CM

## 2021-07-20 DIAGNOSIS — M12.811 ROTATOR CUFF ARTHROPATHY OF RIGHT SHOULDER: ICD-10-CM

## 2021-07-20 PROCEDURE — 1159F PR MEDICATION LIST DOCUMENTED IN MEDICAL RECORD: ICD-10-PCS | Mod: CPTII,S$GLB,, | Performed by: PHYSICIAN ASSISTANT

## 2021-07-20 PROCEDURE — 73030 X-RAY EXAM OF SHOULDER: CPT | Mod: TC,RT

## 2021-07-20 PROCEDURE — 99999 PR PBB SHADOW E&M-EST. PATIENT-LVL V: CPT | Mod: PBBFAC,,, | Performed by: PHYSICIAN ASSISTANT

## 2021-07-20 PROCEDURE — 1101F PT FALLS ASSESS-DOCD LE1/YR: CPT | Mod: CPTII,S$GLB,, | Performed by: PHYSICIAN ASSISTANT

## 2021-07-20 PROCEDURE — 3288F FALL RISK ASSESSMENT DOCD: CPT | Mod: CPTII,S$GLB,, | Performed by: PHYSICIAN ASSISTANT

## 2021-07-20 PROCEDURE — 1101F PR PT FALLS ASSESS DOC 0-1 FALLS W/OUT INJ PAST YR: ICD-10-PCS | Mod: CPTII,S$GLB,, | Performed by: PHYSICIAN ASSISTANT

## 2021-07-20 PROCEDURE — 20610 LARGE JOINT ASPIRATION/INJECTION: R SUBACROMIAL BURSA: ICD-10-PCS | Mod: RT,S$GLB,, | Performed by: PHYSICIAN ASSISTANT

## 2021-07-20 PROCEDURE — 1159F MED LIST DOCD IN RCRD: CPT | Mod: CPTII,S$GLB,, | Performed by: PHYSICIAN ASSISTANT

## 2021-07-20 PROCEDURE — 20610 DRAIN/INJ JOINT/BURSA W/O US: CPT | Mod: RT,S$GLB,, | Performed by: PHYSICIAN ASSISTANT

## 2021-07-20 PROCEDURE — 99214 PR OFFICE/OUTPT VISIT, EST, LEVL IV, 30-39 MIN: ICD-10-PCS | Mod: 25,S$GLB,, | Performed by: PHYSICIAN ASSISTANT

## 2021-07-20 PROCEDURE — 3288F PR FALLS RISK ASSESSMENT DOCUMENTED: ICD-10-PCS | Mod: CPTII,S$GLB,, | Performed by: PHYSICIAN ASSISTANT

## 2021-07-20 PROCEDURE — 99214 OFFICE O/P EST MOD 30 MIN: CPT | Mod: 25,S$GLB,, | Performed by: PHYSICIAN ASSISTANT

## 2021-07-20 PROCEDURE — 1125F AMNT PAIN NOTED PAIN PRSNT: CPT | Mod: CPTII,S$GLB,, | Performed by: PHYSICIAN ASSISTANT

## 2021-07-20 PROCEDURE — 73030 XR SHOULDER COMPLETE 2 OR MORE VIEWS RIGHT: ICD-10-PCS | Mod: 26,RT,, | Performed by: RADIOLOGY

## 2021-07-20 PROCEDURE — 99999 PR PBB SHADOW E&M-EST. PATIENT-LVL V: ICD-10-PCS | Mod: PBBFAC,,, | Performed by: PHYSICIAN ASSISTANT

## 2021-07-20 PROCEDURE — 73030 X-RAY EXAM OF SHOULDER: CPT | Mod: 26,RT,, | Performed by: RADIOLOGY

## 2021-07-20 PROCEDURE — 1125F PR PAIN SEVERITY QUANTIFIED, PAIN PRESENT: ICD-10-PCS | Mod: CPTII,S$GLB,, | Performed by: PHYSICIAN ASSISTANT

## 2021-07-20 RX ORDER — METHYLPREDNISOLONE ACETATE 80 MG/ML
80 INJECTION, SUSPENSION INTRA-ARTICULAR; INTRALESIONAL; INTRAMUSCULAR; SOFT TISSUE
Status: DISCONTINUED | OUTPATIENT
Start: 2021-07-20 | End: 2021-07-20 | Stop reason: HOSPADM

## 2021-07-20 RX ORDER — DEXTROMETHORPHAN HYDROBROMIDE, GUAIFENESIN 5; 100 MG/5ML; MG/5ML
LIQUID ORAL DAILY PRN
COMMUNITY

## 2021-07-20 RX ADMIN — METHYLPREDNISOLONE ACETATE 80 MG: 80 INJECTION, SUSPENSION INTRA-ARTICULAR; INTRALESIONAL; INTRAMUSCULAR; SOFT TISSUE at 10:07
